# Patient Record
Sex: FEMALE | Race: WHITE | NOT HISPANIC OR LATINO | Employment: FULL TIME | URBAN - METROPOLITAN AREA
[De-identification: names, ages, dates, MRNs, and addresses within clinical notes are randomized per-mention and may not be internally consistent; named-entity substitution may affect disease eponyms.]

---

## 2017-02-15 ENCOUNTER — GENERIC CONVERSION - ENCOUNTER (OUTPATIENT)
Dept: OTHER | Facility: OTHER | Age: 72
End: 2017-02-15

## 2017-02-16 DIAGNOSIS — E03.9 HYPOTHYROIDISM: ICD-10-CM

## 2017-02-16 DIAGNOSIS — M10.9 GOUT: ICD-10-CM

## 2017-02-16 DIAGNOSIS — E78.5 HYPERLIPIDEMIA: ICD-10-CM

## 2017-02-16 DIAGNOSIS — I10 ESSENTIAL (PRIMARY) HYPERTENSION: ICD-10-CM

## 2017-02-16 DIAGNOSIS — E11.9 TYPE 2 DIABETES MELLITUS WITHOUT COMPLICATIONS (HCC): ICD-10-CM

## 2017-02-20 ENCOUNTER — GENERIC CONVERSION - ENCOUNTER (OUTPATIENT)
Dept: OTHER | Facility: OTHER | Age: 72
End: 2017-02-20

## 2017-03-24 ENCOUNTER — GENERIC CONVERSION - ENCOUNTER (OUTPATIENT)
Dept: OTHER | Facility: OTHER | Age: 72
End: 2017-03-24

## 2017-05-01 ENCOUNTER — ALLSCRIPTS OFFICE VISIT (OUTPATIENT)
Dept: OTHER | Facility: OTHER | Age: 72
End: 2017-05-01

## 2017-05-30 ENCOUNTER — ALLSCRIPTS OFFICE VISIT (OUTPATIENT)
Dept: OTHER | Facility: OTHER | Age: 72
End: 2017-05-30

## 2017-05-30 DIAGNOSIS — M70.51 OTHER BURSITIS OF KNEE, RIGHT KNEE: ICD-10-CM

## 2017-06-12 ENCOUNTER — ALLSCRIPTS OFFICE VISIT (OUTPATIENT)
Dept: OTHER | Facility: OTHER | Age: 72
End: 2017-06-12

## 2017-06-19 ENCOUNTER — ALLSCRIPTS OFFICE VISIT (OUTPATIENT)
Dept: OTHER | Facility: OTHER | Age: 72
End: 2017-06-19

## 2017-06-20 ENCOUNTER — GENERIC CONVERSION - ENCOUNTER (OUTPATIENT)
Dept: OTHER | Facility: OTHER | Age: 72
End: 2017-06-20

## 2017-06-22 ENCOUNTER — GENERIC CONVERSION - ENCOUNTER (OUTPATIENT)
Dept: OTHER | Facility: OTHER | Age: 72
End: 2017-06-22

## 2017-06-27 ENCOUNTER — GENERIC CONVERSION - ENCOUNTER (OUTPATIENT)
Dept: OTHER | Facility: OTHER | Age: 72
End: 2017-06-27

## 2017-07-03 ENCOUNTER — ALLSCRIPTS OFFICE VISIT (OUTPATIENT)
Dept: OTHER | Facility: OTHER | Age: 72
End: 2017-07-03

## 2017-07-10 ENCOUNTER — ALLSCRIPTS OFFICE VISIT (OUTPATIENT)
Dept: OTHER | Facility: OTHER | Age: 72
End: 2017-07-10

## 2017-07-24 ENCOUNTER — ALLSCRIPTS OFFICE VISIT (OUTPATIENT)
Dept: OTHER | Facility: OTHER | Age: 72
End: 2017-07-24

## 2017-08-07 ENCOUNTER — ALLSCRIPTS OFFICE VISIT (OUTPATIENT)
Dept: OTHER | Facility: OTHER | Age: 72
End: 2017-08-07

## 2017-08-21 ENCOUNTER — ALLSCRIPTS OFFICE VISIT (OUTPATIENT)
Dept: OTHER | Facility: OTHER | Age: 72
End: 2017-08-21

## 2017-09-05 ENCOUNTER — ALLSCRIPTS OFFICE VISIT (OUTPATIENT)
Dept: OTHER | Facility: OTHER | Age: 72
End: 2017-09-05

## 2017-09-20 ENCOUNTER — ALLSCRIPTS OFFICE VISIT (OUTPATIENT)
Dept: OTHER | Facility: OTHER | Age: 72
End: 2017-09-20

## 2017-10-01 DIAGNOSIS — E03.9 HYPOTHYROIDISM: ICD-10-CM

## 2017-10-01 DIAGNOSIS — E79.0 HYPERURICEMIA WITHOUT SIGNS OF INFLAMMATORY ARTHRITIS AND TOPHACEOUS DISEASE: ICD-10-CM

## 2017-10-01 DIAGNOSIS — E11.9 TYPE 2 DIABETES MELLITUS WITHOUT COMPLICATIONS (HCC): ICD-10-CM

## 2017-10-01 DIAGNOSIS — E78.5 HYPERLIPIDEMIA: ICD-10-CM

## 2017-10-02 ENCOUNTER — ALLSCRIPTS OFFICE VISIT (OUTPATIENT)
Dept: OTHER | Facility: OTHER | Age: 72
End: 2017-10-02

## 2017-10-22 ENCOUNTER — GENERIC CONVERSION - ENCOUNTER (OUTPATIENT)
Dept: OTHER | Facility: OTHER | Age: 72
End: 2017-10-22

## 2017-12-12 DIAGNOSIS — E03.9 HYPOTHYROIDISM: ICD-10-CM

## 2017-12-12 DIAGNOSIS — C50.919 MALIGNANT NEOPLASM OF FEMALE BREAST (HCC): ICD-10-CM

## 2017-12-12 DIAGNOSIS — E78.5 HYPERLIPIDEMIA: ICD-10-CM

## 2017-12-12 DIAGNOSIS — E11.9 TYPE 2 DIABETES MELLITUS WITHOUT COMPLICATIONS (HCC): ICD-10-CM

## 2017-12-12 DIAGNOSIS — E79.0 HYPERURICEMIA WITHOUT SIGNS OF INFLAMMATORY ARTHRITIS AND TOPHACEOUS DISEASE: ICD-10-CM

## 2017-12-15 ENCOUNTER — ALLSCRIPTS OFFICE VISIT (OUTPATIENT)
Dept: OTHER | Facility: OTHER | Age: 72
End: 2017-12-15

## 2017-12-16 NOTE — PROCEDURES
Chief Complaint  Prepatellar bursitis of right knee      Current Meds   1  Acetaminophen-Codeine #3 300-30 MG Oral Tablet; 1-2 q 4 h prn pain; Therapy: 84RGS6933 to (Last BA:37VWU8320) Ordered   2  Albuterol Sulfate 108 (90 Base) MCG/ACT AERS; USE AS DIRECTED Recorded   3  [place] Thyroid 90 MG Oral Tablet; TAKE ONE TABLET BY MOUTH ONCE DAILY; Therapy: 57Pez6762 to (Last Rx:97Akn2732)  Requested for: 37NUB9258 Ordered   4  DiazePAM 5 MG Oral Tablet; One 3 times a day when necessary anxiety; Therapy: 67NEA9847 to (Last VZ:86XUT1183) Ordered   5  EpiPen 2-Ever 0 3 MG/0 3ML Injection Solution Auto-injector; use as directed; Therapy: 26Zrf3688 to (Evaluate:37Uws2407)  Requested for: 82Aew7370; Last Rx:46Ikr7421 Ordered   6  Fluticasone Propionate 50 MCG/ACT Nasal Suspension; Therapy: 08LBJ8331 to (Evaluate:90Egu1132) Recorded   7  Losartan Potassium-HCTZ 50-12 5 MG Oral Tablet; TAKE 1 TABLET DAILY; Therapy: 19Uao3646 to (Evaluate:17Nzk9619)  Requested for: 64EOR7584; Last Rx:42Via2426 Ordered   8  Rosuvastatin Calcium 10 MG Oral Tablet (Crestor); take one tablet by mouth every day; Therapy: 30FOR2801 to (Evaluate:66Yhm5003)  Requested for: 79EQN0060; Last Rx:36Smp3336; Status: ACTIVE - Transmit to [facility] Ordered   9  Symbicort 160-4 5 MCG/ACT Inhalation Aerosol; Therapy: 00YPC3571 to (Evaluate:69Riz2309) Recorded    Allergies  1  amoxicillin   2  Cefzil   3  erythromycin   4  Keflex   5  LevoFLOXacin TABS   6  Sulfa Antibiotics    Vitals  Signs   Temperature: 96 3 F  Heart Rate: 84  Respiration: 16  Systolic: 232  Diastolic: 78  Height: 5 ft 4 in  Weight: 147 lb   BMI Calculated: 25 23  BSA Calculated: 1 72  O2 Saturation: 93    Procedure    Procedure: Aspiration of the right prepatellar bursa  Indication:  Effusion  Were discussed with the patient  Verbal consent was obtained prior to the procedure  Alcohol was used to prep the area   The area was then injected with 2 mL 2% lidocaine with epinephrine  Using sterile technique, the aspiration/injection needle was then directed from a inferior aspect  20 mL of yellow fluid was aspirated with an 18-gauge  A bandage was applied  the patient tolerated the procedure well  Complications: none  Patient instructed to avoid strenuous activity for 3 day(s)  Follow-up in the office as needed  Assessment  1  Prepatellar bursitis of right knee (458 58) (M70 41)    Plan  Arthritis    · Ibuprofen 400 MG Oral Tablet; TAKE 1 TABLET 3 TIMES DAILY AS NEEDED    Future Appointments    Date/Time Provider Specialty Site   12/19/2017 10:00 AM EMERSON Metcalf   [address] [place] [address]     Signatures   Electronically signed by : EMERSON Robison ; Dec 15 2017 12:30PM EST                       (Author)

## 2017-12-16 NOTE — PROGRESS NOTES
Assessment  1  Prepatellar bursitis of right knee (726 65) (M70 41)    Plan  Arthritis    · Ibuprofen 400 MG Oral Tablet; TAKE 1 TABLET 3 TIMES DAILY AS NEEDED    Discussion/Summary    Knee is drained of 20 cc of straw-colored fluid  Chief Complaint  Patient is in the office today complaining of having fallen on her right knee a week ago and it is swollen and she stated that she has fluid retention in her knee  History of Present Illness  HPI: Patient comes in with stool on right knee for falling on her knee 1 week ago  Review of Systems   Constitutional: No fever, no chills, feels well, no tiredness, no recent weight gain or loss  Cardiovascular: no complaints of slow or fast heart rate, no chest pain, no palpitations, no leg claudication or lower extremity edema  Respiratory: no complaints of shortness of breath, no wheezing, no dyspnea on exertion, no orthopnea or PND  Active Problems  1  Acute bronchitis due to Mycoplasma pneumoniae (466 0,041 81) (J20 0)   2  Acute bronchitis, unspecified organism (466 0) (J20 9)   3  Acute URI (465 9) (J06 9)   4  Allergic rhinitis (477 9) (J30 9)   5  Allergic rhinitis, seasonal (477 9) (J30 2)   6  Anemia (285 9) (D64 9)   7  Anxiety (300 00) (F41 9)   8  Arthritis (716 90) (M19 90)   9  Asthma (493 90) (J45 909)   10  Breast cancer (174 9) (C50 919)   11  Bursitis of right knee (726 60) (M70 51)   12  Cervicalgia (723 1) (M54 2)   13  Dermatitis (692 9) (L30 9)   14  Diabetes (250 00) (E11 9)   15  Encounter for screening colonoscopy (V76 51) (Z12 11)   16  Encounter for special screening examination for genitourinary disorder (V81 6) (Z13 89)   17  Fatty liver (571 8) (K76 0)   18  Gout (274 9) (M10 9)   19  H/O blood clots (V12 51) (Z86 718)   20  Hyperlipidemia (272 4) (E78 5)   21  Hypertension (401 9) (I10)   22  Hyperuricemia (790 6) (E79 0)   23  Hypothyroidism (244 9) (E03 9)   24  Leg edema, right (782 3) (R60 0)   25   Liver enlargement (789 1) (R16 0)   26  Nausea and/or vomiting (787 01) (R11 2)   27  Need for influenza vaccination (V04 81) (Z23)   28  Need for pneumococcal vaccination (V03 82) (Z23)   29  Need for Tdap vaccination (V06 1) (Z23)   30  Need for vaccination with 13-polyvalent pneumococcal conjugate vaccine (V03 82) (Z23)   31  Need for Zostavax administration (V04 89) (Z23)   32  Seborrhea (706 3) (L21 9)   33  Sinusitis, acute (461 9) (J01 90)   34  Thyroid disorder (246 9) (E07 9)   35  Type 2 diabetes mellitus (250 00) (E11 9)   36  UTI (lower urinary tract infection) (599 0) (N39 0)   37  Vision disturbance (368 9) (H53 9)   38  Visit for pre-operative examination (V72 84) (Q91 438)    Past Medical History  1  History of acute sinusitis (V12 69) (Z87 09)   2  History of EKG (V15 89) (Z92 89)  Active Problems And Past Medical History Reviewed: The active problems and past medical history were reviewed and updated today  Family History  Mother    1  Family history of Cancer (199 1) (C80 1)   2  Family history of Diabetes (250 00) (E11 9)   3  Denied: Family history of mental disorder   4  Denied: Family history of substance abuse   5  Family history of Heart disease (429 9) (I51 9)  Father    6  Family history of Cancer (199 1) (C80 1)   7  Denied: Family history of mental disorder   8  Denied: Family history of substance abuse  Family History    9  Denied: Family history of mental disorder   10  Denied: Family history of substance abuse    Social History   · Activities: Golf   · Consumes alcohol occasionally (V49 89) (Z78 9)   · Former smoker (O37 24) (G08 148)   · Full-time employment   · Never uses seat belt   · No drug use    Surgical History  1  History of Breast Surgery Mastectomy   2  History of Hand Surgery   3  History of Incisional Breast Biopsy   4  History of Laminectomy Lumbar    Current Meds   1  Acetaminophen-Codeine #3 300-30 MG Oral Tablet; 1-2 q 4 h prn pain;  Therapy: 85QHY6695 to (Last KX:49GIJ4019) Ordered   2  Albuterol Sulfate 108 (90 Base) MCG/ACT AERS; USE AS DIRECTED Recorded   3  [place] Thyroid 90 MG Oral Tablet; TAKE ONE TABLET BY MOUTH ONCE DAILY; Therapy: 53Ulo2706 to (Last Rx:23Uzf1669)  Requested for: 49HPM2000 Ordered   4  DiazePAM 5 MG Oral Tablet; One 3 times a day when necessary anxiety; Therapy: 27CGG4980 to (Last TV:69KBW9613) Ordered   5  EpiPen 2-Ever 0 3 MG/0 3ML Injection Solution Auto-injector; use as directed; Therapy: 01Nhp1899 to (Evaluate:13Xnt6330)  Requested for: 12Qwx3311; Last Rx:65Isy6373 Ordered   6  Fluticasone Propionate 50 MCG/ACT Nasal Suspension; Therapy: 17OII1088 to (Evaluate:09Nhh0771) Recorded   7  Losartan Potassium-HCTZ 50-12 5 MG Oral Tablet; TAKE 1 TABLET DAILY; Therapy: 42Wpg2350 to (Evaluate:07Cfl7232)  Requested for: 33SZG4698; Last Rx:15Aul0574 Ordered   8  Rosuvastatin Calcium 10 MG Oral Tablet; take one tablet by mouth every day; Therapy: 05CPT2121 to (Evaluate:65Uop1979)  Requested for: 80JBQ7695; Last Rx:18Mix6050; Status: ACTIVE - Transmit to [facility] Verification Ordered   9  Symbicort 160-4 5 MCG/ACT Inhalation Aerosol; Therapy: 94PXU2990 to (Evaluate:84Tfe1086) Recorded    The medication list was reviewed and updated today  Allergies  1  amoxicillin   2  Cefzil   3  erythromycin   4  Keflex   5  LevoFLOXacin TABS   6  Sulfa Antibiotics    Vitals   Recorded: 56PFH7116 10:37AM   Temperature 96 3 F   Heart Rate 84   Respiration 16   Systolic 047   Diastolic 78   Height 5 ft 4 in   Weight 147 lb    BMI Calculated 25 23   BSA Calculated 1 72   O2 Saturation 93     Physical Exam   Musculoskeletal  Inspection/palpation of joints, bones, and muscles: Abnormal  -- effusion of prepatellar area          Results/Data  Falls Risk Assessment (Dx Z13 89 Screen for Neurologic Disorder) 40CIF5431 10:43AM User, Ahs     Test Name Result Flag Reference   Falls Risk      1 fall without injury in the past year       Future Appointments    Date/Time Provider Specialty Site   12/19/2017 10:00 AM EMERSON Chapman   [address]     Signatures   Electronically signed by : EMERSON Holden ; Dec 15 2017 12:27PM EST                       (Author)

## 2017-12-19 ENCOUNTER — ALLSCRIPTS OFFICE VISIT (OUTPATIENT)
Dept: OTHER | Facility: OTHER | Age: 72
End: 2017-12-19

## 2018-01-09 NOTE — PROGRESS NOTES
Chief Complaint  Patient is here for her allergy shot      Active Problems    1  Acute bronchitis due to Mycoplasma pneumoniae (466 0,041 81) (J20 0)   2  Acute bronchitis, unspecified organism (466 0) (J20 9)   3  Acute URI (465 9) (J06 9)   4  Allergic rhinitis (477 9) (J30 9)   5  Allergic rhinitis, seasonal (477 9) (J30 2)   6  Anemia (285 9) (D64 9)   7  Anxiety (300 00) (F41 9)   8  Arthritis (716 90) (M19 90)   9  Asthma (493 90) (J45 909)   10  Breast cancer (174 9) (C50 919)   11  Bursitis of right knee (726 60) (M70 51)   12  Cervicalgia (723 1) (M54 2)   13  Dermatitis (692 9) (L30 9)   14  Diabetes (250 00) (E11 9)   15  Encounter for screening colonoscopy (V76 51) (Z12 11)   16  Encounter for special screening examination for genitourinary disorder (V81 6) (Z13 89)   17  Fatty liver (571 8) (K76 0)   18  Gout (274 9) (M10 9)   19  H/O blood clots (V12 51) (Z86 718)   20  Hyperlipidemia (272 4) (E78 5)   21  Hypertension (401 9) (I10)   22  Hyperuricemia (790 6) (E79 0)   23  Hypothyroidism (244 9) (E03 9)   24  Leg edema, right (782 3) (R60 0)   25  Liver enlargement (789 1) (R16 0)   26  Nausea and/or vomiting (787 01) (R11 2)   27  Need for influenza vaccination (V04 81) (Z23)   28  Need for pneumococcal vaccination (V03 82) (Z23)   29  Need for Tdap vaccination (V06 1) (Z23)   30  Need for vaccination with 13-polyvalent pneumococcal conjugate vaccine (V03 82) (Z23)   31  Need for Zostavax administration (V04 89) (Z23)   32  Seborrhea (706 3) (L21 9)   33  Sinusitis, acute (461 9) (J01 90)   34  Thyroid disorder (246 9) (E07 9)   35  Type 2 diabetes mellitus (250 00) (E11 9)   36  UTI (lower urinary tract infection) (599 0) (N39 0)   37  Vision disturbance (368 9) (H53 9)   38  Visit for pre-operative examination (V72 84) (Z01 818)    Current Meds   1  Acetaminophen-Codeine #3 300-30 MG Oral Tablet; 1-2 q 4 h prn pain; Therapy: 04ALJ4320 to (Last YK:59YZX7182) Ordered   2   Albuterol Sulfate 108 (90 Base) MCG/ACT AERS; USE AS DIRECTED Recorded   3  Allopurinol 100 MG Oral Tablet; TAKE TWO TABLETS BY MOUTH ONCE DAILY; Therapy: 39Esw0998 to (Last Rx:12Apr2017)  Requested for: 73Aeh7575 Ordered   4  [place] Thyroid 90 MG Oral Tablet; TAKE ONE TABLET BY MOUTH ONCE DAILY; Therapy: 20Jun2016 to (Last Rx:88Ayj7709)  Requested for: 00SRP6530; Status:   ACTIVE - Transmit to Pharmacy - Awaiting Verification Ordered   5  Colcrys 0 6 MG Oral Tablet; 1 daily and 1 q 3 h prn gout; Therapy: 89LCK1003 to (Last QP:73IIV4820)  Requested for: 98BWI2785; Status:   ACTIVE - Transmit to Pharmacy - Awaiting Verification Ordered   6  DiazePAM 5 MG Oral Tablet; One 3 times a day when necessary anxiety; Therapy: 18QJN7391 to (Last AT:04XPV7226) Ordered   7  EpiPen 2-Ever 0 3 MG/0 3ML Injection Solution Auto-injector; use as directed; Therapy: 16Mfp2013 to (Evaluate:09Qiu6412)  Requested for: 47Shu1495; Last   Rx:74Mys2906 Ordered   8  Fluticasone Propionate 50 MCG/ACT Nasal Suspension; Therapy: 44PZK5097 to (Evaluate:47Tlv1996) Recorded   9  Indomethacin 25 MG Oral Capsule; TAKE 1 CAPSULE 3 times daily WITH FOOD; Therapy: 17ABJ0344 to (Complete:19Jun2017)  Requested for: 69RSJ6545; Last   Rx:05Xft4733 Ordered   10  Losartan Potassium-HCTZ 50-12 5 MG Oral Tablet; TAKE 1 TABLET DAILY; Therapy: 72Aqk9306 to (Evaluate:21Jly6194)  Requested for: 56CRY7574; Last    Rx:19May2017 Ordered   11  Rosuvastatin Calcium 10 MG Oral Tablet; take one tablet by mouth every day; Therapy: 78LWF1678 to (Evaluate:34Rcg9792)  Requested for: 47CKF8252; Last    Rx:49Ljq1819; Status: ACTIVE - Transmit to [facility] Verification    Ordered   12  Symbicort 160-4 5 MCG/ACT Inhalation Aerosol; Therapy: 92CJW4606 to (Evaluate:88Oij4713) Recorded    Allergies    1  LevoFLOXacin TABS   2  amoxicillin   3  Cefzil   4  erythromycin   5  Keflex   6   Sulfa Antibiotics    Results/Data  Allergy Injection, multiple injections 82VYN0424 09: 28AM [facility]     Test Name Result Flag Reference   Allergy Injection #1 0 50 cc vial 1     Allergy Injection #1 Site      left upper arm superior   Allergy Inj #1 Reaction none     Allergy Injection #2 0 50cc vial 2     Allergy Injection #2 Site      left upper arm inferior   Allergy Inj #2 Reaction non     Allergy Injection #1 0 50 cc vial 1     Allergy Injection #1 Site      left upper arm superior   Allergy Inj #1 Reaction none     Allergy Injection #2 0 50cc vial 2     Allergy Injection #2 Site      left upper arm inferior   Allergy Inj #2 Reaction non               Plan  Allergic rhinitis    · Allergy Injection, multiple injections; Status:Resulted - Requires  Verification,Retrospective By Protocol Authorization;   Done: 56ZKQ7667 09:28AM    Future Appointments    Date/Time Provider Specialty Site   06/19/2017 09:45 AM [facility], Nurse Schedule  [address]   06/27/2017 09:15 AM [facility], Nurse Schedule  [address]   07/03/2017 09:15 AM [facility], Nurse Schedule  [address]   07/10/2017 08:45 AM [facility], Nurse Schedule  [address]   07/17/2017 09:15 AM [facility], Nurse Schedule  [address]   07/24/2017 09:15 AM [facility], Nurse Schedule  [address]   07/31/2017 09:15 AM [facility], Nurse Schedule  [address]     Signatures   Electronically signed by : Vahid Orellana, ; Jun 12 2017  9:29AM EST                       (Author)

## 2018-01-10 NOTE — PROGRESS NOTES
Chief Complaint  Patient here for allergy injections  Active Problems    1  Acute bronchitis due to Mycoplasma pneumoniae (466 0,041 81) (J20 0)   2  Acute bronchitis, unspecified organism (466 0) (J20 9)   3  Acute URI (465 9) (J06 9)   4  Allergic rhinitis (477 9) (J30 9)   5  Allergic rhinitis, seasonal (477 9) (J30 2)   6  Anemia (285 9) (D64 9)   7  Anxiety (300 00) (F41 9)   8  Arthritis (716 90) (M19 90)   9  Asthma (493 90) (J45 909)   10  Breast cancer (174 9) (C50 919)   11  Bursitis of right knee (726 60) (M70 51)   12  Cervicalgia (723 1) (M54 2)   13  Dermatitis (692 9) (L30 9)   14  Diabetes (250 00) (E11 9)   15  Encounter for screening colonoscopy (V76 51) (Z12 11)   16  Encounter for special screening examination for genitourinary disorder (V81 6) (Z13 89)   17  Fatty liver (571 8) (K76 0)   18  Gout (274 9) (M10 9)   19  H/O blood clots (V12 51) (Z86 718)   20  Hyperlipidemia (272 4) (E78 5)   21  Hypertension (401 9) (I10)   22  Hyperuricemia (790 6) (E79 0)   23  Hypothyroidism (244 9) (E03 9)   24  Leg edema, right (782 3) (R60 0)   25  Liver enlargement (789 1) (R16 0)   26  Nausea and/or vomiting (787 01) (R11 2)   27  Need for influenza vaccination (V04 81) (Z23)   28  Need for pneumococcal vaccination (V03 82) (Z23)   29  Need for Tdap vaccination (V06 1) (Z23)   30  Need for vaccination with 13-polyvalent pneumococcal conjugate vaccine (V03 82) (Z23)   31  Need for Zostavax administration (V04 89) (Z23)   32  Seborrhea (706 3) (L21 9)   33  Sinusitis, acute (461 9) (J01 90)   34  Thyroid disorder (246 9) (E07 9)   35  Type 2 diabetes mellitus (250 00) (E11 9)   36  UTI (lower urinary tract infection) (599 0) (N39 0)   37  Vision disturbance (368 9) (H53 9)   38  Visit for pre-operative examination (B22 84) (Z01 818)    Current Meds   1  Acetaminophen-Codeine #3 300-30 MG Oral Tablet; 1-2 q 4 h prn pain; Therapy: 91RJO0368 to (Last DP:02WHV9020) Ordered   2   Albuterol Sulfate 108 (90 Base) MCG/ACT AERS; USE AS DIRECTED Recorded   3  Allopurinol 100 MG Oral Tablet; TAKE TWO TABLETS BY MOUTH ONCE DAILY; Therapy: 80Cxa7651 to (Last Rx:17Hhv9248)  Requested for: 58Fbh3418 Ordered   4  [place] Thyroid 90 MG Oral Tablet; TAKE ONE TABLET BY MOUTH ONCE DAILY; Therapy: 81Xve2301 to (Last Rx:54Uki5956)  Requested for: 78AJC1766; Status:   ACTIVE - Transmit to Pharmacy - Awaiting Verification Ordered   5  Colcrys 0 6 MG Oral Tablet; 1 daily and 1 q 3 h prn gout; Therapy: 80GXK5903 to (Last JU:99DUS5502)  Requested for: 19CBO0419; Status:   ACTIVE - Transmit to Pharmacy - Awaiting Verification Ordered   6  DiazePAM 5 MG Oral Tablet; One 3 times a day when necessary anxiety; Therapy: 50TEX5497 to (Last SL:51ORM1153) Ordered   7  EpiPen 2-Ever 0 3 MG/0 3ML Injection Solution Auto-injector; use as directed; Therapy: 78Yni8365 to (Evaluate:06Hcc8290)  Requested for: 15Toc8824; Last   Rx:02Laa8476 Ordered   8  Fluticasone Propionate 50 MCG/ACT Nasal Suspension; Therapy: 09YGM2624 to (Evaluate:10Fnl4604) Recorded   9  Losartan Potassium-HCTZ 50-12 5 MG Oral Tablet; TAKE 1 TABLET DAILY; Therapy: 37Ijq8938 to (Evaluate:19Xal2487)  Requested for: 55TVK0762; Last   Rx:17Zdg1843 Ordered   10  Rosuvastatin Calcium 10 MG Oral Tablet; take one tablet by mouth every day; Therapy: 91ZRF2055 to (Evaluate:15Wqf3281)  Requested for: 41QNO5645; Last    Rx:12Rnw1328; Status: ACTIVE - Transmit to [facility] Verification    Ordered   11  Symbicort 160-4 5 MCG/ACT Inhalation Aerosol; Therapy: 71OGD1292 to (Evaluate:91Epi0394) Recorded    Allergies    1  LevoFLOXacin TABS   2  amoxicillin   3  Cefzil   4  erythromycin   5  Keflex   6   Sulfa Antibiotics    Results/Data  Allergy Injection, multiple injections 69MHT8645 08:55AM [facility]     Test Name Result Flag Reference   Allergy Injection #1      Vial 1 of 2 0 5 ml SQ   Allergy Injection #1 Site      upper left arm, inferior position   Allergy Injection #2      Vial 2 of 2 0 5 ml SQ   Allergy Injection #2 Site      upper left arm, superior position       Plan  Allergic rhinitis    · Allergy Injection, multiple injections; Status:Complete;   Done: 60DYN8191 08:55AM    Future Appointments    Date/Time Provider Specialty Site   10/16/2017 08:45 AM [facility], Nurse Schedule  [address]   10/30/2017 08:45 AM [facility], Nurse Schedule  [address]     Signatures   Electronically signed by : EMERSON You ; Oct  2 2017  9:13AM EST                       (Author)

## 2018-01-12 VITALS
OXYGEN SATURATION: 95 % | DIASTOLIC BLOOD PRESSURE: 76 MMHG | WEIGHT: 166.01 LBS | HEART RATE: 77 BPM | HEIGHT: 64 IN | SYSTOLIC BLOOD PRESSURE: 122 MMHG | BODY MASS INDEX: 28.34 KG/M2

## 2018-01-12 NOTE — PROGRESS NOTES
Chief Complaint  Allergy injection today      Active Problems    1  Acute bronchitis due to Mycoplasma pneumoniae (466 0,041 81) (J20 0)   2  Acute bronchitis, unspecified organism (466 0) (J20 9)   3  Acute URI (465 9) (J06 9)   4  Allergic rhinitis (477 9) (J30 9)   5  Allergic rhinitis, seasonal (477 9) (J30 2)   6  Anemia (285 9) (D64 9)   7  Anxiety (300 00) (F41 9)   8  Arthritis (716 90) (M19 90)   9  Asthma (493 90) (J45 909)   10  Breast cancer (174 9) (C50 919)   11  Bursitis of right knee (726 60) (M70 51)   12  Cervicalgia (723 1) (M54 2)   13  Dermatitis (692 9) (L30 9)   14  Diabetes (250 00) (E11 9)   15  Encounter for screening colonoscopy (V76 51) (Z12 11)   16  Encounter for special screening examination for genitourinary disorder (V81 6) (Z13 89)   17  Fatty liver (571 8) (K76 0)   18  Gout (274 9) (M10 9)   19  H/O blood clots (V12 51) (Z86 718)   20  Hyperlipidemia (272 4) (E78 5)   21  Hypertension (401 9) (I10)   22  Hyperuricemia (790 6) (E79 0)   23  Hypothyroidism (244 9) (E03 9)   24  Leg edema, right (782 3) (R60 0)   25  Liver enlargement (789 1) (R16 0)   26  Nausea and/or vomiting (787 01) (R11 2)   27  Need for influenza vaccination (V04 81) (Z23)   28  Need for pneumococcal vaccination (V03 82) (Z23)   29  Need for Tdap vaccination (V06 1) (Z23)   30  Need for vaccination with 13-polyvalent pneumococcal conjugate vaccine (V03 82) (Z23)   31  Need for Zostavax administration (V04 89) (Z23)   32  Seborrhea (706 3) (L21 9)   33  Sinusitis, acute (461 9) (J01 90)   34  Thyroid disorder (246 9) (E07 9)   35  Type 2 diabetes mellitus (250 00) (E11 9)   36  UTI (lower urinary tract infection) (599 0) (N39 0)   37  Vision disturbance (368 9) (H53 9)   38  Visit for pre-operative examination (V72 84) (Z01 818)    Current Meds   1  Acetaminophen-Codeine #3 300-30 MG Oral Tablet; 1-2 q 4 h prn pain; Therapy: 28NKN3113 to (Last GI:23FUG9355) Ordered   2   Albuterol Sulfate 108 (90 Base) MCG/ACT AERS; USE AS DIRECTED Recorded   3  Allopurinol 100 MG Oral Tablet; TAKE TWO TABLETS BY MOUTH ONCE DAILY; Therapy: 61Lsx8307 to (Last Rx:26Qyt2693)  Requested for: 46Dpw5939 Ordered   4  [place] Thyroid 90 MG Oral Tablet; TAKE ONE TABLET BY MOUTH ONCE DAILY; Therapy: 72Dly9767 to (Last Rx:45Cgq9706)  Requested for: 78TAP7131; Status:   ACTIVE - Transmit to Pharmacy - Awaiting Verification Ordered   5  Colcrys 0 6 MG Oral Tablet (Colchicine); 1 daily and 1 q 3 h prn gout; Therapy: 45VXR6015 to (Last NE:77GAR2278)  Requested for: 36SFM5340; Status:   ACTIVE - Transmit to Pharmacy - Awaiting Verification Ordered   6  DiazePAM 5 MG Oral Tablet; One 3 times a day when necessary anxiety; Therapy: 92WAF4843 to (Last KB:12PRN4046) Ordered   7  EpiPen 2-Ever 0 3 MG/0 3ML Injection Solution Auto-injector; use as directed; Therapy: 99Tzz8810 to (Evaluate:44Lis9145)  Requested for: 10Kjj4313; Last   Rx:77Squ0789 Ordered   8  Fluticasone Propionate 50 MCG/ACT Nasal Suspension; Therapy: 72ZYD8061 to (Evaluate:31Icj2571) Recorded   9  Losartan Potassium-HCTZ 50-12 5 MG Oral Tablet; TAKE 1 TABLET DAILY; Therapy: 67Fzg9150 to (Evaluate:92Ehu4011)  Requested for: 37EQI2087; Last   Rx:37Ivr1168 Ordered   10  Rosuvastatin Calcium 10 MG Oral Tablet (Crestor); take one tablet by mouth every day; Therapy: 97QKD0793 to (Evaluate:89Hhp0481)  Requested for: 16TPC4139; Last    Rx:33Fkx5508; Status: ACTIVE - Transmit to [facility] Verification Ordered   11  Symbicort 160-4 5 MCG/ACT Inhalation Aerosol; Therapy: 63VKN6104 to (Evaluate:74Woi3206) Recorded    Allergies    1  LevoFLOXacin TABS   2  amoxicillin   3  Cefzil   4  erythromycin   5  Keflex   6   Sulfa Antibiotics    Future Appointments    Date/Time Provider Specialty Site   09/18/2017 08:45 AM [facility], Nurse Schedule  [address]   10/02/2017 08:45 AM [facility], Nurse Schedule   [address] [facility]   10/16/2017 08:45 AM [facility], Nurse Schedule  [address]   10/30/2017 08:45 AM [facility], Nurse Schedule  [address]     Signatures   Electronically signed by : EMERSON Larsen ; Sep  5 2017 11:21AM EST

## 2018-01-13 VITALS
WEIGHT: 163 LBS | OXYGEN SATURATION: 98 % | DIASTOLIC BLOOD PRESSURE: 70 MMHG | BODY MASS INDEX: 27.83 KG/M2 | SYSTOLIC BLOOD PRESSURE: 112 MMHG | HEIGHT: 64 IN | HEART RATE: 88 BPM

## 2018-01-14 NOTE — PROGRESS NOTES
Chief Complaint  Patient here for allergy injection  Active Problems    1  Acute bronchitis due to Mycoplasma pneumoniae (466 0,041 81) (J20 0)   2  Acute bronchitis, unspecified organism (466 0) (J20 9)   3  Acute URI (465 9) (J06 9)   4  Allergic rhinitis (477 9) (J30 9)   5  Allergic rhinitis, seasonal (477 9) (J30 2)   6  Anemia (285 9) (D64 9)   7  Anxiety (300 00) (F41 9)   8  Arthritis (716 90) (M19 90)   9  Asthma (493 90) (J45 909)   10  Breast cancer (174 9) (C50 919)   11  Bursitis of right knee (726 60) (M70 51)   12  Cervicalgia (723 1) (M54 2)   13  Dermatitis (692 9) (L30 9)   14  Diabetes (250 00) (E11 9)   15  Encounter for screening colonoscopy (V76 51) (Z12 11)   16  Encounter for special screening examination for genitourinary disorder (V81 6) (Z13 89)   17  Fatty liver (571 8) (K76 0)   18  Gout (274 9) (M10 9)   19  H/O blood clots (V12 51) (Z86 718)   20  Hyperlipidemia (272 4) (E78 5)   21  Hypertension (401 9) (I10)   22  Hyperuricemia (790 6) (E79 0)   23  Hypothyroidism (244 9) (E03 9)   24  Leg edema, right (782 3) (R60 0)   25  Liver enlargement (789 1) (R16 0)   26  Nausea and/or vomiting (787 01) (R11 2)   27  Need for influenza vaccination (V04 81) (Z23)   28  Need for pneumococcal vaccination (V03 82) (Z23)   29  Need for Tdap vaccination (V06 1) (Z23)   30  Need for vaccination with 13-polyvalent pneumococcal conjugate vaccine (V03 82) (Z23)   31  Need for Zostavax administration (V04 89) (Z23)   32  Seborrhea (706 3) (L21 9)   33  Sinusitis, acute (461 9) (J01 90)   34  Thyroid disorder (246 9) (E07 9)   35  Type 2 diabetes mellitus (250 00) (E11 9)   36  UTI (lower urinary tract infection) (599 0) (N39 0)   37  Vision disturbance (368 9) (H53 9)   38  Visit for pre-operative examination (V72 84) (Z01 818)    Current Meds   1  Acetaminophen-Codeine #3 300-30 MG Oral Tablet; 1-2 q 4 h prn pain; Therapy: 26UNO3870 to (Last SP:19BNC3960) Ordered   2   Albuterol Sulfate 108 (90 Base) MCG/ACT AERS; USE AS DIRECTED Recorded   3  Allopurinol 100 MG Oral Tablet; TAKE TWO TABLETS BY MOUTH ONCE DAILY; Therapy: 24Gkd6520 to (Last Rx:29Itm0752)  Requested for: 19Ebq1305 Ordered   4  [place] Thyroid 90 MG Oral Tablet; TAKE ONE TABLET BY MOUTH ONCE DAILY; Therapy: 41Yaq4396 to (Last Rx:92Mig1796)  Requested for: 04KHO7349; Status:   ACTIVE - Transmit to Pharmacy - Awaiting Verification Ordered   5  Colcrys 0 6 MG Oral Tablet (Colchicine); 1 daily and 1 q 3 h prn gout; Therapy: 39ZRQ9674 to (Last PP:58UMU3911)  Requested for: 27YWK3034; Status:   ACTIVE - Transmit to Pharmacy - Awaiting Verification Ordered   6  DiazePAM 5 MG Oral Tablet; One 3 times a day when necessary anxiety; Therapy: 05XIY2392 to (Last ZQ:32LFC3634) Ordered   7  EpiPen 2-Ever 0 3 MG/0 3ML Injection Solution Auto-injector; use as directed; Therapy: 96Ypn3097 to (Evaluate:45Udw7775)  Requested for: 33Mnh1646; Last   Rx:44Stc9642 Ordered   8  Fluticasone Propionate 50 MCG/ACT Nasal Suspension; Therapy: 97CSG0914 to (Evaluate:43Ezw7964) Recorded   9  Losartan Potassium-HCTZ 50-12 5 MG Oral Tablet; TAKE 1 TABLET DAILY; Therapy: 37Eei1451 to (Evaluate:70Zyj0552)  Requested for: 01GNF7750; Last   Rx:54Uhg0384 Ordered   10  Rosuvastatin Calcium 10 MG Oral Tablet (Crestor); take one tablet by mouth every day; Therapy: 66DLF5317 to (Evaluate:50Crk7655)  Requested for: 28DRA4723; Last    Rx:48Zyp4489; Status: ACTIVE - Transmit to [facility] Verification Ordered   11  Symbicort 160-4 5 MCG/ACT Inhalation Aerosol; Therapy: 26BOT5418 to (Evaluate:60Moh8679) Recorded    Allergies    1  LevoFLOXacin TABS   2  amoxicillin   3  Cefzil   4  erythromycin   5  Keflex   6   Sulfa Antibiotics    Results/Data  Allergy Injection, multiple injections 37Flh6406 10:10AM [facility]     Test Name Result Flag Reference   Allergy Injection #1 Vial #1  0 5 ml  SQ     Allergy Injection #1 Site      upper left arm inferior position Allergy Inj #1 Reaction 3 mm induration     Allergy Injection #2 Vial #2  0 5 ml  SQ     Allergy Injection #2 Site      upper left arm superior position   Allergy Inj #2 Reaction none while in office         Plan  Allergic rhinitis    · Allergy Injection, multiple injections; Status:Complete - Retrospective Authorization;    Done: 99SDM3157 10:10AM    Future Appointments    Date/Time Provider Specialty Site   08/21/2017 09:15 AM [facility], Nurse Schedule  [address]   09/05/2017 09:15 AM [facility], Nurse Schedule  [address]   09/18/2017 08:45 AM [facility], Nurse Schedule  [address]   10/02/2017 08:45 AM [facility], Nurse Schedule  [address]   10/16/2017 08:45 AM [facility], Nurse Schedule  [address]   10/30/2017 08:45 AM [facility], Nurse Schedule  [address]     Signatures   Electronically signed by : EMERSON Alexander ; Aug  7 2017 11:28AM EST

## 2018-01-15 NOTE — PROGRESS NOTES
Chief Complaint  Pt  in office today for her allergy injection  Active Problems    1  Acute bronchitis due to Mycoplasma pneumoniae (466 0,041 81) (J20 0)   2  Acute bronchitis, unspecified organism (466 0) (J20 9)   3  Acute URI (465 9) (J06 9)   4  Allergic rhinitis (477 9) (J30 9)   5  Allergic rhinitis, seasonal (477 9) (J30 2)   6  Anemia (285 9) (D64 9)   7  Anxiety (300 00) (F41 9)   8  Arthritis (716 90) (M19 90)   9  Asthma (493 90) (J45 909)   10  Breast cancer (174 9) (C50 919)   11  Bursitis of right knee (726 60) (M70 51)   12  Cervicalgia (723 1) (M54 2)   13  Dermatitis (692 9) (L30 9)   14  Diabetes (250 00) (E11 9)   15  Encounter for screening colonoscopy (V76 51) (Z12 11)   16  Encounter for special screening examination for genitourinary disorder (V81 6) (Z13 89)   17  Fatty liver (571 8) (K76 0)   18  Gout (274 9) (M10 9)   19  H/O blood clots (V12 51) (Z86 718)   20  Hyperlipidemia (272 4) (E78 5)   21  Hypertension (401 9) (I10)   22  Hyperuricemia (790 6) (E79 0)   23  Hypothyroidism (244 9) (E03 9)   24  Leg edema, right (782 3) (R60 0)   25  Liver enlargement (789 1) (R16 0)   26  Nausea and/or vomiting (787 01) (R11 2)   27  Need for influenza vaccination (V04 81) (Z23)   28  Need for pneumococcal vaccination (V03 82) (Z23)   29  Need for Tdap vaccination (V06 1) (Z23)   30  Need for vaccination with 13-polyvalent pneumococcal conjugate vaccine (V03 82) (Z23)   31  Need for Zostavax administration (V04 89) (Z23)   32  Seborrhea (706 3) (L21 9)   33  Sinusitis, acute (461 9) (J01 90)   34  Thyroid disorder (246 9) (E07 9)   35  Type 2 diabetes mellitus (250 00) (E11 9)   36  UTI (lower urinary tract infection) (599 0) (N39 0)   37  Vision disturbance (368 9) (H53 9)   38  Visit for pre-operative examination (G45 40) (Z01 818)    Current Meds   1  Acetaminophen-Codeine #3 300-30 MG Oral Tablet; 1-2 q 4 h prn pain; Therapy: 05ABP2442 to (Last UF:02FTY8575) Ordered   2   Albuterol Sulfate 108 (90 Base) MCG/ACT AERS; USE AS DIRECTED Recorded   3  Allopurinol 100 MG Oral Tablet; TAKE TWO TABLETS BY MOUTH ONCE DAILY; Therapy: 45Zmb0136 to (Last Rx:23Xzx7165)  Requested for: 38Szy1779 Ordered   4  [place] Thyroid 90 MG Oral Tablet; TAKE ONE TABLET BY MOUTH ONCE DAILY; Therapy: 95Pjy2148 to (Last Rx:89Cfk8926)  Requested for: 03JNE2905; Status:   ACTIVE - Transmit to Pharmacy - Awaiting Verification Ordered   5  Colcrys 0 6 MG Oral Tablet (Colchicine); 1 daily and 1 q 3 h prn gout; Therapy: 91LCI5632 to (Last XE:98QLM4013)  Requested for: 06UEX4702; Status:   ACTIVE - Transmit to Pharmacy - Awaiting Verification Ordered   6  DiazePAM 5 MG Oral Tablet; One 3 times a day when necessary anxiety; Therapy: 29IHO1221 to (Last VX:10LDK5139) Ordered   7  EpiPen 2-Ever 0 3 MG/0 3ML Injection Solution Auto-injector; use as directed; Therapy: 62Chp8073 to (Evaluate:14Aps2489)  Requested for: 42Kjh4160; Last   Rx:35Fdy6304 Ordered   8  Fluticasone Propionate 50 MCG/ACT Nasal Suspension; Therapy: 58SDU5859 to (Evaluate:30Qqc4372) Recorded   9  Losartan Potassium-HCTZ 50-12 5 MG Oral Tablet; TAKE 1 TABLET DAILY; Therapy: 24Sky1393 to (Evaluate:87Tit7559)  Requested for: 60QFR0661; Last   Rx:73Lzh5037 Ordered   10  Rosuvastatin Calcium 10 MG Oral Tablet (Crestor); take one tablet by mouth every day; Therapy: 18OFG7806 to (Evaluate:24Ycq3434)  Requested for: 43GXL7229; Last    Rx:19Uhf9463; Status: ACTIVE - Transmit to [facility] Verification Ordered   11  Symbicort 160-4 5 MCG/ACT Inhalation Aerosol; Therapy: 49VWY5196 to (Evaluate:13Kvw5001) Recorded    Allergies    1  LevoFLOXacin TABS   2  amoxicillin   3  Cefzil   4  erythromycin   5  Keflex   6   Sulfa Antibiotics    Results/Data  Allergy Injection Flow Sheet 05Fna9708 10:13AM      Test Name Result Flag Reference   Allergy Inj #1 Reaction none while in office     Allergy Inj #2 Reaction none while in office         Future Appointments    Date/Time Provider Specialty Site   09/05/2017 09:15 AM [address], Nurse Schedule  [address]   09/18/2017 08:45 AM [address], Nurse Schedule  [address]   10/02/2017 08:45 AM [address], Nurse Schedule  [address]   10/16/2017 08:45 AM [address], Nurse Schedule  [address]   10/30/2017 08:45 AM [address], Nurse Schedule  [address]     Signatures   Electronically signed by : EMERSON Henry ; Aug 21 2017 11:16AM EST

## 2018-01-16 NOTE — PROGRESS NOTES
Chief Complaint  allergy injections today      Active Problems    1  Acute bronchitis due to Mycoplasma pneumoniae (466 0,041 81) (J20 0)   2  Acute bronchitis, unspecified organism (466 0) (J20 9)   3  Acute URI (465 9) (J06 9)   4  Allergic rhinitis (477 9) (J30 9)   5  Allergic rhinitis, seasonal (477 9) (J30 2)   6  Anemia (285 9) (D64 9)   7  Anxiety (300 00) (F41 9)   8  Arthritis (716 90) (M19 90)   9  Asthma (493 90) (J45 909)   10  Breast cancer (174 9) (C50 919)   11  Bursitis of right knee (726 60) (M70 51)   12  Cervicalgia (723 1) (M54 2)   13  Dermatitis (692 9) (L30 9)   14  Diabetes (250 00) (E11 9)   15  Encounter for screening colonoscopy (V76 51) (Z12 11)   16  Encounter for special screening examination for genitourinary disorder (V81 6) (Z13 89)   17  Fatty liver (571 8) (K76 0)   18  Gout (274 9) (M10 9)   19  H/O blood clots (V12 51) (Z86 718)   20  Hyperlipidemia (272 4) (E78 5)   21  Hypertension (401 9) (I10)   22  Hyperuricemia (790 6) (E79 0)   23  Hypothyroidism (244 9) (E03 9)   24  Leg edema, right (782 3) (R60 0)   25  Liver enlargement (789 1) (R16 0)   26  Nausea and/or vomiting (787 01) (R11 2)   27  Need for influenza vaccination (V04 81) (Z23)   28  Need for pneumococcal vaccination (V03 82) (Z23)   29  Need for Tdap vaccination (V06 1) (Z23)   30  Need for vaccination with 13-polyvalent pneumococcal conjugate vaccine (V03 82) (Z23)   31  Need for Zostavax administration (V04 89) (Z23)   32  Seborrhea (706 3) (L21 9)   33  Sinusitis, acute (461 9) (J01 90)   34  Thyroid disorder (246 9) (E07 9)   35  Type 2 diabetes mellitus (250 00) (E11 9)   36  UTI (lower urinary tract infection) (599 0) (N39 0)   37  Vision disturbance (368 9) (H53 9)   38  Visit for pre-operative examination (G62 84) (Z01 818)    Current Meds   1  Acetaminophen-Codeine #3 300-30 MG Oral Tablet; 1-2 q 4 h prn pain; Therapy: 22OLK4774 to (Last NS:86NWN5102) Ordered   2   Albuterol Sulfate 108 (90 Base) MCG/ACT AERS; USE AS DIRECTED Recorded   3  Allopurinol 100 MG Oral Tablet; TAKE TWO TABLETS BY MOUTH ONCE DAILY; Therapy: 68Qrs6453 to (Last Rx:39Omd0234)  Requested for: 62Juz6063 Ordered   4  [place] Thyroid 90 MG Oral Tablet; TAKE ONE TABLET BY MOUTH ONCE DAILY; Therapy: 52Ojf0902 to (Last Rx:16Ehi0204)  Requested for: 90YIN8297; Status:   ACTIVE - Transmit to Pharmacy - Awaiting Verification Ordered   5  Colcrys 0 6 MG Oral Tablet (Colchicine); 1 daily and 1 q 3 h prn gout; Therapy: 30PDO3655 to (Last PW:99PXL1359)  Requested for: 14QIZ9217; Status:   ACTIVE - Transmit to Pharmacy - Awaiting Verification Ordered   6  DiazePAM 5 MG Oral Tablet; One 3 times a day when necessary anxiety; Therapy: 71XEO1710 to (Last UZ:66ZRP9784) Ordered   7  EpiPen 2-Ever 0 3 MG/0 3ML Injection Solution Auto-injector; use as directed; Therapy: 06Nnm0856 to (Evaluate:59Jwq4832)  Requested for: 72Rxq8964; Last   Rx:26Ynl1799 Ordered   8  Fluticasone Propionate 50 MCG/ACT Nasal Suspension; Therapy: 78EJO9857 to (Evaluate:91Lgf1872) Recorded   9  Losartan Potassium-HCTZ 50-12 5 MG Oral Tablet; TAKE 1 TABLET DAILY; Therapy: 06Nqg0301 to (Evaluate:06Vvw5902)  Requested for: 63MTR7166; Last   Rx:39Mhx2873 Ordered   10  Rosuvastatin Calcium 10 MG Oral Tablet (Crestor); take one tablet by mouth every day; Therapy: 88TYL6843 to (Evaluate:20Ojc9905)  Requested for: 32ZEP6739; Last    Rx:82Sdv5893; Status: ACTIVE - Transmit to [facility] Verification Ordered   11  Symbicort 160-4 5 MCG/ACT Inhalation Aerosol; Therapy: 19FVO8845 to (Evaluate:89Aft0953) Recorded    Allergies    1  LevoFLOXacin TABS   2  amoxicillin   3  Cefzil   4  erythromycin   5  Keflex   6   Sulfa Antibiotics    Future Appointments    Date/Time Provider Specialty Site   10/02/2017 08:45 AM [facility], Nurse Schedule  [address]   10/16/2017 08:45 AM [facility], Nurse Schedule   [address] [facility]   10/30/2017 08:45 AM [facility], Nurse Schedule  [address]     Signatures   Electronically signed by : EMERSON Wolf ; Sep 20 2017 12:34PM EST

## 2018-01-16 NOTE — PROGRESS NOTES
Chief Complaint  Patient is here for her allergy injections      Active Problems    1  Acute bronchitis due to Mycoplasma pneumoniae (466 0,041 81) (J20 0)   2  Acute bronchitis, unspecified organism (466 0) (J20 9)   3  Acute URI (465 9) (J06 9)   4  Allergic rhinitis (477 9) (J30 9)   5  Allergic rhinitis, seasonal (477 9) (J30 2)   6  Anemia (285 9) (D64 9)   7  Anxiety (300 00) (F41 9)   8  Arthritis (716 90) (M19 90)   9  Asthma (493 90) (J45 909)   10  Breast cancer (174 9) (C50 919)   11  Bursitis of right knee (726 60) (M70 51)   12  Cervicalgia (723 1) (M54 2)   13  Dermatitis (692 9) (L30 9)   14  Diabetes (250 00) (E11 9)   15  Encounter for screening colonoscopy (V76 51) (Z12 11)   16  Encounter for special screening examination for genitourinary disorder (V81 6) (Z13 89)   17  Fatty liver (571 8) (K76 0)   18  Gout (274 9) (M10 9)   19  H/O blood clots (V12 51) (Z86 718)   20  Hyperlipidemia (272 4) (E78 5)   21  Hypertension (401 9) (I10)   22  Hyperuricemia (790 6) (E79 0)   23  Hypothyroidism (244 9) (E03 9)   24  Leg edema, right (782 3) (R60 0)   25  Liver enlargement (789 1) (R16 0)   26  Nausea and/or vomiting (787 01) (R11 2)   27  Need for influenza vaccination (V04 81) (Z23)   28  Need for pneumococcal vaccination (V03 82) (Z23)   29  Need for Tdap vaccination (V06 1) (Z23)   30  Need for vaccination with 13-polyvalent pneumococcal conjugate vaccine (V03 82) (Z23)   31  Need for Zostavax administration (V04 89) (Z23)   32  Seborrhea (706 3) (L21 9)   33  Sinusitis, acute (461 9) (J01 90)   34  Thyroid disorder (246 9) (E07 9)   35  Type 2 diabetes mellitus (250 00) (E11 9)   36  UTI (lower urinary tract infection) (599 0) (N39 0)   37  Vision disturbance (368 9) (H53 9)   38  Visit for pre-operative examination (B65 84) (Z01 818)    Current Meds   1  Acetaminophen-Codeine #3 300-30 MG Oral Tablet; 1-2 q 4 h prn pain; Therapy: 67FVF1436 to (Last RE:49AUQ5571) Ordered   2   Albuterol Sulfate 108 (90 Base) MCG/ACT AERS; USE AS DIRECTED Recorded   3  Allopurinol 100 MG Oral Tablet; TAKE TWO TABLETS BY MOUTH ONCE DAILY; Therapy: 96Rsv9814 to (Last Rx:34Lbz7207)  Requested for: 12Vjf5181 Ordered   4  [place] Thyroid 90 MG Oral Tablet; TAKE ONE TABLET BY MOUTH ONCE DAILY; Therapy: 28Loz0178 to (Last Rx:63Eub6767)  Requested for: 50AQN1971; Status:   ACTIVE - Transmit to Pharmacy - Awaiting Verification Ordered   5  Colcrys 0 6 MG Oral Tablet (Colchicine); 1 daily and 1 q 3 h prn gout; Therapy: 51ADF3740 to (Last ZT:76GLZ6631)  Requested for: 68GSZ8330; Status:   ACTIVE - Transmit to Pharmacy - Awaiting Verification Ordered   6  DiazePAM 5 MG Oral Tablet; One 3 times a day when necessary anxiety; Therapy: 48WEC4244 to (Last AH:23SHR5982) Ordered   7  EpiPen 2-Ever 0 3 MG/0 3ML Injection Solution Auto-injector; use as directed; Therapy: 92Iqg5332 to (Evaluate:43Xsz3848)  Requested for: 76Xvi1623; Last   Rx:53Dzc5380 Ordered   8  Fluticasone Propionate 50 MCG/ACT Nasal Suspension; Therapy: 57KZC7870 to (Evaluate:53Buj8019) Recorded   9  Losartan Potassium-HCTZ 50-12 5 MG Oral Tablet; TAKE 1 TABLET DAILY; Therapy: 78Bqc5003 to (Evaluate:88Anw5487)  Requested for: 48HPE6309; Last   Rx:55Zlv1318 Ordered   10  Rosuvastatin Calcium 10 MG Oral Tablet (Crestor); take one tablet by mouth every day; Therapy: 90LOO2520 to (Evaluate:83Ifb9209)  Requested for: 90MZD5496; Last    Rx:70Gzq7680; Status: ACTIVE - Transmit to [facility] Verification Ordered   11  Symbicort 160-4 5 MCG/ACT Inhalation Aerosol; Therapy: 23HJZ5430 to (Evaluate:10Uws5630) Recorded    Allergies    1  LevoFLOXacin TABS   2  amoxicillin   3  Cefzil   4  erythromycin   5  Keflex   6   Sulfa Antibiotics    Results/Data  Allergy Injection, multiple injections 08Eyv7125 10:12AM Miki Gallardo     Test Name Result Flag Reference   Allergy Injection #1 0 50cc vial 1     Allergy Injection #1 Site      left upper arm superior   Allergy Inj #1 Reaction no reaction     Allergy Injection #2 0 5cc vial 2     Allergy Injection #2 Site      left upper arm inferior   Allergy Inj #2 Reaction no reaction         Plan  Allergic rhinitis    · Allergy Injection, multiple injections; Status:Complete;   Done: 13ZPU5964 10:12AM    Future Appointments    Date/Time Provider Specialty Site   08/08/2017 09:15 AM [facility], Nurse Schedule  [address]   08/21/2017 09:15 AM [facility], Nurse Schedule  [address]     Signatures   Electronically signed by : EMERSON Darden ; Jul 24 2017  2:32PM EST

## 2018-01-16 NOTE — PROGRESS NOTES
Chief Complaint  Pt  in office today for a her allergy injection      Active Problems    1  Acute bronchitis due to Mycoplasma pneumoniae (466 0,041 81) (J20 0)   2  Acute bronchitis, unspecified organism (466 0) (J20 9)   3  Acute URI (465 9) (J06 9)   4  Allergic rhinitis (477 9) (J30 9)   5  Allergic rhinitis, seasonal (477 9) (J30 2)   6  Anemia (285 9) (D64 9)   7  Anxiety (300 00) (F41 9)   8  Arthritis (716 90) (M19 90)   9  Asthma (493 90) (J45 909)   10  Breast cancer (174 9) (C50 919)   11  Bursitis of right knee (726 60) (M70 51)   12  Cervicalgia (723 1) (M54 2)   13  Dermatitis (692 9) (L30 9)   14  Diabetes (250 00) (E11 9)   15  Encounter for screening colonoscopy (V76 51) (Z12 11)   16  Encounter for special screening examination for genitourinary disorder (V81 6) (Z13 89)   17  Fatty liver (571 8) (K76 0)   18  Gout (274 9) (M10 9)   19  H/O blood clots (V12 51) (Z86 718)   20  Hyperlipidemia (272 4) (E78 5)   21  Hypertension (401 9) (I10)   22  Hyperuricemia (790 6) (E79 0)   23  Hypothyroidism (244 9) (E03 9)   24  Leg edema, right (782 3) (R60 0)   25  Liver enlargement (789 1) (R16 0)   26  Nausea and/or vomiting (787 01) (R11 2)   27  Need for influenza vaccination (V04 81) (Z23)   28  Need for pneumococcal vaccination (V03 82) (Z23)   29  Need for Tdap vaccination (V06 1) (Z23)   30  Need for vaccination with 13-polyvalent pneumococcal conjugate vaccine (V03 82) (Z23)   31  Need for Zostavax administration (V04 89) (Z23)   32  Seborrhea (706 3) (L21 9)   33  Sinusitis, acute (461 9) (J01 90)   34  Thyroid disorder (246 9) (E07 9)   35  Type 2 diabetes mellitus (250 00) (E11 9)   36  UTI (lower urinary tract infection) (599 0) (N39 0)   37  Vision disturbance (368 9) (H53 9)   38  Visit for pre-operative examination (G62 84) (Z01 818)    Current Meds   1  Acetaminophen-Codeine #3 300-30 MG Oral Tablet; 1-2 q 4 h prn pain; Therapy: 87JIG9590 to (Last SF:21JSW5320) Ordered   2   Albuterol Sulfate 108 (90 Base) MCG/ACT AERS; USE AS DIRECTED Recorded   3  Allopurinol 100 MG Oral Tablet; TAKE TWO TABLETS BY MOUTH ONCE DAILY; Therapy: 33Wul9371 to (Last Rx:93Hct4572)  Requested for: 05Yzl5222 Ordered   4  [place] Thyroid 90 MG Oral Tablet; TAKE ONE TABLET BY MOUTH ONCE DAILY; Therapy: 08Knh5337 to (Last Rx:58Fao7023)  Requested for: 32TPM4363; Status:   ACTIVE - Transmit to Pharmacy - Awaiting Verification Ordered   5  Colcrys 0 6 MG Oral Tablet (Colchicine); 1 daily and 1 q 3 h prn gout; Therapy: 56QSM9037 to (Last RQ:32JLC2610)  Requested for: 85DAV2750; Status:   ACTIVE - Transmit to Pharmacy - Awaiting Verification Ordered   6  DiazePAM 5 MG Oral Tablet; One 3 times a day when necessary anxiety; Therapy: 09ARA8573 to (Last DZ:39HGY2805) Ordered   7  EpiPen 2-Ever 0 3 MG/0 3ML Injection Solution Auto-injector; use as directed; Therapy: 40Moc1399 to (Evaluate:56Kfg5203)  Requested for: 36Oyq7910; Last   Rx:58Kxd2955 Ordered   8  Fluticasone Propionate 50 MCG/ACT Nasal Suspension; Therapy: 55KDW6894 to (Evaluate:81Ucc8879) Recorded   9  Losartan Potassium-HCTZ 50-12 5 MG Oral Tablet; TAKE 1 TABLET DAILY; Therapy: 65Svb6482 to (Evaluate:03Sak2905)  Requested for: 07LGW6093; Last   Rx:61Nrs9090 Ordered   10  Rosuvastatin Calcium 10 MG Oral Tablet (Crestor); take one tablet by mouth every day; Therapy: 49VHM2750 to (Evaluate:77Fqm3555)  Requested for: 88ROV5232; Last    Rx:71Mrh9303; Status: ACTIVE - Transmit to [facility] Verification Ordered   11  Symbicort 160-4 5 MCG/ACT Inhalation Aerosol; Therapy: 34WVH8857 to (Evaluate:86Fvw1427) Recorded    Allergies    1  LevoFLOXacin TABS   2  amoxicillin   3  Cefzil   4  erythromycin   5  Keflex   6   Sulfa Antibiotics    Results/Data  Allergy Injection, multiple injections 08VSR4345 10:51AM Jan Snooks     Test Name Result Flag Reference   Allergy Injection #1 0 50cc vial 1     Allergy Injection #1 Site      left upper arm inferior Allergy Inj #1 Reaction no reaction     Allergy Injection #2 0 50cc vial 2     Allergy Injection #2 Site      left upper arm superior   Allergy Inj #2 Reaction no reaction         Plan  Allergic rhinitis    · Allergy Injection, multiple injections; Status:Complete;   Done: 96VCS1550 10:51AM    Future Appointments    Date/Time Provider Specialty Site   07/03/2017 09:15 AM [facility], Nurse Schedule  [address]   07/10/2017 08:45 AM [facility], Nurse Schedule  [address]   07/17/2017 09:15 AM [facility], Nurse Schedule  [address]   07/24/2017 09:15 AM [facility], Nurse Schedule  [address]   07/31/2017 09:15 AM [facility], Nurse Schedule  [address]   08/08/2017 09:15 AM [facility], Nurse Schedule  [address] [place] [address]   08/14/2017 09:15 AM [facility], Nurse Schedule  [address]   08/21/2017 09:15 AM [facility], Nurse Schedule  [address] [place] [address]   08/28/2017 09:15 AM [facility], Nurse Schedule  [address]     Signatures   Electronically signed by : EMERSON Sandoval ; Jun 19 2017  1:35PM EST

## 2018-01-17 NOTE — PROGRESS NOTES
Chief Complaint  Pt  in office today for an allergy injection  Active Problems    1  Acute bronchitis due to Mycoplasma pneumoniae (466 0,041 81) (J20 0)   2  Acute bronchitis, unspecified organism (466 0) (J20 9)   3  Acute URI (465 9) (J06 9)   4  Allergic rhinitis (477 9) (J30 9)   5  Allergic rhinitis, seasonal (477 9) (J30 2)   6  Anemia (285 9) (D64 9)   7  Anxiety (300 00) (F41 9)   8  Arthritis (716 90) (M19 90)   9  Asthma (493 90) (J45 909)   10  Breast cancer (174 9) (C50 919)   11  Bursitis of right knee (726 60) (M70 51)   12  Cervicalgia (723 1) (M54 2)   13  Dermatitis (692 9) (L30 9)   14  Diabetes (250 00) (E11 9)   15  Encounter for screening colonoscopy (V76 51) (Z12 11)   16  Encounter for special screening examination for genitourinary disorder (V81 6) (Z13 89)   17  Fatty liver (571 8) (K76 0)   18  Gout (274 9) (M10 9)   19  H/O blood clots (V12 51) (Z86 718)   20  Hyperlipidemia (272 4) (E78 5)   21  Hypertension (401 9) (I10)   22  Hyperuricemia (790 6) (E79 0)   23  Hypothyroidism (244 9) (E03 9)   24  Leg edema, right (782 3) (R60 0)   25  Liver enlargement (789 1) (R16 0)   26  Nausea and/or vomiting (787 01) (R11 2)   27  Need for influenza vaccination (V04 81) (Z23)   28  Need for pneumococcal vaccination (V03 82) (Z23)   29  Need for Tdap vaccination (V06 1) (Z23)   30  Need for vaccination with 13-polyvalent pneumococcal conjugate vaccine (V03 82) (Z23)   31  Need for Zostavax administration (V04 89) (Z23)   32  Seborrhea (706 3) (L21 9)   33  Sinusitis, acute (461 9) (J01 90)   34  Thyroid disorder (246 9) (E07 9)   35  Type 2 diabetes mellitus (250 00) (E11 9)   36  UTI (lower urinary tract infection) (599 0) (N39 0)   37  Vision disturbance (368 9) (H53 9)   38  Visit for pre-operative examination (D40 34) (Z01 818)    Current Meds   1  Acetaminophen-Codeine #3 300-30 MG Oral Tablet; 1-2 q 4 h prn pain; Therapy: 88KYY4180 to (Last ML:55PDP4668) Ordered   2   Albuterol Sulfate 108 (90 Base) MCG/ACT AERS; USE AS DIRECTED Recorded   3  Allopurinol 100 MG Oral Tablet; TAKE TWO TABLETS BY MOUTH ONCE DAILY; Therapy: 56Dgv1135 to (Last Rx:16Mqr0779)  Requested for: 11Kgz6487 Ordered   4  [place] Thyroid 90 MG Oral Tablet; TAKE ONE TABLET BY MOUTH ONCE DAILY; Therapy: 14Zdp4224 to (Last Rx:96Vzb5646)  Requested for: 90JJH5015; Status:   ACTIVE - Transmit to Pharmacy - Awaiting Verification Ordered   5  Colcrys 0 6 MG Oral Tablet (Colchicine); 1 daily and 1 q 3 h prn gout; Therapy: 85ZJU5940 to (Last EM:09QOK8546)  Requested for: 77ZBA5330; Status:   ACTIVE - Transmit to Pharmacy - Awaiting Verification Ordered   6  DiazePAM 5 MG Oral Tablet; One 3 times a day when necessary anxiety; Therapy: 13SIU0084 to (Last ZN:37BEQ8409) Ordered   7  EpiPen 2-Ever 0 3 MG/0 3ML Injection Solution Auto-injector; use as directed; Therapy: 10Tpy9636 to (Evaluate:60Gqa3418)  Requested for: 05Rnp8214; Last   Rx:30Kro0523 Ordered   8  Fluticasone Propionate 50 MCG/ACT Nasal Suspension; Therapy: 00ASS5790 to (Evaluate:15Lqq2162) Recorded   9  Losartan Potassium-HCTZ 50-12 5 MG Oral Tablet; TAKE 1 TABLET DAILY; Therapy: 56Gsv9214 to (Evaluate:79Ztd9920)  Requested for: 90WSD2531; Last   Rx:71Gzd5899 Ordered   10  Rosuvastatin Calcium 10 MG Oral Tablet (Crestor); take one tablet by mouth every day; Therapy: 24ATD2748 to (Evaluate:37Nff3818)  Requested for: 32LGI8307; Last    Rx:82Adx6118; Status: ACTIVE - Transmit to [facility] Verification Ordered   11  Symbicort 160-4 5 MCG/ACT Inhalation Aerosol; Therapy: 86ITC5412 to (Evaluate:51Hea8171) Recorded    Allergies    1  LevoFLOXacin TABS   2  amoxicillin   3  Cefzil   4  erythromycin   5  Keflex   6   Sulfa Antibiotics    Results/Data  Allergy Injection, multiple injections 23DFG9986 10:00AM Azeb Coley     Test Name Result Flag Reference   Allergy Injection #1      Vial 1 of 2 0 5 ml SQ   Allergy Injection #1 Site      upper left arm superior position   Allergy Inj #1 Reaction      no reaction while in office   Allergy Injection #2      Vial 2 of 2 0 5 ml SQ   Allergy Injection #2 Site      upper left arm inferior position   Allergy Inj #2 Reaction      no reaction while in office       Plan  Allergic rhinitis    · Allergy Injection, multiple injections; Status:Complete;   Done: 23QTN7581 10:00AM    Future Appointments    Date/Time Provider Specialty Site   07/10/2017 08:45 AM [facility], Nurse Schedule  [address]   07/17/2017 09:15 AM [facility], Nurse Schedule  [address]   07/24/2017 09:15 AM [facility], Nurse Schedule  [address]   07/31/2017 09:15 AM [facility], Nurse Schedule  [address]   08/08/2017 09:15 AM [facility], Nurse Schedule  [address]   08/14/2017 09:15 AM [facility], Nurse Schedule  [address]   08/21/2017 09:15 AM [facility], Nurse Schedule  [address]   08/28/2017 09:15 AM [facility], Nurse Schedule  [address]     Signatures   Electronically signed by : EMERSON Henry ; Jul  3 2017  7:01PM EST

## 2018-01-17 NOTE — PROGRESS NOTES
Chief Complaint  Pt  in office today for her allergy injection  Active Problems    1  Acute bronchitis due to Mycoplasma pneumoniae (466 0,041 81) (J20 0)   2  Acute bronchitis, unspecified organism (466 0) (J20 9)   3  Acute URI (465 9) (J06 9)   4  Allergic rhinitis (477 9) (J30 9)   5  Allergic rhinitis, seasonal (477 9) (J30 2)   6  Anemia (285 9) (D64 9)   7  Anxiety (300 00) (F41 9)   8  Arthritis (716 90) (M19 90)   9  Asthma (493 90) (J45 909)   10  Breast cancer (174 9) (C50 919)   11  Bursitis of right knee (726 60) (M70 51)   12  Cervicalgia (723 1) (M54 2)   13  Dermatitis (692 9) (L30 9)   14  Diabetes (250 00) (E11 9)   15  Encounter for screening colonoscopy (V76 51) (Z12 11)   16  Encounter for special screening examination for genitourinary disorder (V81 6) (Z13 89)   17  Fatty liver (571 8) (K76 0)   18  Gout (274 9) (M10 9)   19  H/O blood clots (V12 51) (Z86 718)   20  Hyperlipidemia (272 4) (E78 5)   21  Hypertension (401 9) (I10)   22  Hyperuricemia (790 6) (E79 0)   23  Hypothyroidism (244 9) (E03 9)   24  Leg edema, right (782 3) (R60 0)   25  Liver enlargement (789 1) (R16 0)   26  Nausea and/or vomiting (787 01) (R11 2)   27  Need for influenza vaccination (V04 81) (Z23)   28  Need for pneumococcal vaccination (V03 82) (Z23)   29  Need for Tdap vaccination (V06 1) (Z23)   30  Need for vaccination with 13-polyvalent pneumococcal conjugate vaccine (V03 82) (Z23)   31  Need for Zostavax administration (V04 89) (Z23)   32  Seborrhea (706 3) (L21 9)   33  Sinusitis, acute (461 9) (J01 90)   34  Thyroid disorder (246 9) (E07 9)   35  Type 2 diabetes mellitus (250 00) (E11 9)   36  UTI (lower urinary tract infection) (599 0) (N39 0)   37  Vision disturbance (368 9) (H53 9)   38  Visit for pre-operative examination (V77 84) (Z01 818)    Current Meds   1  Acetaminophen-Codeine #3 300-30 MG Oral Tablet; 1-2 q 4 h prn pain; Therapy: 82KTI2716 to (Last LYN:18UTM7394) Ordered   2   Albuterol Sulfate 108 (90 Base) MCG/ACT AERS; USE AS DIRECTED Recorded   3  Allopurinol 100 MG Oral Tablet; TAKE TWO TABLETS BY MOUTH ONCE DAILY; Therapy: 23Gxx9789 to (Last Rx:65Dpq1455)  Requested for: 18Vrd7265 Ordered   4  [place] Thyroid 90 MG Oral Tablet; TAKE ONE TABLET BY MOUTH ONCE DAILY; Therapy: 74Pvf7002 to (Last Rx:66Zth0387)  Requested for: 40RZU9722; Status:   ACTIVE - Transmit to Pharmacy - Awaiting Verification Ordered   5  Colcrys 0 6 MG Oral Tablet; 1 daily and 1 q 3 h prn gout; Therapy: 30BBL4331 to (Last XR:32FUW3701)  Requested for: 27IQK1307; Status:   ACTIVE - Transmit to Pharmacy - Awaiting Verification Ordered   6  DiazePAM 5 MG Oral Tablet; One 3 times a day when necessary anxiety; Therapy: 03CTZ8024 to (Last IU:54USK5828) Ordered   7  EpiPen 2-Ever 0 3 MG/0 3ML Injection Solution Auto-injector; use as directed; Therapy: 70Iuw5079 to (Evaluate:19Ouq5706)  Requested for: 02Ayx2417; Last   Rx:00Sqi5851 Ordered   8  Fluticasone Propionate 50 MCG/ACT Nasal Suspension; Therapy: 23DUH9394 to (Evaluate:14Ozc0130) Recorded   9  Losartan Potassium-HCTZ 50-12 5 MG Oral Tablet; TAKE 1 TABLET DAILY; Therapy: 03Rrd1256 to (Evaluate:10Vjv1041)  Requested for: 71UCE9191; Last   Rx:60Vng0877 Ordered   10  Rosuvastatin Calcium 10 MG Oral Tablet; take one tablet by mouth every day; Therapy: 80XFD3856 to (Evaluate:79Xzc9467)  Requested for: 43YZO2231; Last    Rx:48Vxz7361; Status: ACTIVE - Transmit to [facility] Verification    Ordered   11  Symbicort 160-4 5 MCG/ACT Inhalation Aerosol; Therapy: 97QIR4782 to (Evaluate:80Qys3750) Recorded    Allergies    1  LevoFLOXacin TABS   2  amoxicillin   3  Cefzil   4  erythromycin   5  Keflex   6   Sulfa Antibiotics    Future Appointments    Date/Time Provider Specialty Site   07/17/2017 09:15 AM [facility], Nurse Schedule  [address]   07/24/2017 09:15 AM [facility], Nurse Schedule   [address] PR[facility]   07/31/2017 09:15 AM [facility], Nurse Schedule  [address]   08/08/2017 09:15 AM [facility], Nurse Schedule  [address]   08/14/2017 09:15 AM [facility], Nurse Schedule  [address]   08/21/2017 09:15 AM [facility], Nurse Schedule  [address]   08/28/2017 09:15 AM [facility], Nurse Schedule  [facility]     Signatures   Electronically signed by : EMERSON Jorgensen ; Jul 10 2017  6:33PM EST                       (Author)

## 2018-01-22 VITALS
DIASTOLIC BLOOD PRESSURE: 82 MMHG | WEIGHT: 144.25 LBS | OXYGEN SATURATION: 96 % | BODY MASS INDEX: 24.63 KG/M2 | HEART RATE: 82 BPM | SYSTOLIC BLOOD PRESSURE: 118 MMHG | HEIGHT: 64 IN

## 2018-01-23 VITALS
DIASTOLIC BLOOD PRESSURE: 78 MMHG | TEMPERATURE: 96.3 F | SYSTOLIC BLOOD PRESSURE: 120 MMHG | RESPIRATION RATE: 16 BRPM | WEIGHT: 147 LBS | HEIGHT: 64 IN | OXYGEN SATURATION: 93 % | HEART RATE: 84 BPM | BODY MASS INDEX: 25.1 KG/M2

## 2018-03-09 DIAGNOSIS — E78.5 HYPERLIPIDEMIA, UNSPECIFIED HYPERLIPIDEMIA TYPE: Primary | ICD-10-CM

## 2018-03-09 RX ORDER — ROSUVASTATIN CALCIUM 10 MG/1
1 TABLET, COATED ORAL DAILY
COMMUNITY
Start: 2016-11-21 | End: 2018-03-09 | Stop reason: SDUPTHER

## 2018-03-09 RX ORDER — ROSUVASTATIN CALCIUM 10 MG/1
10 TABLET, COATED ORAL DAILY
Qty: 90 TABLET | Refills: 5 | Status: SHIPPED | OUTPATIENT
Start: 2018-03-09 | End: 2018-06-12 | Stop reason: SDUPTHER

## 2018-03-16 DIAGNOSIS — E03.9 HYPOTHYROIDISM, UNSPECIFIED TYPE: Primary | ICD-10-CM

## 2018-03-16 RX ORDER — LEVOTHYROXINE AND LIOTHYRONINE 57; 13.5 UG/1; UG/1
1 TABLET ORAL DAILY
COMMUNITY
Start: 2016-06-20 | End: 2018-03-16 | Stop reason: SDUPTHER

## 2018-03-18 RX ORDER — LEVOTHYROXINE AND LIOTHYRONINE 57; 13.5 UG/1; UG/1
90 TABLET ORAL DAILY
Qty: 30 TABLET | Refills: 3 | Status: SHIPPED | OUTPATIENT
Start: 2018-03-18 | End: 2018-07-08 | Stop reason: SDUPTHER

## 2018-04-04 ENCOUNTER — OFFICE VISIT (OUTPATIENT)
Dept: FAMILY MEDICINE CLINIC | Facility: CLINIC | Age: 73
End: 2018-04-04
Payer: MEDICARE

## 2018-04-04 VITALS
DIASTOLIC BLOOD PRESSURE: 72 MMHG | HEIGHT: 64 IN | BODY MASS INDEX: 25.22 KG/M2 | OXYGEN SATURATION: 91 % | SYSTOLIC BLOOD PRESSURE: 104 MMHG | WEIGHT: 147.7 LBS | HEART RATE: 82 BPM | TEMPERATURE: 97.5 F

## 2018-04-04 DIAGNOSIS — R05.9 COUGH IN ADULT: Primary | ICD-10-CM

## 2018-04-04 DIAGNOSIS — J20.9 ACUTE BRONCHITIS, UNSPECIFIED ORGANISM: ICD-10-CM

## 2018-04-04 PROBLEM — M70.51 BURSITIS OF RIGHT KNEE: Status: ACTIVE | Noted: 2017-05-30

## 2018-04-04 PROBLEM — F41.9 ANXIETY: Status: ACTIVE | Noted: 2017-05-01

## 2018-04-04 PROBLEM — M54.2 NECK PAIN: Status: ACTIVE | Noted: 2017-03-17

## 2018-04-04 PROBLEM — D64.9 ANEMIA: Status: ACTIVE | Noted: 2017-05-30

## 2018-04-04 PROCEDURE — 99213 OFFICE O/P EST LOW 20 MIN: CPT | Performed by: PHYSICIAN ASSISTANT

## 2018-04-04 RX ORDER — ACETAMINOPHEN AND CODEINE PHOSPHATE 300; 30 MG/1; MG/1
TABLET ORAL
COMMUNITY
Start: 2017-05-30 | End: 2021-07-20

## 2018-04-04 RX ORDER — DIAZEPAM 5 MG/1
TABLET ORAL
COMMUNITY
Start: 2017-05-01 | End: 2019-11-13 | Stop reason: ALTCHOICE

## 2018-04-04 RX ORDER — EPINEPHRINE 0.3 MG/.3ML
INJECTION SUBCUTANEOUS
COMMUNITY
Start: 2016-08-12 | End: 2020-06-18 | Stop reason: SDUPTHER

## 2018-04-04 RX ORDER — IBUPROFEN 400 MG/1
1 TABLET ORAL 3 TIMES DAILY PRN
COMMUNITY
Start: 2015-05-29 | End: 2019-04-10 | Stop reason: SDUPTHER

## 2018-04-04 RX ORDER — ALLOPURINOL 100 MG/1
2 TABLET ORAL DAILY
COMMUNITY
Start: 2017-04-12 | End: 2019-11-13 | Stop reason: ALTCHOICE

## 2018-04-04 RX ORDER — LOSARTAN POTASSIUM AND HYDROCHLOROTHIAZIDE 12.5; 5 MG/1; MG/1
TABLET ORAL
COMMUNITY
Start: 2018-02-20 | End: 2018-08-03 | Stop reason: SDUPTHER

## 2018-04-04 RX ORDER — PROMETHAZINE HYDROCHLORIDE AND CODEINE PHOSPHATE 6.25; 1 MG/5ML; MG/5ML
5 SYRUP ORAL EVERY 4 HOURS PRN
Qty: 120 ML | Refills: 0 | Status: SHIPPED | OUTPATIENT
Start: 2018-04-04 | End: 2019-08-29

## 2018-04-04 RX ORDER — COLCHICINE 0.6 MG/1
TABLET ORAL
COMMUNITY
Start: 2017-05-30 | End: 2019-11-13 | Stop reason: ALTCHOICE

## 2018-04-04 RX ORDER — BUDESONIDE AND FORMOTEROL FUMARATE DIHYDRATE 160; 4.5 UG/1; UG/1
AEROSOL RESPIRATORY (INHALATION)
COMMUNITY
Start: 2015-01-03 | End: 2019-01-22 | Stop reason: SDUPTHER

## 2018-04-04 RX ORDER — FLUTICASONE PROPIONATE 50 MCG
SPRAY, SUSPENSION (ML) NASAL
COMMUNITY
Start: 2015-01-03

## 2018-04-04 RX ORDER — AZITHROMYCIN 250 MG/1
TABLET, FILM COATED ORAL
Qty: 6 TABLET | Refills: 0 | Status: SHIPPED | OUTPATIENT
Start: 2018-04-04 | End: 2018-04-08

## 2018-04-04 NOTE — PATIENT INSTRUCTIONS
Acute Cough   WHAT YOU NEED TO KNOW:   What is an acute cough? An acute cough can last up to 3 weeks  Common causes of an acute cough include a cold, allergies, or a lung infection  How is the cause of an acute cough diagnosed? Your healthcare provider will examine you and listen to your lungs  Tell your healthcare provider if you cough up any mucus, or have a fever or shortness of breath  Also tell your provider what makes the cough better or worse  Depending on your symptoms, you may need a chest x-ray  A sample of mucus may be collected and tested for infection  How is an acute cough treated? An acute cough usually goes away on its own  Ask your healthcare provider about medicines you can take to decrease your cough  You may need medicine to stop the cough, decrease swelling in your airways, or help open your airways  Medicine may also be given to help you cough up mucus  If you have an infection caused by bacteria, you may need antibiotics  What can I do to manage my cough? · Do not smoke and stay away from others who smoke  Nicotine and other chemicals in cigarettes and cigars can cause lung damage and make your cough worse  Ask your healthcare provider for information if you currently smoke and need help to quit  E-cigarettes or smokeless tobacco still contain nicotine  Talk to your healthcare provider before you use these products  · Drink extra liquids as directed  Liquids will help thin and loosen mucus so you can cough it up  Liquids will also help prevent dehydration  Examples of good liquids to drink include water, fruit juice, and broth  Do not drink liquids that contain caffeine  Caffeine can increase your risk for dehydration  Ask your healthcare provider how much liquid to drink each day  · Rest as directed  Do not do activities that make your cough worse, such as exercise  · Use a humidifier or vaporizer    Use a cool mist humidifier or a vaporizer to increase air moisture in your home  This may make it easier for you to breathe and help decrease your cough  · Eat 2 to 5 mL of honey 2 times each day  Honey can help thin mucus and decrease your cough  · Use cough drops or lozenges  These can help decrease throat irritation and your cough  When should I seek immediate care? · You have trouble breathing or feel short of breath  · You cough up blood, or you see blood in your mucus  · You faint or feel weak or dizzy  · You have chest pain when you cough or take a deep breath  · You have new wheezing  When should I contact my healthcare provider? · You have a fever  · Your cough lasts longer than 4 weeks  · Your symptoms do not improve with treatment  · You have questions or concerns about your condition or care  CARE AGREEMENT:   You have the right to help plan your care  Learn about your health condition and how it may be treated  Discuss treatment options with your caregivers to decide what care you want to receive  You always have the right to refuse treatment  The above information is an  only  It is not intended as medical advice for individual conditions or treatments  Talk to your doctor, nurse or pharmacist before following any medical regimen to see if it is safe and effective for you  © 2017 [address]  Information is for End User's use only and may not be sold, redistributed or otherwise used for commercial purposes  All illustrations and images included in CareNotes® are the copyrighted property of A D A M , Inc  or [employer]

## 2018-04-04 NOTE — PROGRESS NOTES
Assessment/Plan:       Diagnoses and all orders for this visit:    Cough in adult    Acute bronchitis, unspecified organism  -     azithromycin (ZITHROMAX) 250 mg tablet; Two tablets day 1 then 1 tablet on day 2 through 5  -     promethazine-codeine (PHENERGAN WITH CODEINE) 6 25-10 mg/5 mL syrup; Take 5 mL by mouth every 4 (four) hours as needed for cough    Other orders  -     acetaminophen-codeine (TYLENOL #3) 300-30 mg per tablet; Take by mouth  -     VENTOLIN  (90 Base) MCG/ACT inhaler;   -     allopurinol (ZYLOPRIM) 100 mg tablet; Take 2 tablets by mouth daily  -     colchicine (COLCRYS) 0 6 mg tablet; Take by mouth  -     diazepam (VALIUM) 5 mg tablet; Take by mouth  -     EPINEPHrine (EPIPEN 2-JESSICA) 0 3 mg/0 3 mL SOAJ; Inject as directed  -     fluticasone (FLONASE) 50 mcg/act nasal spray; into each nostril  -     ibuprofen (MOTRIN) 400 mg tablet; Take 1 tablet by mouth 3 (three) times a day as needed  -     losartan-hydrochlorothiazide (HYZAAR) 50-12 5 mg per tablet;   -     budesonide-formoterol (SYMBICORT) 160-4 5 mcg/act inhaler; Inhale              Subjective:      Patient ID: Wilder Angeles is a 81 y o  female  Cough   This is a new problem  The current episode started in the past 7 days  The problem has been gradually worsening  The problem occurs every few minutes  The cough is productive of purulent sputum  Associated symptoms include headaches, nasal congestion, postnasal drip, a sore throat and wheezing  Nothing aggravates the symptoms  She has tried a beta-agonist inhaler for the symptoms  The treatment provided mild relief  Her past medical history is significant for asthma  Headache    This is a new problem  The current episode started in the past 7 days  The problem occurs daily  The problem has been waxing and waning  The pain is located in the bilateral region  The quality of the pain is described as throbbing  The pain is at a severity of 6/10  The pain is moderate   Associated symptoms include coughing and a sore throat  The symptoms are aggravated by coughing  She has tried acetaminophen for the symptoms  Fatigue   This is a new problem  The current episode started in the past 7 days  The problem occurs daily  The problem has been gradually worsening  Associated symptoms include congestion, coughing, fatigue, headaches and a sore throat  The symptoms are aggravated by coughing  She has tried rest for the symptoms  The following portions of the patient's history were reviewed and updated as appropriate:   She has no past medical history on file  ,   does not have any pertinent problems on file  ,   has no past surgical history on file  ,  family history is not on file  ,   has no tobacco, alcohol, and drug history on file  ,  is allergic to ciprofloxacin; penicillins; amoxicillin; cefprozil; cephalexin; erythromycin; levofloxacin; and sulfa antibiotics     Current Outpatient Prescriptions   Medication Sig Dispense Refill    acetaminophen-codeine (TYLENOL #3) 300-30 mg per tablet Take by mouth      allopurinol (ZYLOPRIM) 100 mg tablet Take 2 tablets by mouth daily      budesonide-formoterol (SYMBICORT) 160-4 5 mcg/act inhaler Inhale      diazepam (VALIUM) 5 mg tablet Take by mouth      EPINEPHrine (EPIPEN 2-JESSICA) 0 3 mg/0 3 mL SOAJ Inject as directed      fluticasone (FLONASE) 50 mcg/act nasal spray into each nostril      ibuprofen (MOTRIN) 400 mg tablet Take 1 tablet by mouth 3 (three) times a day as needed      losartan-hydrochlorothiazide (HYZAAR) 50-12 5 mg per tablet       rosuvastatin (CRESTOR) 10 MG tablet Take 1 tablet (10 mg total) by mouth daily 90 tablet 5    thyroid (ARMOUR THYROID) 90 MG tablet Take 1 tablet (90 mg total) by mouth daily 30 tablet 3    VENTOLIN  (90 Base) MCG/ACT inhaler       azithromycin (ZITHROMAX) 250 mg tablet Two tablets day 1 then 1 tablet on day 2 through 5 6 tablet 0    colchicine (COLCRYS) 0 6 mg tablet Take by mouth      promethazine-codeine (PHENERGAN WITH CODEINE) 6 25-10 mg/5 mL syrup Take 5 mL by mouth every 4 (four) hours as needed for cough 120 mL 0     No current facility-administered medications for this visit  Review of Systems   Constitutional: Positive for fatigue  HENT: Positive for congestion, postnasal drip and sore throat  Respiratory: Positive for cough and wheezing  Neurological: Positive for headaches  Objective:  Vitals:    04/04/18 1142   BP: 104/72   Pulse: 82   Temp: 97 5 °F (36 4 °C)   SpO2: 91%   Weight: 67 kg (147 lb 11 2 oz)   Height: 5' 4" (1 626 m)     Body mass index is 25 35 kg/m²  Physical Exam   Constitutional: She appears well-developed and well-nourished  HENT:   Head: Normocephalic  Right Ear: Tympanic membrane, external ear and ear canal normal    Left Ear: Tympanic membrane, external ear and ear canal normal    Nose: Nose normal    Pulmonary/Chest: Effort normal  She has wheezes in the right upper field, the right middle field, the right lower field, the left upper field, the left middle field and the left lower field  She has rhonchi in the right lower field and the left lower field

## 2018-04-09 ENCOUNTER — TELEPHONE (OUTPATIENT)
Dept: FAMILY MEDICINE CLINIC | Facility: CLINIC | Age: 73
End: 2018-04-09

## 2018-04-09 DIAGNOSIS — J40 BRONCHITIS: Primary | ICD-10-CM

## 2018-04-09 RX ORDER — SULFAMETHOXAZOLE AND TRIMETHOPRIM 800; 160 MG/1; MG/1
1 TABLET ORAL EVERY 12 HOURS SCHEDULED
Qty: 20 TABLET | Refills: 0 | Status: SHIPPED | OUTPATIENT
Start: 2018-04-09 | End: 2018-04-19

## 2018-04-09 NOTE — TELEPHONE ENCOUNTER
Pt said that the z-pack did nothing for her and wants Bactrim -  NARIND[address] [facility] [address]

## 2018-05-29 ENCOUNTER — TELEPHONE (OUTPATIENT)
Dept: FAMILY MEDICINE CLINIC | Facility: CLINIC | Age: 73
End: 2018-05-29

## 2018-05-29 DIAGNOSIS — E79.0 HYPERURICEMIA: ICD-10-CM

## 2018-05-29 DIAGNOSIS — D64.9 ANEMIA, UNSPECIFIED TYPE: ICD-10-CM

## 2018-05-29 DIAGNOSIS — E11.9 TYPE 2 DIABETES MELLITUS WITHOUT COMPLICATION, WITHOUT LONG-TERM CURRENT USE OF INSULIN (HCC): ICD-10-CM

## 2018-05-29 DIAGNOSIS — E03.9 HYPOTHYROIDISM, UNSPECIFIED TYPE: ICD-10-CM

## 2018-05-29 DIAGNOSIS — E78.5 HYPERLIPIDEMIA, UNSPECIFIED HYPERLIPIDEMIA TYPE: Primary | ICD-10-CM

## 2018-05-29 DIAGNOSIS — M10.9 GOUT, UNSPECIFIED CAUSE, UNSPECIFIED CHRONICITY, UNSPECIFIED SITE: ICD-10-CM

## 2018-05-29 NOTE — TELEPHONE ENCOUNTER
Pt called asking for call back from you  She refused to give any details  Please call her at: [phone] or  [phone]

## 2018-06-01 LAB — HBA1C MFR BLD HPLC: 6.2 %

## 2018-06-12 DIAGNOSIS — E78.5 HYPERLIPIDEMIA, UNSPECIFIED HYPERLIPIDEMIA TYPE: ICD-10-CM

## 2018-06-12 RX ORDER — ROSUVASTATIN CALCIUM 10 MG/1
TABLET, COATED ORAL
Qty: 30 TABLET | Refills: 3 | Status: SHIPPED | OUTPATIENT
Start: 2018-06-12 | End: 2018-10-04 | Stop reason: SDUPTHER

## 2018-06-19 DIAGNOSIS — E11.9 TYPE 2 DIABETES MELLITUS WITHOUT COMPLICATIONS (HCC): ICD-10-CM

## 2018-06-19 DIAGNOSIS — E03.9 HYPOTHYROIDISM: ICD-10-CM

## 2018-06-19 DIAGNOSIS — E78.5 HYPERLIPIDEMIA: ICD-10-CM

## 2018-07-08 DIAGNOSIS — E03.9 HYPOTHYROIDISM, UNSPECIFIED TYPE: ICD-10-CM

## 2018-07-09 RX ORDER — THYROID,PORK 90 MG
TABLET ORAL
Qty: 30 TABLET | Refills: 4 | Status: SHIPPED | OUTPATIENT
Start: 2018-07-09 | End: 2018-12-25 | Stop reason: SDUPTHER

## 2018-08-03 DIAGNOSIS — I10 ESSENTIAL HYPERTENSION: Primary | ICD-10-CM

## 2018-08-03 RX ORDER — LOSARTAN POTASSIUM AND HYDROCHLOROTHIAZIDE 12.5; 5 MG/1; MG/1
TABLET ORAL
Qty: 90 TABLET | Refills: 0 | Status: SHIPPED | OUTPATIENT
Start: 2018-08-03 | End: 2018-11-05 | Stop reason: SDUPTHER

## 2018-08-21 ENCOUNTER — TELEPHONE (OUTPATIENT)
Dept: FAMILY MEDICINE CLINIC | Facility: CLINIC | Age: 73
End: 2018-08-21

## 2018-08-21 NOTE — TELEPHONE ENCOUNTER
Medicine called in to [facility] as ordered on other chart    Pt notified

## 2018-10-04 DIAGNOSIS — E78.5 HYPERLIPIDEMIA, UNSPECIFIED HYPERLIPIDEMIA TYPE: ICD-10-CM

## 2018-10-04 RX ORDER — ROSUVASTATIN CALCIUM 10 MG/1
TABLET, COATED ORAL
Qty: 30 TABLET | Refills: 3 | Status: SHIPPED | OUTPATIENT
Start: 2018-10-04 | End: 2019-01-26 | Stop reason: SDUPTHER

## 2018-10-18 ENCOUNTER — OFFICE VISIT (OUTPATIENT)
Dept: FAMILY MEDICINE CLINIC | Facility: CLINIC | Age: 73
End: 2018-10-18
Payer: MEDICARE

## 2018-10-18 ENCOUNTER — TELEPHONE (OUTPATIENT)
Dept: FAMILY MEDICINE CLINIC | Facility: CLINIC | Age: 73
End: 2018-10-18

## 2018-10-18 VITALS
HEART RATE: 100 BPM | DIASTOLIC BLOOD PRESSURE: 64 MMHG | SYSTOLIC BLOOD PRESSURE: 110 MMHG | TEMPERATURE: 99.1 F | OXYGEN SATURATION: 92 % | BODY MASS INDEX: 25.27 KG/M2 | WEIGHT: 148 LBS | HEIGHT: 64 IN

## 2018-10-18 DIAGNOSIS — R10.32 LEFT LOWER QUADRANT PAIN: Primary | ICD-10-CM

## 2018-10-18 DIAGNOSIS — M10.9 GOUT, UNSPECIFIED CAUSE, UNSPECIFIED CHRONICITY, UNSPECIFIED SITE: ICD-10-CM

## 2018-10-18 PROCEDURE — 99214 OFFICE O/P EST MOD 30 MIN: CPT | Performed by: FAMILY MEDICINE

## 2018-10-18 RX ORDER — INDOMETHACIN 25 MG/1
25 CAPSULE ORAL 2 TIMES DAILY WITH MEALS
Qty: 30 CAPSULE | Refills: 1 | Status: SHIPPED | OUTPATIENT
Start: 2018-10-18 | End: 2018-10-18 | Stop reason: SDUPTHER

## 2018-10-18 RX ORDER — METRONIDAZOLE 500 MG/1
500 TABLET ORAL 3 TIMES DAILY
Qty: 30 TABLET | Refills: 0 | Status: SHIPPED | OUTPATIENT
Start: 2018-10-18 | End: 2018-10-28

## 2018-10-18 RX ORDER — INDOMETHACIN 25 MG/1
25 CAPSULE ORAL 2 TIMES DAILY WITH MEALS
Qty: 30 CAPSULE | Refills: 0 | Status: SHIPPED | OUTPATIENT
Start: 2018-10-18 | End: 2019-11-13 | Stop reason: ALTCHOICE

## 2018-10-18 RX ORDER — SULFAMETHOXAZOLE AND TRIMETHOPRIM 800; 160 MG/1; MG/1
1 TABLET ORAL 2 TIMES DAILY
Qty: 20 TABLET | Refills: 0 | Status: SHIPPED | OUTPATIENT
Start: 2018-10-18 | End: 2018-10-28

## 2018-10-18 RX ORDER — METRONIDAZOLE 500 MG/1
500 TABLET ORAL 3 TIMES DAILY
Qty: 30 TABLET | Refills: 0 | Status: SHIPPED | OUTPATIENT
Start: 2018-10-18 | End: 2018-10-18 | Stop reason: SDUPTHER

## 2018-10-18 RX ORDER — SULFAMETHOXAZOLE AND TRIMETHOPRIM 800; 160 MG/1; MG/1
1 TABLET ORAL 2 TIMES DAILY
Qty: 20 TABLET | Refills: 0 | Status: SHIPPED | OUTPATIENT
Start: 2018-10-18 | End: 2018-10-18 | Stop reason: SDUPTHER

## 2018-10-18 NOTE — TELEPHONE ENCOUNTER
Patient called and stated she fell  She currently has swelling on her elbow and pain in her side  I offered to give her an appointment with Dr Martha Go who has one opening today  She refused and wants you to call her instead  She can be reached at [phone]

## 2018-10-18 NOTE — PROGRESS NOTES
Assessment/Plan:    Return with regular appointment       Problem List Items Addressed This Visit     Gout    Relevant Medications    indomethacin (INDOCIN) 25 mg capsule      Other Visit Diagnoses     Left lower quadrant pain    -  Primary    Relevant Medications    metroNIDAZOLE (FLAGYL) 500 mg tablet    sulfamethoxazole-trimethoprim (BACTRIM DS) 800-160 mg per tablet    Other Relevant Orders    CT abdomen pelvis wo contrast            Subjective:      Patient ID: Ronnie Garcia is a 72 y o  female  Patient comes in with a 24 hr history of left lower quadrant abdominal pain  She also has developed a sore swollen right elbow  She has some loose stools and feels feverish        The following portions of the patient's history were reviewed and updated as appropriate: allergies, current medications, past family history, past medical history, past social history, past surgical history and problem list     Review of Systems   Constitutional: Positive for fever  HENT: Negative  Respiratory: Negative  Cardiovascular: Negative  Gastrointestinal: Negative for nausea  Objective:      /64   Pulse 100   Temp 99 1 °F (37 3 °C)   Ht 5' 4" (1 626 m)   Wt 67 1 kg (148 lb)   SpO2 92%   BMI 25 40 kg/m²          Physical Exam   Constitutional: She is oriented to person, place, and time  She appears well-developed  HENT:   Head: Normocephalic and atraumatic  Mouth/Throat: Oropharynx is clear and moist    Eyes: Pupils are equal, round, and reactive to light  EOM are normal    Neck: Neck supple  Cardiovascular: Normal rate, regular rhythm and normal heart sounds  Pulmonary/Chest: Effort normal and breath sounds normal    Abdominal: Soft  Bowel sounds are normal    Moderate tenderness left lower quadrant   Musculoskeletal: Normal range of motion  Mild swelling of right elbow with mild redness and warmth to touch   Neurological: She is alert and oriented to person, place, and time     Skin: Skin is warm and dry  Psychiatric: She has a normal mood and affect

## 2018-11-05 DIAGNOSIS — I10 ESSENTIAL HYPERTENSION: ICD-10-CM

## 2018-11-05 RX ORDER — LOSARTAN POTASSIUM AND HYDROCHLOROTHIAZIDE 12.5; 5 MG/1; MG/1
1 TABLET ORAL DAILY
Qty: 90 TABLET | Refills: 3 | Status: SHIPPED | OUTPATIENT
Start: 2018-11-05 | End: 2019-12-05 | Stop reason: SDUPTHER

## 2018-11-06 ENCOUNTER — TELEPHONE (OUTPATIENT)
Dept: FAMILY MEDICINE CLINIC | Facility: CLINIC | Age: 73
End: 2018-11-06

## 2018-11-06 DIAGNOSIS — J30.2 SEASONAL ALLERGIC RHINITIS, UNSPECIFIED TRIGGER: Primary | ICD-10-CM

## 2018-11-06 RX ORDER — METHYLPREDNISOLONE 4 MG/1
TABLET ORAL
Qty: 21 TABLET | Refills: 0 | Status: SHIPPED | OUTPATIENT
Start: 2018-11-06 | End: 2018-12-03

## 2018-11-06 RX ORDER — METHYLPREDNISOLONE 4 MG/1
TABLET ORAL
Qty: 21 TABLET | Refills: 0 | Status: SHIPPED | OUTPATIENT
Start: 2018-11-06 | End: 2018-11-06 | Stop reason: SDUPTHER

## 2018-12-03 DIAGNOSIS — J06.9 URI, ACUTE: Primary | ICD-10-CM

## 2018-12-03 RX ORDER — AZITHROMYCIN 250 MG/1
TABLET, FILM COATED ORAL
Qty: 6 TABLET | Refills: 0 | Status: SHIPPED | OUTPATIENT
Start: 2018-12-03 | End: 2018-12-03 | Stop reason: SDUPTHER

## 2018-12-03 RX ORDER — AZITHROMYCIN 250 MG/1
TABLET, FILM COATED ORAL
Qty: 6 TABLET | Refills: 0 | Status: SHIPPED | OUTPATIENT
Start: 2018-12-03 | End: 2018-12-07

## 2018-12-25 DIAGNOSIS — E03.9 HYPOTHYROIDISM, UNSPECIFIED TYPE: ICD-10-CM

## 2018-12-26 RX ORDER — THYROID,PORK 90 MG
TABLET ORAL
Qty: 30 TABLET | Refills: 4 | Status: SHIPPED | OUTPATIENT
Start: 2018-12-26 | End: 2019-05-28 | Stop reason: SDUPTHER

## 2018-12-31 DIAGNOSIS — J06.9 URI, ACUTE: Primary | ICD-10-CM

## 2018-12-31 RX ORDER — AZITHROMYCIN 250 MG/1
TABLET, FILM COATED ORAL
Qty: 6 TABLET | Refills: 0 | Status: SHIPPED | OUTPATIENT
Start: 2018-12-31 | End: 2019-01-04

## 2019-01-22 DIAGNOSIS — J45.909 UNCOMPLICATED ASTHMA, UNSPECIFIED ASTHMA SEVERITY, UNSPECIFIED WHETHER PERSISTENT: Primary | ICD-10-CM

## 2019-01-22 RX ORDER — BUDESONIDE AND FORMOTEROL FUMARATE DIHYDRATE 160; 4.5 UG/1; UG/1
2 AEROSOL RESPIRATORY (INHALATION) 2 TIMES DAILY
Qty: 1 INHALER | Refills: 5 | Status: SHIPPED | OUTPATIENT
Start: 2019-01-22 | End: 2019-08-20 | Stop reason: SDUPTHER

## 2019-01-26 DIAGNOSIS — E78.5 HYPERLIPIDEMIA, UNSPECIFIED HYPERLIPIDEMIA TYPE: ICD-10-CM

## 2019-01-27 RX ORDER — ROSUVASTATIN CALCIUM 10 MG/1
TABLET, COATED ORAL
Qty: 30 TABLET | Refills: 3 | Status: SHIPPED | OUTPATIENT
Start: 2019-01-27 | End: 2019-11-13 | Stop reason: ALTCHOICE

## 2019-03-26 ENCOUNTER — OFFICE VISIT (OUTPATIENT)
Dept: FAMILY MEDICINE CLINIC | Facility: CLINIC | Age: 74
End: 2019-03-26
Payer: MEDICARE

## 2019-03-26 VITALS
WEIGHT: 153 LBS | HEART RATE: 92 BPM | SYSTOLIC BLOOD PRESSURE: 130 MMHG | BODY MASS INDEX: 26.12 KG/M2 | OXYGEN SATURATION: 94 % | DIASTOLIC BLOOD PRESSURE: 70 MMHG | RESPIRATION RATE: 16 BRPM | TEMPERATURE: 97.3 F | HEIGHT: 64 IN

## 2019-03-26 DIAGNOSIS — D64.9 ANEMIA, UNSPECIFIED TYPE: ICD-10-CM

## 2019-03-26 DIAGNOSIS — E03.9 HYPOTHYROIDISM, UNSPECIFIED TYPE: Primary | ICD-10-CM

## 2019-03-26 DIAGNOSIS — R73.03 PREDIABETES: ICD-10-CM

## 2019-03-26 DIAGNOSIS — E79.0 HYPERURICEMIA: ICD-10-CM

## 2019-03-26 DIAGNOSIS — G47.62 NOCTURNAL LEG CRAMPS: ICD-10-CM

## 2019-03-26 DIAGNOSIS — E78.5 HYPERLIPIDEMIA, UNSPECIFIED HYPERLIPIDEMIA TYPE: ICD-10-CM

## 2019-03-26 PROCEDURE — 99214 OFFICE O/P EST MOD 30 MIN: CPT | Performed by: FAMILY MEDICINE

## 2019-03-26 NOTE — PROGRESS NOTES
Assessment/Plan:           Problem List Items Addressed This Visit        Endocrine    Hypothyroidism - Primary     Continue [place] Thyroid 90 milligrams daily         Relevant Orders    T3, uptake    T4, free    TSH, 3rd generation with Free T4 reflex       Other    Anemia    Relevant Orders    CBC and differential    Hyperlipidemia     Continue rosuvastatin 10 milligrams, decrease frequency to every other day         Relevant Orders    Comprehensive metabolic panel    Lipid panel    Hyperuricemia     Continue allopurinol 100 milligrams 2 daily         Relevant Orders    Uric acid    Nocturnal leg cramps    Relevant Orders    Magnesium    Prediabetes     Low carb diet         Relevant Orders    Hemoglobin A1C            Subjective:      Patient ID: Ene Sanchez is a 72 y o  female  Patient comes in for checkup  She is complaining of severe leg cramps the last few weeks  These occur at night and wake her up from sleep  The following portions of the patient's history were reviewed and updated as appropriate: allergies, current medications, past family history, past medical history, past social history, past surgical history and problem list     Review of Systems   Constitutional: Negative  HENT: Negative  Respiratory: Negative  Cardiovascular: Negative  Objective:      /70   Pulse 92   Temp (!) 97 3 °F (36 3 °C)   Resp 16   Ht 5' 4" (1 626 m)   Wt 69 4 kg (153 lb)   SpO2 94%   BMI 26 26 kg/m²          Physical Exam   Constitutional: She is oriented to person, place, and time  She appears well-developed  HENT:   Head: Normocephalic and atraumatic  Right Ear: Tympanic membrane and external ear normal    Left Ear: Tympanic membrane and external ear normal    Mouth/Throat: Oropharynx is clear and moist    Eyes: Pupils are equal, round, and reactive to light  EOM are normal    Neck: Neck supple  No thyromegaly present     Cardiovascular: Normal rate, regular rhythm, normal heart sounds and intact distal pulses  Pulmonary/Chest: Effort normal and breath sounds normal    Abdominal: Soft  Musculoskeletal: Normal range of motion  Straight leg raising is negative bilaterally   Lymphadenopathy:     She has no cervical adenopathy  Neurological: She is alert and oriented to person, place, and time  Skin: Skin is warm and dry  Psychiatric: She has a normal mood and affect

## 2019-03-27 LAB — HBA1C MFR BLD HPLC: 6.3 %

## 2019-04-10 DIAGNOSIS — M19.90 ARTHRITIS: Primary | ICD-10-CM

## 2019-04-10 RX ORDER — IBUPROFEN 400 MG/1
TABLET ORAL
Qty: 90 TABLET | Refills: 4 | Status: SHIPPED | OUTPATIENT
Start: 2019-04-10 | End: 2020-03-20

## 2019-05-28 DIAGNOSIS — E03.9 HYPOTHYROIDISM, UNSPECIFIED TYPE: ICD-10-CM

## 2019-05-28 RX ORDER — THYROID,PORK 90 MG
TABLET ORAL
Qty: 30 TABLET | Refills: 4 | Status: SHIPPED | OUTPATIENT
Start: 2019-05-28 | End: 2019-08-29 | Stop reason: SDUPTHER

## 2019-08-03 ENCOUNTER — TELEPHONE (OUTPATIENT)
Dept: OTHER | Facility: OTHER | Age: 74
End: 2019-08-03

## 2019-08-03 NOTE — TELEPHONE ENCOUNTER
PT  Called in to ask to make a physical as soon as she can with the office  PT would like a call back

## 2019-08-20 DIAGNOSIS — J45.909 UNCOMPLICATED ASTHMA, UNSPECIFIED ASTHMA SEVERITY, UNSPECIFIED WHETHER PERSISTENT: ICD-10-CM

## 2019-08-20 RX ORDER — BUDESONIDE AND FORMOTEROL FUMARATE DIHYDRATE 160; 4.5 UG/1; UG/1
AEROSOL RESPIRATORY (INHALATION)
Qty: 10.2 G | Refills: 4 | Status: SHIPPED | OUTPATIENT
Start: 2019-08-20 | End: 2020-01-09

## 2019-08-29 ENCOUNTER — OFFICE VISIT (OUTPATIENT)
Dept: FAMILY MEDICINE CLINIC | Facility: CLINIC | Age: 74
End: 2019-08-29
Payer: MEDICARE

## 2019-08-29 VITALS
TEMPERATURE: 98.5 F | HEART RATE: 85 BPM | OXYGEN SATURATION: 97 % | DIASTOLIC BLOOD PRESSURE: 70 MMHG | RESPIRATION RATE: 16 BRPM | SYSTOLIC BLOOD PRESSURE: 110 MMHG | WEIGHT: 145 LBS | BODY MASS INDEX: 24.75 KG/M2 | HEIGHT: 64 IN

## 2019-08-29 DIAGNOSIS — D64.9 ANEMIA, UNSPECIFIED TYPE: ICD-10-CM

## 2019-08-29 DIAGNOSIS — R07.9 CHEST PAIN, UNSPECIFIED TYPE: Primary | ICD-10-CM

## 2019-08-29 DIAGNOSIS — Z00.00 MEDICARE ANNUAL WELLNESS VISIT, SUBSEQUENT: ICD-10-CM

## 2019-08-29 DIAGNOSIS — M10.9 GOUT, UNSPECIFIED CAUSE, UNSPECIFIED CHRONICITY, UNSPECIFIED SITE: ICD-10-CM

## 2019-08-29 DIAGNOSIS — J45.909 UNCOMPLICATED ASTHMA, UNSPECIFIED ASTHMA SEVERITY, UNSPECIFIED WHETHER PERSISTENT: ICD-10-CM

## 2019-08-29 DIAGNOSIS — E78.5 HYPERLIPIDEMIA, UNSPECIFIED HYPERLIPIDEMIA TYPE: ICD-10-CM

## 2019-08-29 DIAGNOSIS — I10 ESSENTIAL HYPERTENSION: ICD-10-CM

## 2019-08-29 DIAGNOSIS — E03.9 HYPOTHYROIDISM, UNSPECIFIED TYPE: ICD-10-CM

## 2019-08-29 DIAGNOSIS — R73.03 PREDIABETES: ICD-10-CM

## 2019-08-29 PROBLEM — Z85.3 HISTORY OF BREAST CANCER: Status: ACTIVE | Noted: 2018-01-09

## 2019-08-29 PROBLEM — J45.30 MILD PERSISTENT ASTHMA WITHOUT COMPLICATION: Status: ACTIVE | Noted: 2018-01-09

## 2019-08-29 PROBLEM — R05.9 COUGH IN ADULT: Status: RESOLVED | Noted: 2018-04-04 | Resolved: 2019-08-29

## 2019-08-29 PROBLEM — J45.30 MILD PERSISTENT ASTHMA WITHOUT COMPLICATION: Status: RESOLVED | Noted: 2018-01-09 | Resolved: 2019-08-29

## 2019-08-29 PROCEDURE — 99214 OFFICE O/P EST MOD 30 MIN: CPT | Performed by: FAMILY MEDICINE

## 2019-08-29 PROCEDURE — G0439 PPPS, SUBSEQ VISIT: HCPCS | Performed by: FAMILY MEDICINE

## 2019-08-29 PROCEDURE — 93010 ELECTROCARDIOGRAM REPORT: CPT | Performed by: FAMILY MEDICINE

## 2019-08-29 RX ORDER — LEVOTHYROXINE AND LIOTHYRONINE 57; 13.5 UG/1; UG/1
90 TABLET ORAL DAILY
Qty: 30 TABLET | Refills: 5 | Status: SHIPPED | OUTPATIENT
Start: 2019-08-29 | End: 2019-11-13 | Stop reason: SDUPTHER

## 2019-08-29 NOTE — PATIENT INSTRUCTIONS
Obesity   AMBULATORY CARE:   Obesity  is when your body mass index (BMI) is greater than 30  Your healthcare provider will use your height and weight to measure your BMI  The risks of obesity include  many health problems, such as injuries or physical disability  You may need tests to check for the following:  · Diabetes     · High blood pressure or high cholesterol     · Heart disease     · Gallbladder or liver disease     · Cancer of the colon, breast, prostate, liver, or kidney     · Sleep apnea     · Arthritis or gout  Seek care immediately if:   · You have a severe headache, confusion, or difficulty speaking  · You have weakness on one side of your body  · You have chest pain, sweating, or shortness of breath  Contact your healthcare provider if:   · You have symptoms of gallbladder or liver disease, such as pain in your upper abdomen  · You have knee or hip pain and discomfort while walking  · You have symptoms of diabetes, such as intense hunger and thirst, and frequent urination  · You have symptoms of sleep apnea, such as snoring or daytime sleepiness  · You have questions or concerns about your condition or care  Treatment for obesity  focuses on helping you lose weight to improve your health  Even a small decrease in BMI can reduce the risk for many health problems  Your healthcare provider will help you set a weight-loss goal   · Lifestyle changes  are the first step in treating obesity  These include making healthy food choices and getting regular physical activity  Your healthcare provider may suggest a weight-loss program that involves coaching, education, and therapy  · Medicine  may help you lose weight when it is used with a healthy diet and physical activity  · Surgery  can help you lose weight if you are very obese and have other health problems  There are several types of weight-loss surgery  Ask your healthcare provider for more information    Be successful losing weight:   · Set small, realistic goals  An example of a small goal is to walk for 20 minutes 5 days a week  Anther goal is to lose 5% of your body weight  · Tell friends, family members, and coworkers about your goals  and ask for their support  Ask a friend to lose weight with you, or join a weight-loss support group  · Identify foods or triggers that may cause you to overeat , and find ways to avoid them  Remove tempting high-calorie foods from your home and workplace  Place a bowl of fresh fruit on your kitchen counter  If stress causes you to eat, then find other ways to cope with stress  · Keep a diary to track what you eat and drink  Also write down how many minutes of physical activity you do each day  Weigh yourself once a week and record it in your diary  Eating changes: You will need to eat 500 to 1,000 fewer calories each day than you currently eat to lose 1 to 2 pounds a week  The following changes will help you cut calories:  · Eat smaller portions  Use small plates, no larger than 9 inches in diameter  Fill your plate half full of fruits and vegetables  Measure your food using measuring cups until you know what a serving size looks like  · Eat 3 meals and 1 or 2 snacks each day  Plan your meals in advance  Vanita More and eat at home most of the time  Eat slowly  · Eat fruits and vegetables at every meal   They are low in calories and high in fiber, which makes you feel full  Do not add butter, margarine, or cream sauce to vegetables  Use herbs to season steamed vegetables  · Eat less fat and fewer fried foods  Eat more baked or grilled chicken and fish  These protein sources are lower in calories and fat than red meat  Limit fast food  Dress your salads with olive oil and vinegar instead of bottled dressing  · Limit the amount of sugar you eat  Do not drink sugary beverages  Limit alcohol  Activity changes:  Physical activity is good for your body in many ways   It helps you burn calories and build strong muscles  It decreases stress and depression, and improves your mood  It can also help you sleep better  Talk to your healthcare provider before you begin an exercise program   · Exercise for at least 30 minutes 5 days a week  Start slowly  Set aside time each day for physical activity that you enjoy and that is convenient for you  It is best to do both weight training and an activity that increases your heart rate, such as walking, bicycling, or swimming  · Find ways to be more active  Do yard work and housecleaning  Walk up the stairs instead of using elevators  Spend your leisure time going to events that require walking, such as outdoor festivals or fairs  This extra physical activity can help you lose weight and keep it off  Follow up with your healthcare provider as directed: You may need to meet with a dietitian  Write down your questions so you remember to ask them during your visits  © 2017 [address] [employer] Information is for End User's use only and may not be sold, redistributed or otherwise used for commercial purposes  All illustrations and images included in CareNotes® are the copyrighted property of [employer]  or Khanh Carballo  The above information is an  only  It is not intended as medical advice for individual conditions or treatments  Talk to your doctor, nurse or pharmacist before following any medical regimen to see if it is safe and effective for you  Urinary Incontinence   WHAT YOU NEED TO KNOW:   What is urinary incontinence? Urinary incontinence (UI) is when you lose control of your bladder  What causes UI? UI occurs because your bladder cannot store or empty urine properly  The following are the most common types of UI:  · Stress incontinence  is when you leak urine due to increased bladder pressure  This may happen when you cough, sneeze, or exercise       · Urge incontinence  is when you feel the need to urinate right away and leak urine accidentally  · Mixed incontinence  is when you have both stress and urge UI  What are the signs and symptoms of UI?   · You feel like your bladder does not empty completely when you urinate  · You urinate often and need to urinate immediately  · You leak urine when you sleep, or you wake up with the urge to urinate  · You leak urine when you cough, sneeze, exercise, or laugh  How is UI diagnosed? Your healthcare provider will ask how often you leak urine and whether you have stress or urge symptoms  Tell him which medicines you take, how often you urinate, and how much liquid you drink each day  You may need any of the following tests:  · Urine tests  may show infection or kidney function  · A pelvic exam  may be done to check for blockages  A pelvic exam will also show if your bladder, uterus, or other organs have moved out of place  · An x-ray, ultrasound, or CT  may show problems with parts of your urinary system  You may be given contrast liquid to help your organs show up better in the pictures  Tell the healthcare provider if you have ever had an allergic reaction to contrast liquid  Do not enter the MRI room with anything metal  Metal can cause serious injury  Tell the healthcare provider if you have any metal in or on your body  · A bladder scan  will show how much urine is left in your bladder after you urinate  You will be asked to urinate and then healthcare providers will use a small ultrasound machine to check the urine left in your bladder  · Cystometry  is used to check the function of your urinary system  Your healthcare provider checks the pressure in your bladder while filling it with fluid  Your bladder pressure may also be tested when your bladder is full and while you urinate  How is UI treated? · Medicines  can help strengthen your bladder control      · Electrical stimulation  is used to send a small amount of electrical energy to your pelvic floor muscles  This helps control your bladder function  Electrodes may be placed outside your body or in your rectum  For women, the electrodes may be placed in the vagina  · A bulking agent  may be injected into the wall of your urethra to make it thicker  This helps keep your urethra closed and decreases urine leakage  · Devices  such as a clamp, pessary, or tampon may help stop urine leaks  Ask your healthcare provider for more information about these and other devices  · Surgery  may be needed if other treatments do not work  Several types of surgery can help improve your bladder control  Ask your healthcare provider for more information about the surgery you may need  How can I manage my symptoms? · Do pelvic muscle exercises often  Your pelvic muscles help you stop urinating  Squeeze these muscles tight for 5 seconds, then relax for 5 seconds  Gradually work up to squeezing for 10 seconds  Do 3 sets of 15 repetitions a day, or as directed  This will help strengthen your pelvic muscles and improve bladder control  · A catheter  may be used to help empty your bladder  A catheter is a tiny, plastic tube that is put into your bladder to drain your urine  Your healthcare provider may tell you to use a catheter to prevent your bladder from getting too full and leaking urine  · Keep a UI record  Write down how often you leak urine and how much you leak  Make a note of what you were doing when you leaked urine  · Train your bladder  Go to the bathroom at set times, such as every 2 hours, even if you do not feel the urge to go  You can also try to hold your urine when you feel the urge to go  For example, hold your urine for 5 minutes when you feel the urge to go  As that becomes easier, hold your urine for 10 minutes  · Drink liquids as directed  Ask your healthcare provider how much liquid to drink each day and which liquids are best for you   You may need to limit the amount of liquid you drink to help control your urine leakage  Limit or do not have drinks that contain caffeine or alcohol  Do not drink any liquid right before you go to bed  · Prevent constipation  Eat a variety of high-fiber foods  Good examples are high-fiber cereals, beans, vegetables, and whole-grain breads  Prune juice may help make your bowel movement softer  Walking is the best way to trigger your intestines to have a bowel movement  · Exercise regularly and maintain a healthy weight  Ask your healthcare provider how much you should weigh and about the best exercise plan for you  Weight loss and exercise will decrease pressure on your bladder and help you control your leakage  Ask him to help you create a weight loss plan if you are overweight  When should I seek immediate care? · You have severe pain  · You are confused or cannot think clearly  When should I contact my healthcare provider? · You have a fever  · You see blood in your urine  · You have pain when you urinate  · You have new or worse pain, even after treatment  · Your mouth feels dry or you have vision changes  · Your urine is cloudy or smells bad  · You have questions or concerns about your condition or care  CARE AGREEMENT:   You have the right to help plan your care  Learn about your health condition and how it may be treated  Discuss treatment options with your caregivers to decide what care you want to receive  You always have the right to refuse treatment  The above information is an  only  It is not intended as medical advice for individual conditions or treatments  Talk to your doctor, nurse or pharmacist before following any medical regimen to see if it is safe and effective for you  © 2017 [address] [employer] Information is for End User's use only and may not be sold, redistributed or otherwise used for commercial purposes   All illustrations and images included in CareNotes® are the copyrighted property of [employer] M [employer]  or Khanh Carballo  Cigarette Smoking and Your Health   AMBULATORY CARE:   Risks to your health if you smoke:  Nicotine and other chemicals found in tobacco damage every cell in your body  Even if you are a light smoker, you have an increased risk for cancer, heart disease, and lung disease  If you are pregnant or have diabetes, smoking increases your risk for complications  Benefits to your health if you stop smoking:   · You decrease respiratory symptoms such as coughing, wheezing, and shortness of breath  · You reduce your risk for cancers of the lung, mouth, throat, kidney, bladder, pancreas, stomach, and cervix  If you already have cancer, you increase the benefits of chemotherapy  You also reduce your risk for cancer returning or a second cancer from developing  · You reduce your risk for heart disease, blood clots, heart attack, and stroke  · You reduce your risk for lung infections, and diseases such as pneumonia, asthma, chronic bronchitis, and emphysema  · Your circulation improves  More oxygen can be delivered to your body  If you have diabetes, you lower your risk for complications, such as kidney, artery, and eye diseases  You also lower your risk for nerve damage  Nerve damage can lead to amputations, poor vision, and blindness  · You improve your body's ability to heal and to fight infections  Benefits to the health of others if you stop smoking:  Tobacco is harmful to nonsmokers who breathe in your secondhand smoke  The following are ways the health of others around you may improve when you stop smoking:  · You lower the risks for lung cancer and heart disease in nonsmoking adults  · If you are pregnant, you lower the risk for miscarriage, early delivery, low birth weight, and stillbirth  You also lower your baby's risk for SIDS, obesity, developmental delay, and neurobehavioral problems, such as ADHD  · If you have children, you lower their risk for ear infections, colds, pneumonia, bronchitis, and asthma  For more information and support to stop smoking:   · Smokefree  gov  Phone: 6- 852 - 370-0926  Web Address: www smokefree  gov  Follow up with your healthcare provider as directed:  Write down your questions so you remember to ask them during your visits  © 2017 [address] Information is for End User's use only and may not be sold, redistributed or otherwise used for commercial purposes  All illustrations and images included in CareNotes® are the copyrighted property of [employer] M [employer]  or Khnah Carballo  The above information is an  only  It is not intended as medical advice for individual conditions or treatments  Talk to your doctor, nurse or pharmacist before following any medical regimen to see if it is safe and effective for you  Fall Prevention   AMBULATORY CARE:   Fall prevention  includes ways to make your home and other areas safer  It also includes ways you can move more carefully to prevent a fall  Health conditions that cause changes in your blood pressure, vision, or muscle strength and coordination may increase your risk for falls  Medicines may also increase your risk for falls if they make you dizzy, weak, or sleepy  Call 911 or have someone else call if:   · You have fallen and are unconscious  · You have fallen and cannot move part of your body  Contact your healthcare provider if:   · You have fallen and have pain or a headache  · You have questions or concerns about your condition or care  Fall prevention tips:   · Stand or sit up slowly  This may help you keep your balance and prevent falls  · Use assistive devices as directed  Your healthcare provider may suggest that you use a cane or walker to help you keep your balance  You may need to have grab bars put in your bathroom near the toilet or in the shower      · Wear shoes that fit well and have soles that   Wear shoes both inside and outside  Use slippers with good   Do not wear shoes with high heels  · Wear a personal alarm  This is a device that allows you to call 911 if you fall and need help  Ask your healthcare provider for more information  · Stay active  Exercise can help strengthen your muscles and improve your balance  Your healthcare provider may recommend water aerobics or walking  He or she may also recommend physical therapy to improve your coordination  Never start an exercise program without talking to your healthcare provider first      · Manage your medical conditions  Keep all appointments with your healthcare providers  Visit your eye doctor as directed  Home safety tips:   · Add items to prevent falls in the bathroom  Put nonslip strips on your bath or shower floor to prevent you from slipping  Use a bath mat if you do not have carpet in the bathroom  This will prevent you from falling when you step out of the bath or shower  Use a shower seat so you do not need to stand while you shower  Sit on the toilet or a chair in your bathroom to dry yourself and put on clothing  This will prevent you from losing your balance from drying or dressing yourself while you are standing  · Keep paths clear  Remove books, shoes, and other objects from walkways and stairs  Place cords for telephones and lamps out of the way so that you do not need to walk over them  Tape them down if you cannot move them  Remove small rugs  If you cannot remove a rug, secure it with double-sided tape  This will prevent you from tripping  · Install bright lights in your home  Use night lights to help light paths to the bathroom or kitchen  Always turn on the light before you start walking  · Keep items you use often on shelves within reach  Do not use a step stool to help you reach an item  · Paint or place reflective tape on the edges of your stairs    This will help you see the stairs better  Follow up with your healthcare provider as directed:  Write down your questions so you remember to ask them during your visits  © 2017 [address] Information is for End User's use only and may not be sold, redistributed or otherwise used for commercial purposes  All illustrations and images included in CareNotes® are the copyrighted property of [employer]  or Khanh Carballo  The above information is an  only  It is not intended as medical advice for individual conditions or treatments  Talk to your doctor, nurse or pharmacist before following any medical regimen to see if it is safe and effective for you  Advance Directives   WHAT YOU NEED TO KNOW:   What are advance directives? Advance directives are legal documents that state your wishes and plans for medical care  These plans are made ahead of time in case you lose your ability to make decisions for yourself  Advance directives can apply to any medical decision, such as the treatments you want, and if you want to donate organs  What are the types of advance directives? There are many types of advance directives, and each state has rules about how to use them  You may choose a combination of any of the following:  · Living will: This is a written record of the treatment you want  You can also choose which treatments you do not want, which to limit, and which to stop at a certain time  This includes surgery, medicine, IV fluid, and tube feedings  · Durable power of  for healthcare [facility]): This is a written record that states who you want to make healthcare choices for you when you are unable to make them for yourself  This person, called a proxy, is usually a family member or a friend  You may choose more than 1 proxy  · Do not resuscitate (DNR) order:  A DNR order is used in case your heart stops beating or you stop breathing   It is a request not to have certain forms of treatment, such as CPR  A DNR order may be included in other types of advance directives  · Medical directive: This covers the care that you want if you are in a coma, near death, or unable to make decisions for yourself  You can list the treatments you want for each condition  Treatment may include pain medicine, surgery, blood transfusions, dialysis, IV or tube feedings, and a ventilator (breathing machine)  · Values history: This document has questions about your views, beliefs, and how you feel and think about life  This information can help others choose the care that you would choose  Why are advance directives important? An advance directive helps you control your care  Although spoken wishes may be used, it is better to have your wishes written down  Spoken wishes can be misunderstood, or not followed  Treatments may be given even if you do not want them  An advance directive may make it easier for your family to make difficult choices about your care  How do I decide what to put in my advance directives? · Make informed decisions:  Make sure you fully understand treatments or care you may receive  Think about the benefits and problems your decisions could cause for you or your family  Talk to healthcare providers if you have concerns or questions before you write down your wishes  You may also want to talk with your [employer] or , or a   Check your state laws to make sure that what you put in your advance directive is legal      · Sign all forms:  Sign and date your advance directive when you have finished  You may also need 2 witnesses to sign the forms  Witnesses cannot be your doctor or his staff, your spouse, heirs or beneficiaries, people you owe money to, or your chosen proxy  Talk to your family, proxy, and healthcare providers about your advance directive  Give each person a copy, and keep one for yourself in a place you can get to easily   Do not keep it hidden or locked away  · Review and revise your plans: You can revise your advance directive at any time, as long as you are able to make decisions  Review your plan every year, and when there are changes in your life, or your health  When you make changes, let your family, proxy, and healthcare providers know  Give each a new copy  Where can I find more information? · [employer]  [address] , [place] [address]  Phone: 9- 238 - [phone]  Phone: 1- [phone] - [phone]  Web Address: http://www  aafp org  · [address] [facility])  [address] [address] [facility], [address] [facility] , [address] [place] [address]  Phone: [phone]  Phone: [phone]  Web Address: Neelima hagan  CARE AGREEMENT:   You have the right to help plan your care  To help with this plan, you must learn about your health condition and treatment options  You must also learn about advance directives and how they are used  Work with your healthcare providers to decide what care will be used to treat you  You always have the right to refuse treatment  The above information is an  only  It is not intended as medical advice for individual conditions or treatments  Talk to your doctor, nurse or pharmacist before following any medical regimen to see if it is safe and effective for you  © 2017 [address]  Information is for End User's use only and may not be sold, redistributed or otherwise used for commercial purposes  All illustrations and images included in CareNotes® are the copyrighted property of [employer]  or Khanh Carballo  Obesity   AMBULATORY CARE:   Obesity  is when your body mass index (BMI) is greater than 30  Your healthcare provider will use your height and weight to measure your BMI  The risks of obesity include  many health problems, such as injuries or physical disability   You may need tests to check for the following:  · Diabetes     · High blood pressure or high cholesterol     · Heart disease     · Gallbladder or liver disease     · Cancer of the colon, breast, prostate, liver, or kidney     · Sleep apnea     · Arthritis or gout  Seek care immediately if:   · You have a severe headache, confusion, or difficulty speaking  · You have weakness on one side of your body  · You have chest pain, sweating, or shortness of breath  Contact your healthcare provider if:   · You have symptoms of gallbladder or liver disease, such as pain in your upper abdomen  · You have knee or hip pain and discomfort while walking  · You have symptoms of diabetes, such as intense hunger and thirst, and frequent urination  · You have symptoms of sleep apnea, such as snoring or daytime sleepiness  · You have questions or concerns about your condition or care  Treatment for obesity  focuses on helping you lose weight to improve your health  Even a small decrease in BMI can reduce the risk for many health problems  Your healthcare provider will help you set a weight-loss goal   · Lifestyle changes  are the first step in treating obesity  These include making healthy food choices and getting regular physical activity  Your healthcare provider may suggest a weight-loss program that involves coaching, education, and therapy  · Medicine  may help you lose weight when it is used with a healthy diet and physical activity  · Surgery  can help you lose weight if you are very obese and have other health problems  There are several types of weight-loss surgery  Ask your healthcare provider for more information  Be successful losing weight:   · Set small, realistic goals  An example of a small goal is to walk for 20 minutes 5 days a week  Anther goal is to lose 5% of your body weight  · Tell friends, family members, and coworkers about your goals  and ask for their support   Ask a friend to lose weight with you, or join a weight-loss support group  · Identify foods or triggers that may cause you to overeat , and find ways to avoid them  Remove tempting high-calorie foods from your home and workplace  Place a bowl of fresh fruit on your kitchen counter  If stress causes you to eat, then find other ways to cope with stress  · Keep a diary to track what you eat and drink  Also write down how many minutes of physical activity you do each day  Weigh yourself once a week and record it in your diary  Eating changes: You will need to eat 500 to 1,000 fewer calories each day than you currently eat to lose 1 to 2 pounds a week  The following changes will help you cut calories:  · Eat smaller portions  Use small plates, no larger than 9 inches in diameter  Fill your plate half full of fruits and vegetables  Measure your food using measuring cups until you know what a serving size looks like  · Eat 3 meals and 1 or 2 snacks each day  Plan your meals in advance  [place] and eat at home most of the time  Eat slowly  · Eat fruits and vegetables at every meal   They are low in calories and high in fiber, which makes you feel full  Do not add butter, margarine, or cream sauce to vegetables  Use herbs to season steamed vegetables  · Eat less fat and fewer fried foods  Eat more baked or grilled chicken and fish  These protein sources are lower in calories and fat than red meat  Limit fast food  Dress your salads with olive oil and vinegar instead of bottled dressing  · Limit the amount of sugar you eat  Do not drink sugary beverages  Limit alcohol  Activity changes:  Physical activity is good for your body in many ways  It helps you burn calories and build strong muscles  It decreases stress and depression, and improves your mood  It can also help you sleep better  Talk to your healthcare provider before you begin an exercise program   · Exercise for at least 30 minutes 5 days a week  Start slowly   Set aside time each day for physical activity that you enjoy and that is convenient for you  It is best to do both weight training and an activity that increases your heart rate, such as walking, bicycling, or swimming  · Find ways to be more active  Do yard work and housecleaning  Walk up the stairs instead of using elevators  Spend your leisure time going to events that require walking, such as outdoor festivals or fairs  This extra physical activity can help you lose weight and keep it off  Follow up with your healthcare provider as directed: You may need to meet with a dietitian  Write down your questions so you remember to ask them during your visits  © 2017 [address] [employer] Information is for End User's use only and may not be sold, redistributed or otherwise used for commercial purposes  All illustrations and images included in CareNotes® are the copyrighted property of [employer]  or Khanh Carballo  The above information is an  only  It is not intended as medical advice for individual conditions or treatments  Talk to your doctor, nurse or pharmacist before following any medical regimen to see if it is safe and effective for you  Urinary Incontinence   WHAT YOU NEED TO KNOW:   What is urinary incontinence? Urinary incontinence (UI) is when you lose control of your bladder  What causes UI? UI occurs because your bladder cannot store or empty urine properly  The following are the most common types of UI:  · Stress incontinence  is when you leak urine due to increased bladder pressure  This may happen when you cough, sneeze, or exercise  · Urge incontinence  is when you feel the need to urinate right away and leak urine accidentally  · Mixed incontinence  is when you have both stress and urge UI  What are the signs and symptoms of UI?   · You feel like your bladder does not empty completely when you urinate  · You urinate often and need to urinate immediately      · You leak urine when you sleep, or you wake up with the urge to urinate  · You leak urine when you cough, sneeze, exercise, or laugh  How is UI diagnosed? Your healthcare provider will ask how often you leak urine and whether you have stress or urge symptoms  Tell him which medicines you take, how often you urinate, and how much liquid you drink each day  You may need any of the following tests:  · Urine tests  may show infection or kidney function  · A pelvic exam  may be done to check for blockages  A pelvic exam will also show if your bladder, uterus, or other organs have moved out of place  · An x-ray, ultrasound, or CT  may show problems with parts of your urinary system  You may be given contrast liquid to help your organs show up better in the pictures  Tell the healthcare provider if you have ever had an allergic reaction to contrast liquid  Do not enter the MRI room with anything metal  Metal can cause serious injury  Tell the healthcare provider if you have any metal in or on your body  · A bladder scan  will show how much urine is left in your bladder after you urinate  You will be asked to urinate and then healthcare providers will use a small ultrasound machine to check the urine left in your bladder  · Cystometry  is used to check the function of your urinary system  Your healthcare provider checks the pressure in your bladder while filling it with fluid  Your bladder pressure may also be tested when your bladder is full and while you urinate  How is UI treated? · Medicines  can help strengthen your bladder control  · Electrical stimulation  is used to send a small amount of electrical energy to your pelvic floor muscles  This helps control your bladder function  Electrodes may be placed outside your body or in your rectum  For women, the electrodes may be placed in the vagina  · A bulking agent  may be injected into the wall of your urethra to make it thicker   This helps keep your urethra closed and decreases urine leakage  · Devices  such as a clamp, pessary, or tampon may help stop urine leaks  Ask your healthcare provider for more information about these and other devices  · Surgery  may be needed if other treatments do not work  Several types of surgery can help improve your bladder control  Ask your healthcare provider for more information about the surgery you may need  How can I manage my symptoms? · Do pelvic muscle exercises often  Your pelvic muscles help you stop urinating  Squeeze these muscles tight for 5 seconds, then relax for 5 seconds  Gradually work up to squeezing for 10 seconds  Do 3 sets of 15 repetitions a day, or as directed  This will help strengthen your pelvic muscles and improve bladder control  · A catheter  may be used to help empty your bladder  A catheter is a tiny, plastic tube that is put into your bladder to drain your urine  Your healthcare provider may tell you to use a catheter to prevent your bladder from getting too full and leaking urine  · Keep a UI record  Write down how often you leak urine and how much you leak  Make a note of what you were doing when you leaked urine  · Train your bladder  Go to the bathroom at set times, such as every 2 hours, even if you do not feel the urge to go  You can also try to hold your urine when you feel the urge to go  For example, hold your urine for 5 minutes when you feel the urge to go  As that becomes easier, hold your urine for 10 minutes  · Drink liquids as directed  Ask your healthcare provider how much liquid to drink each day and which liquids are best for you  You may need to limit the amount of liquid you drink to help control your urine leakage  Limit or do not have drinks that contain caffeine or alcohol  Do not drink any liquid right before you go to bed  · Prevent constipation  Eat a variety of high-fiber foods   Good examples are high-fiber cereals, beans, vegetables, and whole-grain breads  Prune juice may help make your bowel movement softer  Walking is the best way to trigger your intestines to have a bowel movement  · Exercise regularly and maintain a healthy weight  Ask your healthcare provider how much you should weigh and about the best exercise plan for you  Weight loss and exercise will decrease pressure on your bladder and help you control your leakage  Ask him to help you create a weight loss plan if you are overweight  When should I seek immediate care? · You have severe pain  · You are confused or cannot think clearly  When should I contact my healthcare provider? · You have a fever  · You see blood in your urine  · You have pain when you urinate  · You have new or worse pain, even after treatment  · Your mouth feels dry or you have vision changes  · Your urine is cloudy or smells bad  · You have questions or concerns about your condition or care  CARE AGREEMENT:   You have the right to help plan your care  Learn about your health condition and how it may be treated  Discuss treatment options with your caregivers to decide what care you want to receive  You always have the right to refuse treatment  The above information is an  only  It is not intended as medical advice for individual conditions or treatments  Talk to your doctor, nurse or pharmacist before following any medical regimen to see if it is safe and effective for you  © 2017 [address]  Information is for End User's use only and may not be sold, redistributed or otherwise used for commercial purposes  All illustrations and images included in CareNotes® are the copyrighted property of [employer]  or Khanh Haris  Cigarette Smoking and Your Health   AMBULATORY CARE:   Risks to your health if you smoke:  Nicotine and other chemicals found in tobacco damage every cell in your body   Even if you are a light smoker, you have an increased risk for cancer, heart disease, and lung disease  If you are pregnant or have diabetes, smoking increases your risk for complications  Benefits to your health if you stop smoking:   · You decrease respiratory symptoms such as coughing, wheezing, and shortness of breath  · You reduce your risk for cancers of the lung, mouth, throat, kidney, bladder, pancreas, stomach, and cervix  If you already have cancer, you increase the benefits of chemotherapy  You also reduce your risk for cancer returning or a second cancer from developing  · You reduce your risk for heart disease, blood clots, heart attack, and stroke  · You reduce your risk for lung infections, and diseases such as pneumonia, asthma, chronic bronchitis, and emphysema  · Your circulation improves  More oxygen can be delivered to your body  If you have diabetes, you lower your risk for complications, such as kidney, artery, and eye diseases  You also lower your risk for nerve damage  Nerve damage can lead to amputations, poor vision, and blindness  · You improve your body's ability to heal and to fight infections  Benefits to the health of others if you stop smoking:  Tobacco is harmful to nonsmokers who breathe in your secondhand smoke  The following are ways the health of others around you may improve when you stop smoking:  · You lower the risks for lung cancer and heart disease in nonsmoking adults  · If you are pregnant, you lower the risk for miscarriage, early delivery, low birth weight, and stillbirth  You also lower your baby's risk for SIDS, obesity, developmental delay, and neurobehavioral problems, such as ADHD  · If you have children, you lower their risk for ear infections, colds, pneumonia, bronchitis, and asthma  For more information and support to stop smoking:   · [employer]  Phone: 5- 919 - 611-9018  Web Address: www [employer]  Follow up with your healthcare provider as directed: Write down your questions so you remember to ask them during your visits  © 2017 [address] Information is for End User's use only and may not be sold, redistributed or otherwise used for commercial purposes  All illustrations and images included in CareNotes® are the copyrighted property of [employer] M [employer]  or Khanh Carballo  The above information is an  only  It is not intended as medical advice for individual conditions or treatments  Talk to your doctor, nurse or pharmacist before following any medical regimen to see if it is safe and effective for you  Fall Prevention   AMBULATORY CARE:   Fall prevention  includes ways to make your home and other areas safer  It also includes ways you can move more carefully to prevent a fall  Health conditions that cause changes in your blood pressure, vision, or muscle strength and coordination may increase your risk for falls  Medicines may also increase your risk for falls if they make you dizzy, weak, or sleepy  Call 911 or have someone else call if:   · You have fallen and are unconscious  · You have fallen and cannot move part of your body  Contact your healthcare provider if:   · You have fallen and have pain or a headache  · You have questions or concerns about your condition or care  Fall prevention tips:   · Stand or sit up slowly  This may help you keep your balance and prevent falls  · Use assistive devices as directed  Your healthcare provider may suggest that you use a cane or walker to help you keep your balance  You may need to have grab bars put in your bathroom near the toilet or in the shower  · Wear shoes that fit well and have soles that   Wear shoes both inside and outside  Use slippers with good   Do not wear shoes with high heels  · Wear a personal alarm  This is a device that allows you to call 911 if you fall and need help   Ask your healthcare provider for more information  · Stay active  Exercise can help strengthen your muscles and improve your balance  Your healthcare provider may recommend water aerobics or walking  He or she may also recommend physical therapy to improve your coordination  Never start an exercise program without talking to your healthcare provider first      · Manage your medical conditions  Keep all appointments with your healthcare providers  Visit your eye doctor as directed  Home safety tips:   · Add items to prevent falls in the bathroom  Put nonslip strips on your bath or shower floor to prevent you from slipping  Use a bath mat if you do not have carpet in the bathroom  This will prevent you from falling when you step out of the bath or shower  Use a shower seat so you do not need to stand while you shower  Sit on the toilet or a chair in your bathroom to dry yourself and put on clothing  This will prevent you from losing your balance from drying or dressing yourself while you are standing  · Keep paths clear  Remove books, shoes, and other objects from walkways and stairs  Place cords for telephones and lamps out of the way so that you do not need to walk over them  Tape them down if you cannot move them  Remove small rugs  If you cannot remove a rug, secure it with double-sided tape  This will prevent you from tripping  · Install bright lights in your home  Use night lights to help light paths to the bathroom or kitchen  Always turn on the light before you start walking  · Keep items you use often on shelves within reach  Do not use a step stool to help you reach an item  · Paint or place reflective tape on the edges of your stairs  This will help you see the stairs better  Follow up with your healthcare provider as directed:  Write down your questions so you remember to ask them during your visits     © 2017 [address] [employer] [employer] Information is for End User's use only and may not be sold, redistributed or otherwise used for commercial purposes  All illustrations and images included in CareNotes® are the copyrighted property of [employer]  or Khanh Carballo  The above information is an  only  It is not intended as medical advice for individual conditions or treatments  Talk to your doctor, nurse or pharmacist before following any medical regimen to see if it is safe and effective for you  Advance Directives   WHAT YOU NEED TO KNOW:   What are advance directives? Advance directives are legal documents that state your wishes and plans for medical care  These plans are made ahead of time in case you lose your ability to make decisions for yourself  Advance directives can apply to any medical decision, such as the treatments you want, and if you want to donate organs  What are the types of advance directives? There are many types of advance directives, and each state has rules about how to use them  You may choose a combination of any of the following:  · Living will: This is a written record of the treatment you want  You can also choose which treatments you do not want, which to limit, and which to stop at a certain time  This includes surgery, medicine, IV fluid, and tube feedings  · Durable power of  for healthcare [facility]): This is a written record that states who you want to make healthcare choices for you when you are unable to make them for yourself  This person, called a proxy, is usually a family member or a friend  You may choose more than 1 proxy  · Do not resuscitate (DNR) order:  A DNR order is used in case your heart stops beating or you stop breathing  It is a request not to have certain forms of treatment, such as CPR  A DNR order may be included in other types of advance directives  · Medical directive: This covers the care that you want if you are in a coma, near death, or unable to make decisions for yourself   You can list the treatments you want for each condition  Treatment may include pain medicine, surgery, blood transfusions, dialysis, IV or tube feedings, and a ventilator (breathing machine)  · Values history: This document has questions about your views, beliefs, and how you feel and think about life  This information can help others choose the care that you would choose  Why are advance directives important? An advance directive helps you control your care  Although spoken wishes may be used, it is better to have your wishes written down  Spoken wishes can be misunderstood, or not followed  Treatments may be given even if you do not want them  An advance directive may make it easier for your family to make difficult choices about your care  How do I decide what to put in my advance directives? · Make informed decisions:  Make sure you fully understand treatments or care you may receive  Think about the benefits and problems your decisions could cause for you or your family  Talk to healthcare providers if you have concerns or questions before you write down your wishes  You may also want to talk with your Jew or , or a   Check your state laws to make sure that what you put in your advance directive is legal      · Sign all forms:  Sign and date your advance directive when you have finished  You may also need 2 witnesses to sign the forms  Witnesses cannot be your doctor or his staff, your spouse, heirs or beneficiaries, people you owe money to, or your chosen proxy  Talk to your family, proxy, and healthcare providers about your advance directive  Give each person a copy, and keep one for yourself in a place you can get to easily  Do not keep it hidden or locked away  · Review and revise your plans: You can revise your advance directive at any time, as long as you are able to make decisions  Review your plan every year, and when there are changes in your life, or your health   When you make changes, let your family, proxy, and healthcare providers know  Give each a new copy  Where can I find more information? · [employer]  [address] , [place] [address]  Phone: 5- [phone] - [phone]  Phone: 4- [phone] - [phone]  Web Address: http://www  aafp org  · [address] [facility])  [address], [address]  Phone: 1- [phone] - [phone]  Phone: [phone]  Web Address: Neelima hagan  CARE AGREEMENT:   You have the right to help plan your care  To help with this plan, you must learn about your health condition and treatment options  You must also learn about advance directives and how they are used  Work with your healthcare providers to decide what care will be used to treat you  You always have the right to refuse treatment  The above information is an  only  It is not intended as medical advice for individual conditions or treatments  Talk to your doctor, nurse or pharmacist before following any medical regimen to see if it is safe and effective for you  © 2017 [address] [facility] Information is for End User's use only and may not be sold, redistributed or otherwise used for commercial purposes  All illustrations and images included in CareNotes® are the copyrighted property of [employer] M [employer]  or Khanh Carballo

## 2019-08-29 NOTE — PROGRESS NOTES
Assessment/Plan:  Return visit in 6 months  We will call regarding results of her stress test and blood work  Diagnoses and all orders for this visit:    Chest pain, unspecified type  -     Stress test only, exercise; Future    Medicare annual wellness visit, subsequent    Essential hypertension    Hypothyroidism, unspecified type  -     thyroid (ARMOUR THYROID) 90 MG tablet; Take 1 tablet (90 mg total) by mouth daily  -     TSH, 3rd generation with Free T4 reflex; Future  -     T3, uptake; Future  -     T4, free; Future    Gout, unspecified cause, unspecified chronicity, unspecified site  -     Uric acid; Future    Hyperlipidemia, unspecified hyperlipidemia type  -     Comprehensive metabolic panel; Future  -     Lipid panel; Future    Uncomplicated asthma, unspecified asthma severity, unspecified whether persistent    Prediabetes  -     Hemoglobin A1C; Future    Anemia, unspecified type  -     CBC and differential; Future        Hypertension  Continue losartan-hydrochlorothiazide 50-12 5 mg daily    Prediabetes  Low carb diet    Hyperlipidemia  Continue rosuvastatin 10 mg daily    Gout  Continue allopurinol 100 mg daily    Asthma  Continue Symbicort and Ventolin        Subjective:      Patient ID: Leatha Montero is a 69 y o  female  Patient comes in for a checkup  She had episode of crushing chest pain 3 weeks ago  She was seen in the emergency room and workup there was negative  The following portions of the patient's history were reviewed and updated as appropriate:   She has no past medical history on file  ,  does not have any pertinent problems on file  ,   has a past surgical history that includes Mastectomy; Hand surgery (Right); Breast biopsy; and Lumbar laminectomy  ,  family history includes Cancer in her father and mother; Diabetes in her mother; Heart disease in her mother  ,   reports that she has quit smoking  She has never used smokeless tobacco  She reports that she drinks alcohol   She reports that she does not use drugs  ,  is allergic to penicillins; amoxicillin; cefprozil; cephalexin; ciprofloxacin; doxycycline; erythromycin; and levofloxacin     Current Outpatient Medications   Medication Sig Dispense Refill    acetaminophen-codeine (TYLENOL #3) 300-30 mg per tablet Take by mouth      allopurinol (ZYLOPRIM) 100 mg tablet Take 2 tablets by mouth daily      colchicine (COLCRYS) 0 6 mg tablet Take by mouth      diazepam (VALIUM) 5 mg tablet Take by mouth      EPINEPHrine (EPIPEN 2-JESSICA) 0 3 mg/0 3 mL SOAJ Inject as directed      fluticasone (FLONASE) 50 mcg/act nasal spray into each nostril      ibuprofen (MOTRIN) 400 mg tablet TAKE ONE TABLET BY MOUTH THREE TIMES DAILY AS NEEDED  90 tablet 4    indomethacin (INDOCIN) 25 mg capsule Take 1 capsule (25 mg total) by mouth 2 (two) times a day with meals 30 capsule 0    losartan-hydrochlorothiazide (HYZAAR) 50-12 5 mg per tablet Take 1 tablet by mouth daily 90 tablet 3    rosuvastatin (CRESTOR) 10 MG tablet take 1 tablet by mouth once daily 30 tablet 3    SYMBICORT 160-4 5 MCG/ACT inhaler INHALE TWO PUFFS BY MOUTH TWICE DAILY  10 2 g 4    thyroid (ARMOUR THYROID) 90 MG tablet Take 1 tablet (90 mg total) by mouth daily 30 tablet 5    VENTOLIN  (90 Base) MCG/ACT inhaler        No current facility-administered medications for this visit  Review of Systems   Constitutional: Negative  Respiratory: Negative  Gastrointestinal: Negative  Objective:  Vitals:    08/29/19 1515   BP: 110/70   Pulse: 85   Resp: 16   Temp: 98 5 °F (36 9 °C)   SpO2: 97%   Weight: 65 8 kg (145 lb)   Height: 5' 4" (1 626 m)     Body mass index is 24 89 kg/m²  Physical Exam   Constitutional: She is oriented to person, place, and time  She appears well-developed  HENT:   Head: Normocephalic and atraumatic     Right Ear: Tympanic membrane and external ear normal    Left Ear: Tympanic membrane and external ear normal    Mouth/Throat: Oropharynx is clear and moist    Eyes: Pupils are equal, round, and reactive to light  EOM are normal    Neck: Neck supple  Cardiovascular: Normal rate, regular rhythm and normal heart sounds  Pulmonary/Chest: Effort normal and breath sounds normal    Abdominal: Soft  Musculoskeletal: Normal range of motion  Neurological: She is alert and oriented to person, place, and time  Skin: Skin is warm and dry  Psychiatric: She has a normal mood and affect

## 2019-08-29 NOTE — PROGRESS NOTES
Assessment and Plan:     Problem List Items Addressed This Visit     None      Visit Diagnoses     Medicare annual wellness visit, subsequent    -  Primary         History of Present Illness:     Patient presents for Medicare Annual Wellness visit    Patient Care Team:  Nino Alvarado MD as PCP - Deanna Lee MD     Problem List:     Patient Active Problem List   Diagnosis    Allergic rhinitis, seasonal    Anemia    Anxiety    Arthritis    Asthma    Breast cancer ([address] )    Neck pain    Hypertension    Fatty liver    Gout    Hyperlipidemia    Hypothyroidism    Hyperuricemia    Liver enlargement    Vision disturbance    Cough in adult    Nocturnal leg cramps    Prediabetes      Past Medical and Surgical History:     History reviewed  No pertinent past medical history    Past Surgical History:   Procedure Laterality Date    BREAST BIOPSY      HAND SURGERY Right     Thumb/Wrist Prosthesis    LUMBAR LAMINECTOMY      MASTECTOMY        Family History:     Family History   Problem Relation Age of Onset    Cancer Mother     Diabetes Mother     Heart disease Mother     Cancer Father       Social History:     Social History     Tobacco Use   Smoking Status Former Smoker   Smokeless Tobacco Never Used     Social History     Substance and Sexual Activity   Alcohol Use Yes     Social History     Substance and Sexual Activity   Drug Use No      Medications and Allergies:     Current Outpatient Medications   Medication Sig Dispense Refill    acetaminophen-codeine (TYLENOL #3) 300-30 mg per tablet Take by mouth      allopurinol (ZYLOPRIM) 100 mg tablet Take 2 tablets by mouth daily      ARMOUR THYROID 90 MG tablet TAKE 1 TABLET ONCE DAILY 30 tablet 4    colchicine (COLCRYS) 0 6 mg tablet Take by mouth      diazepam (VALIUM) 5 mg tablet Take by mouth      EPINEPHrine (EPIPEN 2-JESSICA) 0 3 mg/0 3 mL SOAJ Inject as directed      fluticasone (FLONASE) 50 mcg/act nasal spray into each nostril      ibuprofen (MOTRIN) 400 mg tablet TAKE ONE TABLET BY MOUTH THREE TIMES DAILY AS NEEDED  90 tablet 4    indomethacin (INDOCIN) 25 mg capsule Take 1 capsule (25 mg total) by mouth 2 (two) times a day with meals 30 capsule 0    losartan-hydrochlorothiazide (HYZAAR) 50-12 5 mg per tablet Take 1 tablet by mouth daily 90 tablet 3    promethazine-codeine (PHENERGAN WITH CODEINE) 6 25-10 mg/5 mL syrup Take 5 mL by mouth every 4 (four) hours as needed for cough 120 mL 0    rosuvastatin (CRESTOR) 10 MG tablet take 1 tablet by mouth once daily 30 tablet 3    SYMBICORT 160-4 5 MCG/ACT inhaler INHALE TWO PUFFS BY MOUTH TWICE DAILY  10 2 g 4    VENTOLIN  (90 Base) MCG/ACT inhaler        No current facility-administered medications for this visit  Allergies   Allergen Reactions    Penicillins     Amoxicillin Rash    Cefprozil Rash    Cephalexin Rash    Ciprofloxacin Rash    Doxycycline Rash    Erythromycin Rash    Levofloxacin Rash      Immunizations:     Immunization History   Administered Date(s) Administered    Pneumococcal Conjugate 13-Valent 04/02/2015    Pneumococcal Polysaccharide PPV23 09/16/2011, 10/21/2016    Tdap 11/17/2015    Zoster 06/13/2015      Medicare Screening Tests and Risk Assessments:     Lashanda Piedra is here for her Subsequent Wellness visit  Last Medicare Wellness visit information reviewed, patient interviewed and updates made to the record today  Health Risk Assessment:  Patient rates overall health as good  Patient feels that their physical health rating is Same  Eyesight was rated as Same  Hearing was rated as Same  Patient feels that their emotional and mental health rating is Same  Pain experienced by patient in the last 7 days has been None  Patient states that she has experienced no weight loss or gain in last 6 months  Emotional/Mental Health:  Patient has not been feeling nervous/anxious      PHQ-9 Depression Screening:    Frequency of the following problems over the past two weeks:      1  Little interest or pleasure in doing things: 0 - not at all      2  Feeling down, depressed, or hopeless: 0 - not at all  PHQ-2 Score: 0          Broken Bones/Falls: Fall Risk Assessment:    In the past year, patient has experienced: No history of falling in past year          Bladder/Bowel:  Patient has not leaked urine accidently in the last six months  Patient reports no loss of bowel control  Immunizations:  Patient has had a flu vaccination within the last year  Patient has received a pneumonia shot  Patient has received a shingles shot  Patient has received tetanus/diphtheria shot  Home Safety:  Patient does not have trouble with stairs inside or outside of their home  Patient currently reports that there are no safety hazards present in home, working smoke alarms, working carbon monoxide detectors  Preventative Screenings:   Breast cancer screening performed, colon cancer screen completed, cholesterol screen completed, glaucoma eye exam completed,     Nutrition:  Current diet: Regular with servings of the following:    Medications:  Patient is currently taking over-the-counter supplements  Patient is able to manage medications  Lifestyle Choices:  Patient reports no tobacco use  Patient has not smoked or used tobacco in the past   Patient reports no alcohol use  Patient drives a vehicle  Patient wears seat belt  Activities of Daily Living:  Can get out of bed by his or her self, able to dress self, able to make own meals, able to do own shopping, able to bathe self, can do own laundry/housekeeping, can manage own money, pay bills and track expenses    Previous Hospitalizations:  No hospitalization or ED visit in past 12 months        Advanced Directives:  Patient has decided on a power of   Patient has spoken to designated power of   Patient has not completed advanced directive          Preventative Screening/Counseling: Cardiovascular:      General: Screening Current          Diabetes:      General: Screening Current          Colorectal Cancer:      General: Screening Current          Breast Cancer:      General: Screening Current          Cervical Cancer:      General: Screening Not Indicated          Osteoporosis:      General: Risks and Benefits Discussed          AAA:      General: Screening Not Indicated          Glaucoma:      General: Screening Current          HIV:      General: Screening Not Indicated          Hepatitis C:      General: Screening Current        Advanced Directives:   Patient has no living will for healthcare, does not have durable POA for healthcare, patient does not have an advanced directive  Information on ACP and/or AD provided  5 wishes given  End of life assessment reviewed with patient  Provider agrees with end of life decisions

## 2019-08-30 ENCOUNTER — APPOINTMENT (OUTPATIENT)
Dept: LAB | Facility: HOSPITAL | Age: 74
End: 2019-08-30
Attending: FAMILY MEDICINE
Payer: MEDICARE

## 2019-08-30 DIAGNOSIS — R73.03 PREDIABETES: ICD-10-CM

## 2019-08-30 DIAGNOSIS — E78.5 HYPERLIPIDEMIA, UNSPECIFIED HYPERLIPIDEMIA TYPE: ICD-10-CM

## 2019-08-30 DIAGNOSIS — D64.9 ANEMIA, UNSPECIFIED TYPE: ICD-10-CM

## 2019-08-30 DIAGNOSIS — E03.9 HYPOTHYROIDISM, UNSPECIFIED TYPE: ICD-10-CM

## 2019-08-30 DIAGNOSIS — M10.9 GOUT, UNSPECIFIED CAUSE, UNSPECIFIED CHRONICITY, UNSPECIFIED SITE: ICD-10-CM

## 2019-08-30 LAB
ALBUMIN SERPL BCP-MCNC: 3.8 G/DL (ref 3.5–5)
ALP SERPL-CCNC: 78 U/L (ref 46–116)
ALT SERPL W P-5'-P-CCNC: 28 U/L (ref 12–78)
ANION GAP SERPL CALCULATED.3IONS-SCNC: 8 MMOL/L (ref 4–13)
AST SERPL W P-5'-P-CCNC: 22 U/L (ref 5–45)
BASOPHILS # BLD AUTO: 0.06 THOUSANDS/ΜL (ref 0–0.1)
BASOPHILS NFR BLD AUTO: 1 % (ref 0–1)
BILIRUB SERPL-MCNC: 0.5 MG/DL (ref 0.2–1)
BUN SERPL-MCNC: 19 MG/DL (ref 5–25)
CALCIUM SERPL-MCNC: 9 MG/DL (ref 8.3–10.1)
CHLORIDE SERPL-SCNC: 104 MMOL/L (ref 100–108)
CHOLEST SERPL-MCNC: 219 MG/DL (ref 50–200)
CO2 SERPL-SCNC: 30 MMOL/L (ref 21–32)
CREAT SERPL-MCNC: 0.83 MG/DL (ref 0.6–1.3)
EOSINOPHIL # BLD AUTO: 0.73 THOUSAND/ΜL (ref 0–0.61)
EOSINOPHIL NFR BLD AUTO: 12 % (ref 0–6)
ERYTHROCYTE [DISTWIDTH] IN BLOOD BY AUTOMATED COUNT: 13.1 % (ref 11.6–15.1)
EST. AVERAGE GLUCOSE BLD GHB EST-MCNC: 117 MG/DL
GFR SERPL CREATININE-BSD FRML MDRD: 71 ML/MIN/1.73SQ M
GLUCOSE P FAST SERPL-MCNC: 96 MG/DL (ref 65–99)
HBA1C MFR BLD: 5.7 % (ref 4.2–6.3)
HCT VFR BLD AUTO: 42.7 % (ref 34.8–46.1)
HDLC SERPL-MCNC: 74 MG/DL (ref 40–60)
HGB BLD-MCNC: 14.4 G/DL (ref 11.5–15.4)
IMM GRANULOCYTES # BLD AUTO: 0.01 THOUSAND/UL (ref 0–0.2)
IMM GRANULOCYTES NFR BLD AUTO: 0 % (ref 0–2)
LDLC SERPL CALC-MCNC: 128 MG/DL (ref 0–100)
LYMPHOCYTES # BLD AUTO: 2.21 THOUSANDS/ΜL (ref 0.6–4.47)
LYMPHOCYTES NFR BLD AUTO: 35 % (ref 14–44)
MCH RBC QN AUTO: 33.1 PG (ref 26.8–34.3)
MCHC RBC AUTO-ENTMCNC: 33.7 G/DL (ref 31.4–37.4)
MCV RBC AUTO: 98 FL (ref 82–98)
MONOCYTES # BLD AUTO: 0.41 THOUSAND/ΜL (ref 0.17–1.22)
MONOCYTES NFR BLD AUTO: 7 % (ref 4–12)
NEUTROPHILS # BLD AUTO: 2.92 THOUSANDS/ΜL (ref 1.85–7.62)
NEUTS SEG NFR BLD AUTO: 45 % (ref 43–75)
NONHDLC SERPL-MCNC: 145 MG/DL
NRBC BLD AUTO-RTO: 0 /100 WBCS
PLATELET # BLD AUTO: 236 THOUSANDS/UL (ref 149–390)
PMV BLD AUTO: 9.5 FL (ref 8.9–12.7)
POTASSIUM SERPL-SCNC: 3.9 MMOL/L (ref 3.5–5.3)
PROT SERPL-MCNC: 7.7 G/DL (ref 6.4–8.2)
RBC # BLD AUTO: 4.35 MILLION/UL (ref 3.81–5.12)
SODIUM SERPL-SCNC: 142 MMOL/L (ref 136–145)
T4 FREE SERPL-MCNC: 0.64 NG/DL (ref 0.76–1.46)
TRIGL SERPL-MCNC: 83 MG/DL
TSH SERPL DL<=0.05 MIU/L-ACNC: 0.36 UIU/ML (ref 0.36–3.74)
URATE SERPL-MCNC: 7.3 MG/DL (ref 2–6.8)
WBC # BLD AUTO: 6.34 THOUSAND/UL (ref 4.31–10.16)

## 2019-08-30 PROCEDURE — 84443 ASSAY THYROID STIM HORMONE: CPT

## 2019-08-30 PROCEDURE — 83036 HEMOGLOBIN GLYCOSYLATED A1C: CPT

## 2019-08-30 PROCEDURE — 80053 COMPREHEN METABOLIC PANEL: CPT

## 2019-08-30 PROCEDURE — 80061 LIPID PANEL: CPT

## 2019-08-30 PROCEDURE — 36415 COLL VENOUS BLD VENIPUNCTURE: CPT

## 2019-08-30 PROCEDURE — 84550 ASSAY OF BLOOD/URIC ACID: CPT

## 2019-08-30 PROCEDURE — 84479 ASSAY OF THYROID (T3 OR T4): CPT

## 2019-08-30 PROCEDURE — 85025 COMPLETE CBC W/AUTO DIFF WBC: CPT

## 2019-08-30 PROCEDURE — 84439 ASSAY OF FREE THYROXINE: CPT

## 2019-08-31 LAB — T3RU NFR SERPL: 24 % (ref 24–39)

## 2019-09-16 ENCOUNTER — HOSPITAL ENCOUNTER (OUTPATIENT)
Dept: NON INVASIVE DIAGNOSTICS | Facility: CLINIC | Age: 74
Discharge: HOME | End: 2019-09-16
Payer: MEDICARE

## 2019-09-16 DIAGNOSIS — R07.9 CHEST PAIN, UNSPECIFIED TYPE: ICD-10-CM

## 2019-09-16 DIAGNOSIS — R94.39 ABNORMAL STRESS TEST: Primary | ICD-10-CM

## 2019-09-16 LAB
ARRHY DURING EX: NORMAL
CHEST PAIN STATEMENT: NORMAL
MAX DIASTOLIC BP: 60 MMHG
MAX HEART RATE: 136 BPM
MAX PREDICTED HEART RATE: 148 BPM
MAX. SYSTOLIC BP: 142 MMHG
PROTOCOL NAME: NORMAL
REASON FOR TERMINATION: NORMAL
TARGET HR FORMULA: NORMAL
TEST INDICATION: NORMAL
TIME IN EXERCISE PHASE: NORMAL

## 2019-09-16 PROCEDURE — 93018 CV STRESS TEST I&R ONLY: CPT | Performed by: INTERNAL MEDICINE

## 2019-09-16 PROCEDURE — 93016 CV STRESS TEST SUPVJ ONLY: CPT | Performed by: INTERNAL MEDICINE

## 2019-09-16 PROCEDURE — 93017 CV STRESS TEST TRACING ONLY: CPT

## 2019-09-17 ENCOUNTER — HOSPITAL ENCOUNTER (OUTPATIENT)
Dept: NON INVASIVE DIAGNOSTICS | Facility: CLINIC | Age: 74
Discharge: HOME | End: 2019-09-17
Payer: MEDICARE

## 2019-09-17 ENCOUNTER — TELEPHONE (OUTPATIENT)
Dept: FAMILY MEDICINE CLINIC | Facility: CLINIC | Age: 74
End: 2019-09-17

## 2019-09-17 DIAGNOSIS — R94.39 ABNORMAL STRESS TEST: ICD-10-CM

## 2019-09-17 DIAGNOSIS — R94.39 ABNORMAL STRESS TEST: Primary | ICD-10-CM

## 2019-09-17 PROCEDURE — 93350 STRESS TTE ONLY: CPT

## 2019-09-17 NOTE — TELEPHONE ENCOUNTER
Patient has a beast cancer and implants, vascular cant get ultrasound done but can get a NM myocardial perfusion spect (stress and/or rest)  Please advise        Call back # [phone], Deedee Lester

## 2019-09-23 ENCOUNTER — HOSPITAL ENCOUNTER (OUTPATIENT)
Dept: NON INVASIVE DIAGNOSTICS | Facility: CLINIC | Age: 74
Discharge: HOME | End: 2019-09-23
Payer: MEDICARE

## 2019-09-23 DIAGNOSIS — R07.9 CHEST PAIN: ICD-10-CM

## 2019-09-23 DIAGNOSIS — R94.39 ABNORMAL STRESS TEST: ICD-10-CM

## 2019-09-23 PROCEDURE — 78452 HT MUSCLE IMAGE SPECT MULT: CPT

## 2019-09-23 PROCEDURE — 93018 CV STRESS TEST I&R ONLY: CPT | Performed by: INTERNAL MEDICINE

## 2019-09-23 PROCEDURE — 93016 CV STRESS TEST SUPVJ ONLY: CPT | Performed by: INTERNAL MEDICINE

## 2019-09-23 PROCEDURE — 93017 CV STRESS TEST TRACING ONLY: CPT

## 2019-09-23 PROCEDURE — A9502 TC99M TETROFOSMIN: HCPCS

## 2019-09-23 PROCEDURE — 78452 HT MUSCLE IMAGE SPECT MULT: CPT | Performed by: INTERNAL MEDICINE

## 2019-09-23 RX ORDER — AMINOPHYLLINE DIHYDRATE 25 MG/ML
50 INJECTION, SOLUTION INTRAVENOUS ONCE
Status: COMPLETED | OUTPATIENT
Start: 2019-09-23 | End: 2019-09-23

## 2019-09-23 RX ADMIN — REGADENOSON 0.4 MG: 0.08 INJECTION, SOLUTION INTRAVENOUS at 09:26

## 2019-09-23 RX ADMIN — AMINOPHYLLINE 50 MG: 25 INJECTION, SOLUTION INTRAVENOUS at 09:26

## 2019-09-24 LAB
MAX DIASTOLIC BP: 84 MMHG
MAX HEART RATE: 115 BPM
MAX PREDICTED HEART RATE: 148 BPM
MAX. SYSTOLIC BP: 116 MMHG
PROTOCOL NAME: NORMAL
REASON FOR TERMINATION: NORMAL
TARGET HR FORMULA: NORMAL
TIME IN EXERCISE PHASE: NORMAL

## 2019-10-25 ENCOUNTER — TELEPHONE (OUTPATIENT)
Dept: GASTROENTEROLOGY | Facility: CLINIC | Age: 74
End: 2019-10-25

## 2019-10-28 NOTE — TELEPHONE ENCOUNTER
10/28/19  Screened by: [facility]    Referring Provider     Pre- Screening: There is no height or weight on file to calculate BMI  Has patient been referred for a routine screening Colonoscopy? yes  Is the patient between 39-70 years old? yes    SCHEDULING STAFF   If the patient is between 45yrs-49yrs, please advise patient to confirm benefits/coverage with their insurance company for a routine screening colonoscopy, some insurance carriers will only cover at [address] or older   If the patient is over 66years old, please schedule an office visit  Do you have any of the following symptoms? NONE    Have you had a coronary or vascular stent within the last year? no    Have you had a heart attack or stroke in the last 6 months? no    Have you had intestinal surgery in the last 3 months? no    Do you have problems with: NONE    Do you use: NONE    Have you been hospitalized in the last Month? no    Have you been diagnosed with a bleeding disorder or anemia? no    Have you had chest pain (angina) or breathing problems  (COPD) in the last 3 months? yes HAD CARDIAC TESTING    Do you have any difficulty walking up a flight of stairs? no    Have you had Kidney failure or insufficiency? no    Have you had heart valve surgery? no    Are you confined to a wheelchair? no    Do you take Other blood thinning medications no    Have you been diagnosed with Diabetes or are you taking any   Diabetic medications? no    : If patient answers NO to medical questions, then schedule procedure  If patient answers YES to medical questions, then schedule office appointment  Previous Colonoscopy yes  Date and Facility of last colonoscopy?  BEING FAXED    Comments: 11/13/19

## 2019-11-11 DIAGNOSIS — E03.9 HYPOTHYROIDISM, UNSPECIFIED TYPE: ICD-10-CM

## 2019-11-11 RX ORDER — THYROID,PORK 90 MG
TABLET ORAL
Qty: 30 TABLET | Refills: 4 | Status: SHIPPED | OUTPATIENT
Start: 2019-11-11 | End: 2020-05-11

## 2019-11-13 ENCOUNTER — HOSPITAL ENCOUNTER (OUTPATIENT)
Dept: GASTROENTEROLOGY | Facility: HOSPITAL | Age: 74
Setting detail: OUTPATIENT SURGERY
Discharge: HOME | End: 2019-11-13
Attending: INTERNAL MEDICINE
Payer: MEDICARE

## 2019-11-13 ENCOUNTER — ANESTHESIA EVENT (OUTPATIENT)
Dept: GASTROENTEROLOGY | Facility: HOSPITAL | Age: 74
End: 2019-11-13

## 2019-11-13 ENCOUNTER — ANESTHESIA (OUTPATIENT)
Dept: GASTROENTEROLOGY | Facility: HOSPITAL | Age: 74
End: 2019-11-13

## 2019-11-13 VITALS
WEIGHT: 142.86 LBS | DIASTOLIC BLOOD PRESSURE: 79 MMHG | HEIGHT: 64 IN | BODY MASS INDEX: 24.39 KG/M2 | OXYGEN SATURATION: 97 % | SYSTOLIC BLOOD PRESSURE: 108 MMHG | HEART RATE: 77 BPM | TEMPERATURE: 98 F | RESPIRATION RATE: 13 BRPM

## 2019-11-13 DIAGNOSIS — Z12.11 SCREEN FOR COLON CANCER: ICD-10-CM

## 2019-11-13 PROCEDURE — G0121 COLON CA SCRN NOT HI RSK IND: HCPCS | Performed by: INTERNAL MEDICINE

## 2019-11-13 RX ORDER — SODIUM CHLORIDE, SODIUM LACTATE, POTASSIUM CHLORIDE, CALCIUM CHLORIDE 600; 310; 30; 20 MG/100ML; MG/100ML; MG/100ML; MG/100ML
125 INJECTION, SOLUTION INTRAVENOUS CONTINUOUS
Status: DISCONTINUED | OUTPATIENT
Start: 2019-11-13 | End: 2019-11-17 | Stop reason: HOSPADM

## 2019-11-13 RX ORDER — CETIRIZINE HYDROCHLORIDE 10 MG/1
10 TABLET ORAL DAILY
COMMUNITY

## 2019-11-13 RX ORDER — PROPOFOL 10 MG/ML
INJECTION, EMULSION INTRAVENOUS AS NEEDED
Status: DISCONTINUED | OUTPATIENT
Start: 2019-11-13 | End: 2019-11-13 | Stop reason: SURG

## 2019-11-13 RX ORDER — LIDOCAINE HYDROCHLORIDE 10 MG/ML
INJECTION, SOLUTION EPIDURAL; INFILTRATION; INTRACAUDAL; PERINEURAL AS NEEDED
Status: DISCONTINUED | OUTPATIENT
Start: 2019-11-13 | End: 2019-11-13 | Stop reason: SURG

## 2019-11-13 RX ADMIN — PROPOFOL 50 MG: 10 INJECTION, EMULSION INTRAVENOUS at 09:04

## 2019-11-13 RX ADMIN — PROPOFOL 100 MG: 10 INJECTION, EMULSION INTRAVENOUS at 09:00

## 2019-11-13 RX ADMIN — PROPOFOL 50 MG: 10 INJECTION, EMULSION INTRAVENOUS at 09:08

## 2019-11-13 RX ADMIN — LIDOCAINE HYDROCHLORIDE 50 MG: 10 INJECTION, SOLUTION EPIDURAL; INFILTRATION; INTRACAUDAL; PERINEURAL at 09:00

## 2019-11-13 RX ADMIN — SODIUM CHLORIDE, SODIUM LACTATE, POTASSIUM CHLORIDE, AND CALCIUM CHLORIDE: .6; .31; .03; .02 INJECTION, SOLUTION INTRAVENOUS at 08:44

## 2019-11-13 NOTE — ANESTHESIA POSTPROCEDURE EVALUATION
Post-Op Assessment Note    CV Status:  Stable  Pain Score: 0    Pain management: adequate     Mental Status:  Sleepy and awake   Hydration Status:  Stable   PONV Controlled:  None   Airway Patency:  Patent   Post Op Vitals Reviewed: Yes      Staff: Anesthesiologist, CRNA           /77 (11/13/19 0916)    Temp      Pulse 76 (11/13/19 0916)   Resp 13 (11/13/19 0916)    SpO2 98 % (11/13/19 0916)

## 2019-11-13 NOTE — DISCHARGE INSTRUCTIONS
Colonoscopy   WHAT YOU NEED TO KNOW:   A colonoscopy is a procedure to examine the inside of your colon (intestine) with a scope  Polyps or tissue growths may have been removed during your colonoscopy  It is normal to feel bloated and to have some abdominal discomfort  You should be passing gas  If you have hemorrhoids or you had polyps removed, you may have a small amount of bleeding  DISCHARGE INSTRUCTIONS:   Seek care immediately if:   · You have a large amount of bright red blood in your bowel movements  · Your abdomen is hard and firm and you have severe pain  · You have sudden trouble breathing  Contact your healthcare provider if:   · You develop a rash or hives  · You have a fever within 24 hours of your procedure  · You have not had a bowel movement for 3 days after your procedure  · You have questions or concerns about your condition or care  Activity:   · Do not lift, strain, or run  for 3 days after your procedure  · Rest after your procedure  You have been given medicine to relax you  Do not  drive or make important decisions until the day after your procedure  Return to your normal activity as directed  · Relieve gas and discomfort from bloating  by lying on your right side with a heating pad on your abdomen  You may need to take short walks to help the gas move out  Eat small meals until bloating is relieved  If you had polyps removed: For 7 days after your procedure:  · Do not  take aspirin  · Do not  go on long car rides  Help prevent constipation:   · Eat a variety of healthy foods  Healthy foods include fruit, vegetables, whole-grain breads, low-fat dairy products, beans, lean meat, and fish  Ask if you need to be on a special diet  Your healthcare provider may recommend that you eat high-fiber foods such as cooked beans  Fiber helps you have regular bowel movements  · Drink liquids as directed    Adults should drink between 9 and 13 eight-ounce cups of liquid every day  Ask what amount is best for you  For most people, good liquids to drink are water, juice, and milk  · Exercise as directed  Talk to your healthcare provider about the best exercise plan for you  Exercise can help prevent constipation, decrease your blood pressure and improve your health  Follow up with your healthcare provider as directed:  Write down your questions so you remember to ask them during your visits  © 2017 [employer] Information is for End User's use only and may not be sold, redistributed or otherwise used for commercial purposes  All illustrations and images included in CareNotes® are the copyrighted property of [employer]  or [employer]  The above information is an  only  It is not intended as medical advice for individual conditions or treatments  Talk to your doctor, nurse or pharmacist before following any medical regimen to see if it is safe and effective for you

## 2019-11-13 NOTE — ANESTHESIA PREPROCEDURE EVALUATION
Review of Systems/Medical History  Patient summary reviewed  Chart reviewed  No history of anesthetic complications     Cardiovascular  EKG reviewed, Exercise tolerance (METS): >4,  Hyperlipidemia, Hypertension controlled,    Pulmonary  Asthma , well controlled/ stable ,        GI/Hepatic            Endo/Other  History of thyroid disease , hypothyroidism,      GYN       Hematology   Musculoskeletal    Arthritis     Neurology   Psychology   Anxiety,              Physical Exam    Airway    Mallampati score: I  TM Distance: >3 FB  Neck ROM: full     Dental   No notable dental hx     Cardiovascular      Pulmonary      Other Findings        Anesthesia Plan  ASA Score- 2     Anesthesia Type- IV sedation with anesthesia with ASA Monitors  Additional Monitors:   Airway Plan:         Plan Factors-    Induction- intravenous  Postoperative Plan-     Informed Consent- Anesthetic plan and risks discussed with patient  I personally reviewed this patient with the CRNA  Discussed and agreed on the Anesthesia Plan with the CRNA  Anuradha Graf

## 2019-11-13 NOTE — H&P
History and Physical -  [facility]  Natalee Jimenez 69 y o  female MRN: [id number]                  HPI: Natalee Jimenez is a 69y o  year old female who presents for colonoscopy for history of colon polyps  Last colonoscopy 5 years ago      REVIEW OF SYSTEMS: Per the HPI, and otherwise unremarkable  Historical Information   History reviewed  No pertinent past medical history  Past Surgical History:   Procedure Laterality Date    BREAST BIOPSY      HAND SURGERY Right     Thumb/Wrist Prosthesis    LUMBAR LAMINECTOMY      MASTECTOMY       Social History   Social History     Substance and Sexual Activity   Alcohol Use Yes     Social History     Substance and Sexual Activity   Drug Use No     Social History     Tobacco Use   Smoking Status Former Smoker   Smokeless Tobacco Never Used     Family History   Problem Relation Age of Onset    Cancer Mother     Diabetes Mother     Heart disease Mother     Cancer Father        Meds/Allergies       (Not in a hospital admission)    Allergies   Allergen Reactions    Penicillins     Amoxicillin Rash    Cefprozil Rash    Cephalexin Rash    Ciprofloxacin Rash    Doxycycline Rash    Erythromycin Rash    Levofloxacin Rash       Objective     There were no vitals taken for this visit        PHYSICAL EXAM    Gen: NAD  CV: RRR  CHEST: Clear  ABD: soft, NT/ND  EXT: no edema  Neuro: AAO      ASSESSMENT/PLAN:  This is a 69y o  year old female here for history of colon polyps    PLAN:   Procedure:  Colonoscopy

## 2019-12-05 DIAGNOSIS — I10 ESSENTIAL HYPERTENSION: ICD-10-CM

## 2019-12-06 RX ORDER — LOSARTAN POTASSIUM AND HYDROCHLOROTHIAZIDE 12.5; 5 MG/1; MG/1
TABLET ORAL
Qty: 90 TABLET | Refills: 2 | Status: SHIPPED | OUTPATIENT
Start: 2019-12-06 | End: 2020-03-06

## 2019-12-10 ENCOUNTER — OFFICE VISIT (OUTPATIENT)
Dept: FAMILY MEDICINE CLINIC | Facility: CLINIC | Age: 74
End: 2019-12-10
Payer: MEDICARE

## 2019-12-10 VITALS
SYSTOLIC BLOOD PRESSURE: 116 MMHG | RESPIRATION RATE: 16 BRPM | WEIGHT: 145 LBS | TEMPERATURE: 98.5 F | OXYGEN SATURATION: 96 % | HEIGHT: 64 IN | BODY MASS INDEX: 24.75 KG/M2 | HEART RATE: 83 BPM | DIASTOLIC BLOOD PRESSURE: 70 MMHG

## 2019-12-10 DIAGNOSIS — E03.9 HYPOTHYROIDISM, UNSPECIFIED TYPE: ICD-10-CM

## 2019-12-10 DIAGNOSIS — R73.03 PREDIABETES: ICD-10-CM

## 2019-12-10 DIAGNOSIS — Z01.818 PREOPERATIVE EXAMINATION: Primary | ICD-10-CM

## 2019-12-10 DIAGNOSIS — E78.5 HYPERLIPIDEMIA, UNSPECIFIED HYPERLIPIDEMIA TYPE: ICD-10-CM

## 2019-12-10 DIAGNOSIS — H26.9 CATARACT, UNSPECIFIED CATARACT TYPE, UNSPECIFIED LATERALITY: ICD-10-CM

## 2019-12-10 DIAGNOSIS — I10 ESSENTIAL HYPERTENSION: ICD-10-CM

## 2019-12-10 DIAGNOSIS — J45.909 UNCOMPLICATED ASTHMA, UNSPECIFIED ASTHMA SEVERITY, UNSPECIFIED WHETHER PERSISTENT: ICD-10-CM

## 2019-12-10 DIAGNOSIS — J30.2 SEASONAL ALLERGIC RHINITIS, UNSPECIFIED TRIGGER: ICD-10-CM

## 2019-12-10 PROCEDURE — 99214 OFFICE O/P EST MOD 30 MIN: CPT | Performed by: FAMILY MEDICINE

## 2019-12-10 NOTE — PROGRESS NOTES
Assessment/Plan:  Patient is cleared for surgery  She will return here in 6 months with fasting blood work prior to visit     Diagnoses and all orders for this visit:    Preoperative examination    Cataract, unspecified cataract type, unspecified laterality    Essential hypertension    Uncomplicated asthma, unspecified asthma severity, unspecified whether persistent    Seasonal allergic rhinitis, unspecified trigger    Hyperlipidemia, unspecified hyperlipidemia type  -     CBC and differential; Future  -     Comprehensive metabolic panel; Future  -     Lipid panel; Future    Prediabetes  -     Hemoglobin A1C; Future    Hypothyroidism, unspecified type  -     TSH, 3rd generation with Free T4 reflex; Future  -     T3, uptake; Future  -     T4, free; Future        Hypothyroidism  Continue [place] Thyroid 90 mg daily    Asthma  Continue Symbicort and Ventolin as needed    Hypertension  Continue losartan-hydrochlorothiazide 50-12 5 mg daily    Prediabetes  Low carb diet    Hyperlipidemia  Continue red yeast rice        Subjective:      Patient ID: Tamir Saba is a 69 y o  female  Patient comes in for preoperative clearance for cataract surgery  The following portions of the patient's history were reviewed and updated as appropriate:   She has a past medical history of Asthma and Disease of thyroid gland  ,  does not have any pertinent problems on file  ,   has a past surgical history that includes Mastectomy; Hand surgery (Right); Breast biopsy; Lumbar laminectomy; and Neck surgery  ,  family history includes Cancer in her father and mother; Diabetes in her mother; Heart disease in her mother  ,   reports that she quit smoking about 33 years ago  She has never used smokeless tobacco  She reports that she drinks alcohol  She reports that she has current or past drug history  Drug: Marijuana  ,  is allergic to penicillins; amoxicillin; cefprozil; cephalexin; ciprofloxacin; doxycycline; erythromycin; and levofloxacin     Current Outpatient Medications   Medication Sig Dispense Refill    acetaminophen-codeine (TYLENOL #3) 300-30 mg per tablet Take by mouth      ARMOUR THYROID 90 MG tablet TAKE 1 TABLET ONCE DAILY 30 tablet 4    cetirizine (ZyrTEC) 10 mg tablet Take 10 mg by mouth daily      EPINEPHrine (EPIPEN 2-JESSICA) 0 3 mg/0 3 mL SOAJ Inject as directed      fluticasone (FLONASE) 50 mcg/act nasal spray into each nostril      ibuprofen (MOTRIN) 400 mg tablet TAKE ONE TABLET BY MOUTH THREE TIMES DAILY AS NEEDED  90 tablet 4    losartan-hydrochlorothiazide (HYZAAR) 50-12 5 mg per tablet TAKE ONE TABLET BY MOUTH EVERY DAY  90 tablet 2    SYMBICORT 160-4 5 MCG/ACT inhaler INHALE TWO PUFFS BY MOUTH TWICE DAILY  10 2 g 4    VENTOLIN  (90 Base) MCG/ACT inhaler        No current facility-administered medications for this visit  Review of Systems   Constitutional: Negative  HENT: Negative  Eyes: Positive for visual disturbance  Respiratory: Negative  Cardiovascular: Negative  Endocrine: Negative  Hematological: Negative  Objective:  Vitals:    12/10/19 1722   BP: 116/70   Pulse: 83   Resp: 16   Temp: 98 5 °F (36 9 °C)   SpO2: 96%   Weight: 65 8 kg (145 lb)   Height: 5' 4" (1 626 m)     Body mass index is 24 89 kg/m²  Physical Exam   Constitutional: She is oriented to person, place, and time  She appears well-developed  HENT:   Head: Normocephalic and atraumatic  Right Ear: Tympanic membrane and external ear normal    Left Ear: Tympanic membrane and external ear normal    Mouth/Throat: Oropharynx is clear and moist    Eyes: Pupils are equal, round, and reactive to light  EOM are normal    Neck: Neck supple  No thyromegaly present  Cardiovascular: Normal rate, regular rhythm and normal heart sounds  Pulmonary/Chest: Effort normal and breath sounds normal    Abdominal: Soft  Musculoskeletal: Normal range of motion     Lymphadenopathy:     She has no cervical adenopathy  Neurological: She is alert and oriented to person, place, and time  Skin: Skin is warm and dry  Psychiatric: She has a normal mood and affect

## 2019-12-10 NOTE — LETTER
December 10, 2019     Indiana Camp MD  [address] [facility] [address]    Patient: Brett Taylor   YOB: 1945   Date of Visit: 12/10/2019       Dear Dr Jayson Melendez: Thank you for referring Erasto Rodriguez to me for evaluation  Below are my notes for this consultation  If you have questions, please do not hesitate to call me  I look forward to following your patient along with you  Patient is cleared for the proposed surgery         Sincerely,        Lani Ma MD        CC: No Recipients  Lani Ma MD  12/10/2019  5:47 PM  Incomplete  Assessment/Plan:  Patient is cleared for surgery  She will return here in 6 months with fasting blood work prior to visit     Diagnoses and all orders for this visit:    Preoperative examination    Cataract, unspecified cataract type, unspecified laterality    Essential hypertension    Uncomplicated asthma, unspecified asthma severity, unspecified whether persistent    Seasonal allergic rhinitis, unspecified trigger    Hyperlipidemia, unspecified hyperlipidemia type  -     CBC and differential; Future  -     Comprehensive metabolic panel; Future  -     Lipid panel; Future    Prediabetes  -     Hemoglobin A1C; Future    Hypothyroidism, unspecified type  -     TSH, 3rd generation with Free T4 reflex; Future  -     T3, uptake; Future  -     T4, free; Future        No problem-specific Assessment & Plan notes found for this encounter  Subjective:      Patient ID: Brett Taylor is a 69 y o  female  Patient comes in for preoperative clearance for cataract surgery  The following portions of the patient's history were reviewed and updated as appropriate:   She has a past medical history of Asthma and Disease of thyroid gland  ,  does not have any pertinent problems on file  ,   has a past surgical history that includes Mastectomy; Hand surgery (Right); Breast biopsy; Lumbar laminectomy; and Neck surgery  ,  family history includes Cancer in her father and mother; Diabetes in her mother; Heart disease in her mother  ,   reports that she quit smoking about 33 years ago  She has never used smokeless tobacco  She reports that she drinks alcohol  She reports that she has current or past drug history  Drug: Marijuana  ,  is allergic to penicillins; amoxicillin; cefprozil; cephalexin; ciprofloxacin; doxycycline; erythromycin; and levofloxacin     Current Outpatient Medications   Medication Sig Dispense Refill    acetaminophen-codeine (TYLENOL #3) 300-30 mg per tablet Take by mouth      ARMOUR THYROID 90 MG tablet TAKE 1 TABLET ONCE DAILY 30 tablet 4    cetirizine (ZyrTEC) 10 mg tablet Take 10 mg by mouth daily      EPINEPHrine (EPIPEN 2-JESSICA) 0 3 mg/0 3 mL SOAJ Inject as directed      fluticasone (FLONASE) 50 mcg/act nasal spray into each nostril      ibuprofen (MOTRIN) 400 mg tablet TAKE ONE TABLET BY MOUTH THREE TIMES DAILY AS NEEDED  90 tablet 4    losartan-hydrochlorothiazide (HYZAAR) 50-12 5 mg per tablet TAKE ONE TABLET BY MOUTH EVERY DAY  90 tablet 2    SYMBICORT 160-4 5 MCG/ACT inhaler INHALE TWO PUFFS BY MOUTH TWICE DAILY  10 2 g 4    VENTOLIN  (90 Base) MCG/ACT inhaler        No current facility-administered medications for this visit  Review of Systems   Constitutional: Negative  HENT: Negative  Eyes: Positive for visual disturbance  Respiratory: Negative  Cardiovascular: Negative  Endocrine: Negative  Hematological: Negative  Objective:  Vitals:    12/10/19 1722   BP: 116/70   Pulse: 83   Resp: 16   Temp: 98 5 °F (36 9 °C)   SpO2: 96%   Weight: 65 8 kg (145 lb)   Height: 5' 4" (1 626 m)     Body mass index is 24 89 kg/m²  Physical Exam   Constitutional: She is oriented to person, place, and time  She appears well-developed  HENT:   Head: Normocephalic and atraumatic     Right Ear: Tympanic membrane and external ear normal    Left Ear: Tympanic membrane and external ear normal  Mouth/Throat: Oropharynx is clear and moist    Eyes: Pupils are equal, round, and reactive to light  EOM are normal    Neck: Neck supple  No thyromegaly present  Cardiovascular: Normal rate, regular rhythm and normal heart sounds  Pulmonary/Chest: Effort normal and breath sounds normal    Abdominal: Soft  Musculoskeletal: Normal range of motion  Lymphadenopathy:     She has no cervical adenopathy  Neurological: She is alert and oriented to person, place, and time  Skin: Skin is warm and dry  Psychiatric: She has a normal mood and affect  Emily Gilmore MD  12/10/2019  5:41 PM  Sign at close encounter  Assessment/Plan:       There are no diagnoses linked to this encounter  No problem-specific Assessment & Plan notes found for this encounter  Subjective:      Patient ID: Kath Funes is a 69 y o  female  HPI    The following portions of the patient's history were reviewed and updated as appropriate:   She has a past medical history of Asthma and Disease of thyroid gland  ,  does not have any pertinent problems on file  ,   has a past surgical history that includes Mastectomy; Hand surgery (Right); Breast biopsy; Lumbar laminectomy; and Neck surgery  ,  family history includes Cancer in her father and mother; Diabetes in her mother; Heart disease in her mother  ,   reports that she quit smoking about 33 years ago  She has never used smokeless tobacco  She reports that she drinks alcohol  She reports that she has current or past drug history  Drug: Marijuana  ,  is allergic to penicillins; amoxicillin; cefprozil; cephalexin; ciprofloxacin; doxycycline; erythromycin; and levofloxacin     Current Outpatient Medications   Medication Sig Dispense Refill    acetaminophen-codeine (TYLENOL #3) 300-30 mg per tablet Take by mouth      ARMOUR THYROID 90 MG tablet TAKE 1 TABLET ONCE DAILY 30 tablet 4    cetirizine (ZyrTEC) 10 mg tablet Take 10 mg by mouth daily      EPINEPHrine (EPIPEN 2-JESSICA) 0 3 mg/0 3 mL SOAJ Inject as directed      fluticasone (FLONASE) 50 mcg/act nasal spray into each nostril      ibuprofen (MOTRIN) 400 mg tablet TAKE ONE TABLET BY MOUTH THREE TIMES DAILY AS NEEDED  90 tablet 4    losartan-hydrochlorothiazide (HYZAAR) 50-12 5 mg per tablet TAKE ONE TABLET BY MOUTH EVERY DAY  90 tablet 2    SYMBICORT 160-4 5 MCG/ACT inhaler INHALE TWO PUFFS BY MOUTH TWICE DAILY  10 2 g 4    VENTOLIN  (90 Base) MCG/ACT inhaler        No current facility-administered medications for this visit  Review of Systems      Objective:  Vitals:    12/10/19 1722   BP: 116/70   Pulse: 83   Resp: 16   Temp: 98 5 °F (36 9 °C)   SpO2: 96%   Weight: 65 8 kg (145 lb)   Height: 5' 4" (1 626 m)     Body mass index is 24 89 kg/m²       Physical Exam

## 2020-01-09 DIAGNOSIS — J45.909 UNCOMPLICATED ASTHMA, UNSPECIFIED ASTHMA SEVERITY, UNSPECIFIED WHETHER PERSISTENT: ICD-10-CM

## 2020-01-09 RX ORDER — BUDESONIDE AND FORMOTEROL FUMARATE DIHYDRATE 160; 4.5 UG/1; UG/1
AEROSOL RESPIRATORY (INHALATION)
Qty: 10.2 G | Refills: 0 | Status: SHIPPED | OUTPATIENT
Start: 2020-01-09 | End: 2020-02-15

## 2020-02-14 DIAGNOSIS — J45.909 UNCOMPLICATED ASTHMA, UNSPECIFIED ASTHMA SEVERITY, UNSPECIFIED WHETHER PERSISTENT: ICD-10-CM

## 2020-02-15 RX ORDER — BUDESONIDE AND FORMOTEROL FUMARATE DIHYDRATE 160; 4.5 UG/1; UG/1
AEROSOL RESPIRATORY (INHALATION)
Qty: 10.2 G | Refills: 0 | Status: SHIPPED | OUTPATIENT
Start: 2020-02-15 | End: 2020-04-14

## 2020-03-06 DIAGNOSIS — I10 ESSENTIAL HYPERTENSION: Primary | ICD-10-CM

## 2020-03-06 RX ORDER — HYDROCHLOROTHIAZIDE 12.5 MG/1
12.5 TABLET ORAL DAILY
Qty: 90 TABLET | Refills: 0 | Status: SHIPPED | OUTPATIENT
Start: 2020-03-06 | End: 2020-06-01

## 2020-03-06 RX ORDER — LOSARTAN POTASSIUM 50 MG/1
50 TABLET ORAL DAILY
Qty: 90 TABLET | Refills: 0 | Status: SHIPPED | OUTPATIENT
Start: 2020-03-06 | End: 2020-06-01

## 2020-03-20 DIAGNOSIS — M19.90 ARTHRITIS: ICD-10-CM

## 2020-03-20 RX ORDER — IBUPROFEN 400 MG/1
TABLET ORAL
Qty: 90 TABLET | Refills: 0 | Status: SHIPPED | OUTPATIENT
Start: 2020-03-20 | End: 2020-08-18

## 2020-04-03 ENCOUNTER — TELEPHONE (OUTPATIENT)
Dept: FAMILY MEDICINE CLINIC | Facility: CLINIC | Age: 75
End: 2020-04-03

## 2020-04-14 DIAGNOSIS — J45.909 UNCOMPLICATED ASTHMA, UNSPECIFIED ASTHMA SEVERITY, UNSPECIFIED WHETHER PERSISTENT: ICD-10-CM

## 2020-04-14 RX ORDER — BUDESONIDE AND FORMOTEROL FUMARATE DIHYDRATE 160; 4.5 UG/1; UG/1
AEROSOL RESPIRATORY (INHALATION)
Qty: 10.2 G | Refills: 0 | Status: SHIPPED | OUTPATIENT
Start: 2020-04-14 | End: 2020-08-22

## 2020-05-09 DIAGNOSIS — E03.9 HYPOTHYROIDISM, UNSPECIFIED TYPE: ICD-10-CM

## 2020-05-11 RX ORDER — THYROID,PORK 90 MG
TABLET ORAL
Qty: 30 TABLET | Refills: 0 | Status: SHIPPED | OUTPATIENT
Start: 2020-05-11 | End: 2020-07-13

## 2020-05-30 DIAGNOSIS — I10 ESSENTIAL HYPERTENSION: ICD-10-CM

## 2020-06-01 ENCOUNTER — APPOINTMENT (OUTPATIENT)
Dept: LAB | Facility: HOSPITAL | Age: 75
End: 2020-06-01
Attending: FAMILY MEDICINE
Payer: MEDICARE

## 2020-06-01 DIAGNOSIS — E78.5 HYPERLIPIDEMIA, UNSPECIFIED HYPERLIPIDEMIA TYPE: ICD-10-CM

## 2020-06-01 DIAGNOSIS — E03.9 HYPOTHYROIDISM, UNSPECIFIED TYPE: ICD-10-CM

## 2020-06-01 DIAGNOSIS — R73.03 PREDIABETES: ICD-10-CM

## 2020-06-01 DIAGNOSIS — I10 ESSENTIAL HYPERTENSION: ICD-10-CM

## 2020-06-01 LAB
ALBUMIN SERPL BCP-MCNC: 3.9 G/DL (ref 3.5–5)
ALP SERPL-CCNC: 70 U/L (ref 46–116)
ALT SERPL W P-5'-P-CCNC: 27 U/L (ref 12–78)
ANION GAP SERPL CALCULATED.3IONS-SCNC: 10 MMOL/L (ref 4–13)
AST SERPL W P-5'-P-CCNC: 21 U/L (ref 5–45)
BASOPHILS # BLD AUTO: 0.06 THOUSANDS/ΜL (ref 0–0.1)
BASOPHILS NFR BLD AUTO: 1 % (ref 0–1)
BILIRUB SERPL-MCNC: 0.9 MG/DL (ref 0.2–1)
BUN SERPL-MCNC: 19 MG/DL (ref 5–25)
CALCIUM SERPL-MCNC: 8.9 MG/DL (ref 8.3–10.1)
CHLORIDE SERPL-SCNC: 106 MMOL/L (ref 100–108)
CHOLEST SERPL-MCNC: 244 MG/DL (ref 50–200)
CO2 SERPL-SCNC: 29 MMOL/L (ref 21–32)
CREAT SERPL-MCNC: 0.79 MG/DL (ref 0.6–1.3)
EOSINOPHIL # BLD AUTO: 0.57 THOUSAND/ΜL (ref 0–0.61)
EOSINOPHIL NFR BLD AUTO: 10 % (ref 0–6)
ERYTHROCYTE [DISTWIDTH] IN BLOOD BY AUTOMATED COUNT: 14.8 % (ref 11.6–15.1)
EST. AVERAGE GLUCOSE BLD GHB EST-MCNC: 114 MG/DL
GFR SERPL CREATININE-BSD FRML MDRD: 74 ML/MIN/1.73SQ M
GLUCOSE P FAST SERPL-MCNC: 98 MG/DL (ref 65–99)
HBA1C MFR BLD: 5.6 %
HCT VFR BLD AUTO: 43.1 % (ref 34.8–46.1)
HDLC SERPL-MCNC: 100 MG/DL
HGB BLD-MCNC: 14.2 G/DL (ref 11.5–15.4)
IMM GRANULOCYTES # BLD AUTO: 0.01 THOUSAND/UL (ref 0–0.2)
IMM GRANULOCYTES NFR BLD AUTO: 0 % (ref 0–2)
LDLC SERPL CALC-MCNC: 122 MG/DL (ref 0–100)
LYMPHOCYTES # BLD AUTO: 1.85 THOUSANDS/ΜL (ref 0.6–4.47)
LYMPHOCYTES NFR BLD AUTO: 31 % (ref 14–44)
MCH RBC QN AUTO: 32.5 PG (ref 26.8–34.3)
MCHC RBC AUTO-ENTMCNC: 32.9 G/DL (ref 31.4–37.4)
MCV RBC AUTO: 99 FL (ref 82–98)
MONOCYTES # BLD AUTO: 0.42 THOUSAND/ΜL (ref 0.17–1.22)
MONOCYTES NFR BLD AUTO: 7 % (ref 4–12)
NEUTROPHILS # BLD AUTO: 3.08 THOUSANDS/ΜL (ref 1.85–7.62)
NEUTS SEG NFR BLD AUTO: 51 % (ref 43–75)
NONHDLC SERPL-MCNC: 144 MG/DL
NRBC BLD AUTO-RTO: 0 /100 WBCS
PLATELET # BLD AUTO: 207 THOUSANDS/UL (ref 149–390)
PMV BLD AUTO: 9.6 FL (ref 8.9–12.7)
POTASSIUM SERPL-SCNC: 4.1 MMOL/L (ref 3.5–5.3)
PROT SERPL-MCNC: 7.7 G/DL (ref 6.4–8.2)
RBC # BLD AUTO: 4.37 MILLION/UL (ref 3.81–5.12)
SODIUM SERPL-SCNC: 145 MMOL/L (ref 136–145)
T4 FREE SERPL-MCNC: 0.65 NG/DL (ref 0.76–1.46)
TRIGL SERPL-MCNC: 111 MG/DL
TSH SERPL DL<=0.05 MIU/L-ACNC: 0.98 UIU/ML (ref 0.36–3.74)
WBC # BLD AUTO: 5.99 THOUSAND/UL (ref 4.31–10.16)

## 2020-06-01 PROCEDURE — 85025 COMPLETE CBC W/AUTO DIFF WBC: CPT

## 2020-06-01 PROCEDURE — 83036 HEMOGLOBIN GLYCOSYLATED A1C: CPT

## 2020-06-01 PROCEDURE — 80053 COMPREHEN METABOLIC PANEL: CPT

## 2020-06-01 PROCEDURE — 80061 LIPID PANEL: CPT

## 2020-06-01 PROCEDURE — 84479 ASSAY OF THYROID (T3 OR T4): CPT

## 2020-06-01 PROCEDURE — 36415 COLL VENOUS BLD VENIPUNCTURE: CPT

## 2020-06-01 PROCEDURE — 84439 ASSAY OF FREE THYROXINE: CPT

## 2020-06-01 PROCEDURE — 84443 ASSAY THYROID STIM HORMONE: CPT

## 2020-06-01 RX ORDER — LOSARTAN POTASSIUM 50 MG/1
50 TABLET ORAL DAILY
Qty: 90 TABLET | Refills: 0 | Status: SHIPPED | OUTPATIENT
Start: 2020-06-01 | End: 2020-09-15 | Stop reason: SDUPTHER

## 2020-06-01 RX ORDER — LOSARTAN POTASSIUM 50 MG/1
TABLET ORAL
Qty: 90 TABLET | Refills: 0 | Status: SHIPPED | OUTPATIENT
Start: 2020-06-01 | End: 2020-06-18

## 2020-06-01 RX ORDER — HYDROCHLOROTHIAZIDE 12.5 MG/1
TABLET ORAL
Qty: 90 TABLET | Refills: 0 | Status: SHIPPED | OUTPATIENT
Start: 2020-06-01 | End: 2020-09-15 | Stop reason: SDUPTHER

## 2020-06-01 RX ORDER — HYDROCHLOROTHIAZIDE 12.5 MG/1
12.5 TABLET ORAL DAILY
Qty: 90 TABLET | Refills: 0 | Status: SHIPPED | OUTPATIENT
Start: 2020-06-01 | End: 2021-02-25 | Stop reason: SDUPTHER

## 2020-06-02 LAB — T3RU NFR SERPL: 23 % (ref 24–39)

## 2020-06-18 ENCOUNTER — OFFICE VISIT (OUTPATIENT)
Dept: FAMILY MEDICINE CLINIC | Facility: CLINIC | Age: 75
End: 2020-06-18
Payer: MEDICARE

## 2020-06-18 VITALS
HEART RATE: 80 BPM | WEIGHT: 150 LBS | HEIGHT: 64 IN | SYSTOLIC BLOOD PRESSURE: 124 MMHG | BODY MASS INDEX: 25.61 KG/M2 | OXYGEN SATURATION: 96 % | DIASTOLIC BLOOD PRESSURE: 70 MMHG | TEMPERATURE: 96.3 F

## 2020-06-18 DIAGNOSIS — T78.40XA ALLERGIC REACTION, INITIAL ENCOUNTER: Primary | ICD-10-CM

## 2020-06-18 DIAGNOSIS — E03.9 HYPOTHYROIDISM, UNSPECIFIED TYPE: ICD-10-CM

## 2020-06-18 DIAGNOSIS — E78.5 HYPERLIPIDEMIA, UNSPECIFIED HYPERLIPIDEMIA TYPE: ICD-10-CM

## 2020-06-18 DIAGNOSIS — I10 ESSENTIAL HYPERTENSION: ICD-10-CM

## 2020-06-18 DIAGNOSIS — J45.909 UNCOMPLICATED ASTHMA, UNSPECIFIED ASTHMA SEVERITY, UNSPECIFIED WHETHER PERSISTENT: ICD-10-CM

## 2020-06-18 PROCEDURE — 4040F PNEUMOC VAC/ADMIN/RCVD: CPT | Performed by: FAMILY MEDICINE

## 2020-06-18 PROCEDURE — 1160F RVW MEDS BY RX/DR IN RCRD: CPT | Performed by: FAMILY MEDICINE

## 2020-06-18 PROCEDURE — 3078F DIAST BP <80 MM HG: CPT | Performed by: FAMILY MEDICINE

## 2020-06-18 PROCEDURE — 3008F BODY MASS INDEX DOCD: CPT | Performed by: FAMILY MEDICINE

## 2020-06-18 PROCEDURE — 99214 OFFICE O/P EST MOD 30 MIN: CPT | Performed by: FAMILY MEDICINE

## 2020-06-18 PROCEDURE — 3074F SYST BP LT 130 MM HG: CPT | Performed by: FAMILY MEDICINE

## 2020-06-18 PROCEDURE — 1036F TOBACCO NON-USER: CPT | Performed by: FAMILY MEDICINE

## 2020-06-18 RX ORDER — EPINEPHRINE 0.3 MG/.3ML
0.3 INJECTION SUBCUTANEOUS ONCE
Qty: 0.6 ML | Refills: 1 | Status: SHIPPED | OUTPATIENT
Start: 2020-06-18 | End: 2022-02-14

## 2020-06-18 RX ORDER — EPINEPHRINE 0.3 MG/.3ML
0.3 INJECTION SUBCUTANEOUS ONCE
Qty: 0.6 ML | Refills: 1 | Status: SHIPPED | OUTPATIENT
Start: 2020-06-18 | End: 2020-06-18 | Stop reason: SDUPTHER

## 2020-07-11 DIAGNOSIS — E03.9 HYPOTHYROIDISM, UNSPECIFIED TYPE: ICD-10-CM

## 2020-07-13 RX ORDER — LEVOTHYROXINE, LIOTHYRONINE 57; 13.5 UG/1; UG/1
TABLET ORAL
Qty: 30 TABLET | Refills: 0 | Status: SHIPPED | OUTPATIENT
Start: 2020-07-13 | End: 2020-07-16

## 2020-07-16 DIAGNOSIS — E03.9 HYPOTHYROIDISM, UNSPECIFIED TYPE: ICD-10-CM

## 2020-07-16 RX ORDER — THYROID,PORK 90 MG
TABLET ORAL
Qty: 30 TABLET | Refills: 0 | Status: SHIPPED | OUTPATIENT
Start: 2020-07-16 | End: 2020-08-22

## 2020-08-18 ENCOUNTER — HOSPITAL ENCOUNTER (OUTPATIENT)
Dept: RADIOLOGY | Facility: HOSPITAL | Age: 75
Discharge: HOME | End: 2020-08-18
Attending: FAMILY MEDICINE
Payer: MEDICARE

## 2020-08-18 ENCOUNTER — OFFICE VISIT (OUTPATIENT)
Dept: FAMILY MEDICINE CLINIC | Facility: CLINIC | Age: 75
End: 2020-08-18
Payer: MEDICARE

## 2020-08-18 VITALS
SYSTOLIC BLOOD PRESSURE: 122 MMHG | BODY MASS INDEX: 25.44 KG/M2 | HEIGHT: 64 IN | HEART RATE: 86 BPM | DIASTOLIC BLOOD PRESSURE: 84 MMHG | WEIGHT: 149 LBS | TEMPERATURE: 97 F | OXYGEN SATURATION: 98 %

## 2020-08-18 DIAGNOSIS — S69.91XA HAND INJURY, RIGHT, INITIAL ENCOUNTER: Primary | ICD-10-CM

## 2020-08-18 DIAGNOSIS — S69.91XA HAND INJURY, RIGHT, INITIAL ENCOUNTER: ICD-10-CM

## 2020-08-18 PROCEDURE — 3008F BODY MASS INDEX DOCD: CPT | Performed by: FAMILY MEDICINE

## 2020-08-18 PROCEDURE — 3079F DIAST BP 80-89 MM HG: CPT | Performed by: FAMILY MEDICINE

## 2020-08-18 PROCEDURE — 99213 OFFICE O/P EST LOW 20 MIN: CPT | Performed by: FAMILY MEDICINE

## 2020-08-18 PROCEDURE — 73130 X-RAY EXAM OF HAND: CPT

## 2020-08-18 PROCEDURE — 4040F PNEUMOC VAC/ADMIN/RCVD: CPT | Performed by: FAMILY MEDICINE

## 2020-08-18 PROCEDURE — 1160F RVW MEDS BY RX/DR IN RCRD: CPT | Performed by: FAMILY MEDICINE

## 2020-08-18 PROCEDURE — 3074F SYST BP LT 130 MM HG: CPT | Performed by: FAMILY MEDICINE

## 2020-08-18 PROCEDURE — 1036F TOBACCO NON-USER: CPT | Performed by: FAMILY MEDICINE

## 2020-08-18 RX ORDER — MELOXICAM 7.5 MG/1
7.5 TABLET ORAL DAILY
Qty: 30 TABLET | Refills: 0 | Status: SHIPPED | OUTPATIENT
Start: 2020-08-18 | End: 2020-12-15

## 2020-08-18 NOTE — PROGRESS NOTES
Assessment/Plan:    No problem-specific Assessment & Plan notes found for this encounter  Diagnoses and all orders for this visit:    Hand injury, right, initial encounter  Does not appear to have a trigger finger  Arthritic changes noted over the right 3rd MCP at baseline with worsening of swelling after injury  Due to tenderness, will order imaging  Will change Advil to Meloxicam and emphasize use of ice  Concern that the increased inflammation is effecting the extensor tendon sheath  R/O bony fragment or bone involvement  -     XR hand 3+ vw right; Future  -     meloxicam (MOBIC) 7 5 mg tablet; Take 1 tablet (7 5 mg total) by mouth daily        Subjective:      Patient ID: Polo Chandra is a 70 y o  female  HPI     Notes that about 10 days ago she bumped her middle finger knuckle on her right hand and then developed a 'trigger finger'  Notes that this is catching and locking when trying to bend it  Notes that she is not able to type  Has been using ibuprofen and a brace since the incident with no improvement  Noes some tightness but denies pain  Denies numbness or tingling  Denies weakness  Denies pain or discomfort in wrist, other fingers or the hand  States that she had a trigger finger once before, unsure if it was the same finger  She has been using ice intermittently without improvement  The following portions of the patient's history were reviewed and updated as appropriate: allergies, current medications, past family history, past medical history, past social history, past surgical history and problem list     Review of Systems   Constitutional: Negative for activity change, appetite change, fatigue and fever  HENT: Negative for ear pain and sore throat  Eyes: Negative for pain  Respiratory: Negative for cough and shortness of breath  Cardiovascular: Negative for chest pain and leg swelling     Gastrointestinal: Negative for abdominal distention, abdominal pain, constipation, diarrhea, nausea and vomiting  Genitourinary: Negative for dysuria, frequency and urgency  Musculoskeletal: Positive for joint swelling  Negative for gait problem  Skin: Negative for rash  Neurological: Negative for dizziness, light-headedness and headaches  Objective:    /84   Pulse 86   Temp (!) 97 °F (36 1 °C)   Ht 5' 4" (1 626 m)   Wt 67 6 kg (149 lb)   SpO2 98%   BMI 25 58 kg/m²      Physical Exam  Vitals signs reviewed  Constitutional:       General: She is not in acute distress  Appearance: Normal appearance  HENT:      Head: Normocephalic and atraumatic  Right Ear: External ear normal       Left Ear: External ear normal       Nose: Nose normal       Mouth/Throat:      Mouth: Mucous membranes are moist    Eyes:      Extraocular Movements: Extraocular movements intact  Conjunctiva/sclera: Conjunctivae normal    Neck:      Musculoskeletal: Neck supple  Cardiovascular:      Rate and Rhythm: Normal rate and regular rhythm  Heart sounds: Normal heart sounds  Pulmonary:      Effort: Pulmonary effort is normal       Breath sounds: Normal breath sounds  Musculoskeletal:      Right lower leg: No edema  Left lower leg: No edema  Comments: Full ROM of right hand and fingers  Mild pain with palpation of the right 3rd finger MCP  Swelling noted over 3rd MCP  No flexor pain or nodules noted on the maldonado aspect of the hand   Lymphadenopathy:      Cervical: No cervical adenopathy  Skin:     General: Skin is warm  Capillary Refill: Capillary refill takes less than 2 seconds  Findings: No rash  Neurological:      Mental Status: She is alert  Mental status is at baseline             [facility]  8/18/2020 9:39 AM

## 2020-08-18 NOTE — PATIENT INSTRUCTIONS
Please stop taking all other NSAIDs, please start taking the Meloxicam      Please get the xray of your hand completed  Please use ice on your hand as we discussed  Follow up as needed for this issue

## 2020-08-21 ENCOUNTER — TELEPHONE (OUTPATIENT)
Dept: FAMILY MEDICINE CLINIC | Facility: CLINIC | Age: 75
End: 2020-08-21

## 2020-08-21 NOTE — TELEPHONE ENCOUNTER
----- Message from Arun Zhou DO sent at 8/21/2020 11:43 AM EDT -----  Reviewed  Not acute abnormalities  Degenerative changes noted       Arun Zhou DO  [facility]  8/21/2020 11:43 AM

## 2020-08-22 DIAGNOSIS — E03.9 HYPOTHYROIDISM, UNSPECIFIED TYPE: ICD-10-CM

## 2020-08-22 DIAGNOSIS — J45.909 UNCOMPLICATED ASTHMA, UNSPECIFIED ASTHMA SEVERITY, UNSPECIFIED WHETHER PERSISTENT: ICD-10-CM

## 2020-08-22 RX ORDER — BUDESONIDE AND FORMOTEROL FUMARATE DIHYDRATE 160; 4.5 UG/1; UG/1
AEROSOL RESPIRATORY (INHALATION)
Qty: 10.2 G | Refills: 0 | Status: SHIPPED | OUTPATIENT
Start: 2020-08-22 | End: 2020-09-21

## 2020-08-22 RX ORDER — THYROID,PORK 90 MG
TABLET ORAL
Qty: 30 TABLET | Refills: 0 | Status: SHIPPED | OUTPATIENT
Start: 2020-08-22 | End: 2020-10-22 | Stop reason: SDUPTHER

## 2020-09-15 DIAGNOSIS — I10 ESSENTIAL HYPERTENSION: ICD-10-CM

## 2020-09-15 RX ORDER — LOSARTAN POTASSIUM 50 MG/1
50 TABLET ORAL DAILY
Qty: 90 TABLET | Refills: 0 | Status: SHIPPED | OUTPATIENT
Start: 2020-09-15 | End: 2021-02-25 | Stop reason: SDUPTHER

## 2020-09-15 RX ORDER — HYDROCHLOROTHIAZIDE 12.5 MG/1
12.5 TABLET ORAL DAILY
Qty: 90 TABLET | Refills: 0 | Status: SHIPPED | OUTPATIENT
Start: 2020-09-15 | End: 2020-12-15 | Stop reason: SDUPTHER

## 2020-09-18 DIAGNOSIS — J45.909 UNCOMPLICATED ASTHMA, UNSPECIFIED ASTHMA SEVERITY, UNSPECIFIED WHETHER PERSISTENT: ICD-10-CM

## 2020-09-21 RX ORDER — BUDESONIDE AND FORMOTEROL FUMARATE DIHYDRATE 160; 4.5 UG/1; UG/1
AEROSOL RESPIRATORY (INHALATION)
Qty: 10.2 G | Refills: 0 | Status: SHIPPED | OUTPATIENT
Start: 2020-09-21 | End: 2020-11-09

## 2020-10-03 DIAGNOSIS — M19.90 ARTHRITIS: ICD-10-CM

## 2020-10-05 RX ORDER — IBUPROFEN 400 MG/1
TABLET ORAL
Qty: 90 TABLET | Refills: 5 | Status: SHIPPED | OUTPATIENT
Start: 2020-10-05 | End: 2021-11-15 | Stop reason: SDUPTHER

## 2020-10-22 DIAGNOSIS — E03.9 HYPOTHYROIDISM, UNSPECIFIED TYPE: ICD-10-CM

## 2020-10-22 RX ORDER — LEVOTHYROXINE AND LIOTHYRONINE 57; 13.5 UG/1; UG/1
90 TABLET ORAL DAILY
Qty: 30 TABLET | Refills: 0 | Status: SHIPPED | OUTPATIENT
Start: 2020-10-22 | End: 2020-12-21

## 2020-11-07 DIAGNOSIS — J45.909 UNCOMPLICATED ASTHMA, UNSPECIFIED ASTHMA SEVERITY, UNSPECIFIED WHETHER PERSISTENT: ICD-10-CM

## 2020-11-09 RX ORDER — BUDESONIDE AND FORMOTEROL FUMARATE DIHYDRATE 160; 4.5 UG/1; UG/1
AEROSOL RESPIRATORY (INHALATION)
Qty: 10.2 G | Refills: 0 | Status: SHIPPED | OUTPATIENT
Start: 2020-11-09 | End: 2021-02-03

## 2020-12-02 ENCOUNTER — LAB (OUTPATIENT)
Dept: LAB | Facility: HOSPITAL | Age: 75
End: 2020-12-02
Attending: FAMILY MEDICINE
Payer: MEDICARE

## 2020-12-02 DIAGNOSIS — E78.5 HYPERLIPIDEMIA, UNSPECIFIED HYPERLIPIDEMIA TYPE: ICD-10-CM

## 2020-12-02 DIAGNOSIS — E03.9 HYPOTHYROIDISM, UNSPECIFIED TYPE: ICD-10-CM

## 2020-12-02 LAB
ALBUMIN SERPL BCP-MCNC: 3.9 G/DL (ref 3.5–5)
ALP SERPL-CCNC: 76 U/L (ref 46–116)
ALT SERPL W P-5'-P-CCNC: 29 U/L (ref 12–78)
ANION GAP SERPL CALCULATED.3IONS-SCNC: 11 MMOL/L (ref 4–13)
AST SERPL W P-5'-P-CCNC: 20 U/L (ref 5–45)
BASOPHILS # BLD AUTO: 0.06 THOUSANDS/ΜL (ref 0–0.1)
BASOPHILS NFR BLD AUTO: 1 % (ref 0–1)
BILIRUB SERPL-MCNC: 0.3 MG/DL (ref 0.2–1)
BUN SERPL-MCNC: 19 MG/DL (ref 5–25)
CALCIUM SERPL-MCNC: 8.6 MG/DL (ref 8.3–10.1)
CHLORIDE SERPL-SCNC: 105 MMOL/L (ref 100–108)
CHOLEST SERPL-MCNC: 238 MG/DL (ref 50–200)
CO2 SERPL-SCNC: 28 MMOL/L (ref 21–32)
CREAT SERPL-MCNC: 0.92 MG/DL (ref 0.6–1.3)
EOSINOPHIL # BLD AUTO: 0.54 THOUSAND/ΜL (ref 0–0.61)
EOSINOPHIL NFR BLD AUTO: 9 % (ref 0–6)
ERYTHROCYTE [DISTWIDTH] IN BLOOD BY AUTOMATED COUNT: 13.7 % (ref 11.6–15.1)
GFR SERPL CREATININE-BSD FRML MDRD: 62 ML/MIN/1.73SQ M
GLUCOSE P FAST SERPL-MCNC: 101 MG/DL (ref 65–99)
HCT VFR BLD AUTO: 42.9 % (ref 34.8–46.1)
HDLC SERPL-MCNC: 84 MG/DL
HGB BLD-MCNC: 14.2 G/DL (ref 11.5–15.4)
IMM GRANULOCYTES # BLD AUTO: 0.02 THOUSAND/UL (ref 0–0.2)
IMM GRANULOCYTES NFR BLD AUTO: 0 % (ref 0–2)
LDLC SERPL CALC-MCNC: 140 MG/DL (ref 0–100)
LYMPHOCYTES # BLD AUTO: 2.17 THOUSANDS/ΜL (ref 0.6–4.47)
LYMPHOCYTES NFR BLD AUTO: 35 % (ref 14–44)
MCH RBC QN AUTO: 33.4 PG (ref 26.8–34.3)
MCHC RBC AUTO-ENTMCNC: 33.1 G/DL (ref 31.4–37.4)
MCV RBC AUTO: 101 FL (ref 82–98)
MONOCYTES # BLD AUTO: 0.37 THOUSAND/ΜL (ref 0.17–1.22)
MONOCYTES NFR BLD AUTO: 6 % (ref 4–12)
NEUTROPHILS # BLD AUTO: 3.01 THOUSANDS/ΜL (ref 1.85–7.62)
NEUTS SEG NFR BLD AUTO: 49 % (ref 43–75)
NONHDLC SERPL-MCNC: 154 MG/DL
NRBC BLD AUTO-RTO: 0 /100 WBCS
PLATELET # BLD AUTO: 213 THOUSANDS/UL (ref 149–390)
PMV BLD AUTO: 9.4 FL (ref 8.9–12.7)
POTASSIUM SERPL-SCNC: 3.8 MMOL/L (ref 3.5–5.3)
PROT SERPL-MCNC: 7.5 G/DL (ref 6.4–8.2)
RBC # BLD AUTO: 4.25 MILLION/UL (ref 3.81–5.12)
SODIUM SERPL-SCNC: 144 MMOL/L (ref 136–145)
TRIGL SERPL-MCNC: 68 MG/DL
TSH SERPL DL<=0.05 MIU/L-ACNC: 2.18 UIU/ML (ref 0.36–3.74)
WBC # BLD AUTO: 6.17 THOUSAND/UL (ref 4.31–10.16)

## 2020-12-02 PROCEDURE — 80053 COMPREHEN METABOLIC PANEL: CPT

## 2020-12-02 PROCEDURE — 36415 COLL VENOUS BLD VENIPUNCTURE: CPT

## 2020-12-02 PROCEDURE — 84443 ASSAY THYROID STIM HORMONE: CPT

## 2020-12-02 PROCEDURE — 80061 LIPID PANEL: CPT

## 2020-12-02 PROCEDURE — 85025 COMPLETE CBC W/AUTO DIFF WBC: CPT

## 2020-12-15 ENCOUNTER — OFFICE VISIT (OUTPATIENT)
Dept: FAMILY MEDICINE CLINIC | Facility: CLINIC | Age: 75
End: 2020-12-15
Payer: MEDICARE

## 2020-12-15 VITALS
WEIGHT: 149 LBS | DIASTOLIC BLOOD PRESSURE: 66 MMHG | HEART RATE: 92 BPM | BODY MASS INDEX: 25.44 KG/M2 | TEMPERATURE: 97 F | SYSTOLIC BLOOD PRESSURE: 104 MMHG | OXYGEN SATURATION: 97 % | HEIGHT: 64 IN

## 2020-12-15 DIAGNOSIS — N18.2 STAGE 2 CHRONIC KIDNEY DISEASE: ICD-10-CM

## 2020-12-15 DIAGNOSIS — J45.909 UNCOMPLICATED ASTHMA, UNSPECIFIED ASTHMA SEVERITY, UNSPECIFIED WHETHER PERSISTENT: ICD-10-CM

## 2020-12-15 DIAGNOSIS — E03.9 HYPOTHYROIDISM, UNSPECIFIED TYPE: ICD-10-CM

## 2020-12-15 DIAGNOSIS — E78.5 HYPERLIPIDEMIA, UNSPECIFIED HYPERLIPIDEMIA TYPE: Primary | ICD-10-CM

## 2020-12-15 DIAGNOSIS — I10 ESSENTIAL HYPERTENSION: ICD-10-CM

## 2020-12-15 DIAGNOSIS — Z00.00 MEDICARE ANNUAL WELLNESS VISIT, SUBSEQUENT: ICD-10-CM

## 2020-12-15 DIAGNOSIS — L25.9 CONTACT DERMATITIS, UNSPECIFIED CONTACT DERMATITIS TYPE, UNSPECIFIED TRIGGER: ICD-10-CM

## 2020-12-15 DIAGNOSIS — R73.03 PREDIABETES: ICD-10-CM

## 2020-12-15 PROBLEM — R07.9 CHEST PAIN: Status: RESOLVED | Noted: 2019-08-29 | Resolved: 2020-12-15

## 2020-12-15 PROBLEM — Z01.818 PREOPERATIVE EXAMINATION: Status: RESOLVED | Noted: 2019-12-10 | Resolved: 2020-12-15

## 2020-12-15 PROCEDURE — G0438 PPPS, INITIAL VISIT: HCPCS | Performed by: FAMILY MEDICINE

## 2020-12-15 PROCEDURE — 1123F ACP DISCUSS/DSCN MKR DOCD: CPT | Performed by: FAMILY MEDICINE

## 2020-12-15 PROCEDURE — 99214 OFFICE O/P EST MOD 30 MIN: CPT | Performed by: FAMILY MEDICINE

## 2020-12-15 RX ORDER — BUDESONIDE 0.5 MG/2ML
INHALANT ORAL
COMMUNITY
Start: 2020-06-19 | End: 2022-02-14

## 2020-12-15 RX ORDER — TRIAMCINOLONE ACETONIDE 5 MG/G
CREAM TOPICAL 3 TIMES DAILY
Qty: 30 G | Refills: 5 | Status: SHIPPED | OUTPATIENT
Start: 2020-12-15 | End: 2021-02-25 | Stop reason: SDUPTHER

## 2020-12-15 RX ORDER — ICOSAPENT ETHYL 1000 MG/1
2 CAPSULE ORAL 2 TIMES DAILY
Qty: 120 CAPSULE | Refills: 5 | Status: SHIPPED | OUTPATIENT
Start: 2020-12-15 | End: 2021-02-25 | Stop reason: SDUPTHER

## 2020-12-19 DIAGNOSIS — E03.9 HYPOTHYROIDISM, UNSPECIFIED TYPE: ICD-10-CM

## 2020-12-21 RX ORDER — THYROID,PORK 90 MG
TABLET ORAL
Qty: 30 TABLET | Refills: 0 | Status: SHIPPED | OUTPATIENT
Start: 2020-12-21 | End: 2021-02-25 | Stop reason: SDUPTHER

## 2021-01-31 DIAGNOSIS — J45.909 UNCOMPLICATED ASTHMA, UNSPECIFIED ASTHMA SEVERITY, UNSPECIFIED WHETHER PERSISTENT: ICD-10-CM

## 2021-02-03 RX ORDER — BUDESONIDE AND FORMOTEROL FUMARATE DIHYDRATE 160; 4.5 UG/1; UG/1
AEROSOL RESPIRATORY (INHALATION)
Qty: 10.2 G | Refills: 0 | Status: SHIPPED | OUTPATIENT
Start: 2021-02-03 | End: 2021-02-25 | Stop reason: SDUPTHER

## 2021-02-03 RX ORDER — BUDESONIDE AND FORMOTEROL FUMARATE DIHYDRATE 160; 4.5 UG/1; UG/1
2 AEROSOL RESPIRATORY (INHALATION) 2 TIMES DAILY
Qty: 10.2 G | Refills: 0 | OUTPATIENT
Start: 2021-02-03

## 2021-02-25 DIAGNOSIS — L25.9 CONTACT DERMATITIS, UNSPECIFIED CONTACT DERMATITIS TYPE, UNSPECIFIED TRIGGER: ICD-10-CM

## 2021-02-25 DIAGNOSIS — E03.9 HYPOTHYROIDISM, UNSPECIFIED TYPE: ICD-10-CM

## 2021-02-25 DIAGNOSIS — J45.909 UNCOMPLICATED ASTHMA, UNSPECIFIED ASTHMA SEVERITY, UNSPECIFIED WHETHER PERSISTENT: ICD-10-CM

## 2021-02-25 DIAGNOSIS — I10 ESSENTIAL HYPERTENSION: ICD-10-CM

## 2021-02-25 DIAGNOSIS — E78.5 HYPERLIPIDEMIA, UNSPECIFIED HYPERLIPIDEMIA TYPE: ICD-10-CM

## 2021-02-25 RX ORDER — TRIAMCINOLONE ACETONIDE 5 MG/G
CREAM TOPICAL 3 TIMES DAILY
Qty: 30 G | Refills: 5 | Status: SHIPPED | OUTPATIENT
Start: 2021-02-25

## 2021-02-25 RX ORDER — HYDROCHLOROTHIAZIDE 12.5 MG/1
12.5 TABLET ORAL DAILY
Qty: 90 TABLET | Refills: 3 | Status: SHIPPED | OUTPATIENT
Start: 2021-02-25 | End: 2022-04-03

## 2021-02-25 RX ORDER — ICOSAPENT ETHYL 1000 MG/1
2 CAPSULE ORAL 2 TIMES DAILY
Qty: 120 CAPSULE | Refills: 5 | Status: SHIPPED | OUTPATIENT
Start: 2021-02-25

## 2021-02-25 RX ORDER — LOSARTAN POTASSIUM 50 MG/1
50 TABLET ORAL DAILY
Qty: 90 TABLET | Refills: 3 | Status: SHIPPED | OUTPATIENT
Start: 2021-02-25 | End: 2022-04-03

## 2021-02-25 RX ORDER — BUDESONIDE AND FORMOTEROL FUMARATE DIHYDRATE 160; 4.5 UG/1; UG/1
2 AEROSOL RESPIRATORY (INHALATION) 2 TIMES DAILY
Qty: 10.2 G | Refills: 3 | Status: SHIPPED | OUTPATIENT
Start: 2021-02-25 | End: 2021-08-30 | Stop reason: SDUPTHER

## 2021-02-25 RX ORDER — LEVOTHYROXINE AND LIOTHYRONINE 57; 13.5 UG/1; UG/1
90 TABLET ORAL DAILY
Qty: 90 TABLET | Refills: 3 | Status: SHIPPED | OUTPATIENT
Start: 2021-02-25 | End: 2022-04-04 | Stop reason: SDUPTHER

## 2021-02-25 NOTE — TELEPHONE ENCOUNTER
Pt called and requested that all of her meds be transferred to [address] in [facility]  All meds resent

## 2021-03-05 DIAGNOSIS — Z23 ENCOUNTER FOR IMMUNIZATION: ICD-10-CM

## 2021-06-01 ENCOUNTER — TELEPHONE (OUTPATIENT)
Dept: FAMILY MEDICINE CLINIC | Facility: CLINIC | Age: 76
End: 2021-06-01

## 2021-06-01 LAB
CREAT ?TM UR-SCNC: 194 UMOL/L
EXT MICROALBUMIN URINE RANDOM: 1.6
HBA1C MFR BLD HPLC: 6.1 %
MICROALBUMIN/CREAT UR: 8.2 MG/G{CREAT}

## 2021-06-25 ENCOUNTER — TELEPHONE (OUTPATIENT)
Dept: FAMILY MEDICINE CLINIC | Facility: CLINIC | Age: 76
End: 2021-06-25

## 2021-06-25 NOTE — TELEPHONE ENCOUNTER
Pt lm on office vm to cx and rs appt for 6/28  Returned pts call, had to lm in return, told pt we cx appt and she can call when able to rs

## 2021-07-20 ENCOUNTER — OFFICE VISIT (OUTPATIENT)
Dept: FAMILY MEDICINE CLINIC | Facility: CLINIC | Age: 76
End: 2021-07-20
Payer: MEDICARE

## 2021-07-20 VITALS
DIASTOLIC BLOOD PRESSURE: 88 MMHG | WEIGHT: 148.4 LBS | TEMPERATURE: 97.5 F | BODY MASS INDEX: 25.33 KG/M2 | OXYGEN SATURATION: 98 % | SYSTOLIC BLOOD PRESSURE: 126 MMHG | HEIGHT: 64 IN | HEART RATE: 80 BPM

## 2021-07-20 DIAGNOSIS — E03.9 HYPOTHYROIDISM, UNSPECIFIED TYPE: ICD-10-CM

## 2021-07-20 DIAGNOSIS — I10 ESSENTIAL HYPERTENSION: Primary | ICD-10-CM

## 2021-07-20 DIAGNOSIS — E78.5 HYPERLIPIDEMIA, UNSPECIFIED HYPERLIPIDEMIA TYPE: ICD-10-CM

## 2021-07-20 DIAGNOSIS — R73.03 PREDIABETES: ICD-10-CM

## 2021-07-20 DIAGNOSIS — G47.62 NOCTURNAL LEG CRAMPS: ICD-10-CM

## 2021-07-20 DIAGNOSIS — J45.909 UNCOMPLICATED ASTHMA, UNSPECIFIED ASTHMA SEVERITY, UNSPECIFIED WHETHER PERSISTENT: ICD-10-CM

## 2021-07-20 PROCEDURE — 99214 OFFICE O/P EST MOD 30 MIN: CPT | Performed by: FAMILY MEDICINE

## 2021-07-20 NOTE — ASSESSMENT & PLAN NOTE
Continue armor thyroid 90 mg  She was only taking this every  Other day  She  Will begin taking this daily

## 2021-07-20 NOTE — PROGRESS NOTES
BMI Counseling: Body mass index is 25 47 kg/m²  The BMI is above normal  Nutrition recommendations include decreasing portion sizes and moderation in carbohydrate intake  Exercise recommendations include exercising 3-5 times per week  No pharmacotherapy was ordered  Assessment/Plan:         Problem List Items Addressed This Visit        Endocrine    Hypothyroidism       Continue armor thyroid 90 mg  She was only taking this every  Other day  She  Will begin taking this daily  Relevant Orders    TSH, 3rd generation with Free T4 reflex       Respiratory    Asthma      Continue Symbicort and Ventolin as needed            Cardiovascular and Mediastinum    Hypertension - Primary      Continue hydrochlorothiazide 12 5 mg and losartan 50 mg daily            Other    Hyperlipidemia      Continue Vascepa  She was only taking 1 daily she is to increased to 4 daily as recommended         Relevant Orders    Lipid panel    Comprehensive metabolic panel    Nocturnal leg cramps      She will  Apply over-the-counter magnesium cream to her legs at bedtime  Prediabetes      Low carb diet         Relevant Orders    Hemoglobin A1C            Subjective:      Patient ID: Osei Avila is a 78 y o  female  Patient comes in for checkup  She complains of nocturnal leg cramps  The following portions of the patient's history were reviewed and updated as appropriate:   Past Medical History:  She has a past medical history of Asthma and Disease of thyroid gland  ,  _______________________________________________________________________  Medical Problems:  does not have any pertinent problems on file ,  _______________________________________________________________________  Past Surgical History:   has a past surgical history that includes Mastectomy; Hand surgery (Right); Breast biopsy; Lumbar laminectomy; and Neck surgery  ,  _______________________________________________________________________  [facility] History:  family history includes Cancer in her father and mother; Diabetes in her mother; Heart disease in her mother ,  _______________________________________________________________________  Social History:   reports that she quit smoking about 34 years ago  She has never used smokeless tobacco  She reports current alcohol use  She reports current drug use  Drug: Marijuana  ,  _______________________________________________________________________  Allergies:  is allergic to penicillins, amoxicillin, cefprozil, cephalexin, ciprofloxacin, doxycycline, erythromycin, and levofloxacin     _______________________________________________________________________  Current Outpatient Medications   Medication Sig Dispense Refill    budesonide (PULMICORT) 0 5 mg/2 mL nebulizer solution Dilute one respule into 8 oz saline filled Loanne Running Med bottle  Irrigate each nostril with 4 oz of this mixture twice daily   budesonide-formoterol (Symbicort) 160-4 5 mcg/act inhaler Inhale 2 puffs 2 (two) times a day Rinse mouth after use   10 2 g 3    cetirizine (ZyrTEC) 10 mg tablet Take 10 mg by mouth daily      EPINEPHrine (EpiPen 2-Ever) 0 3 mg/0 3 mL SOAJ Inject 0 3 mL (0 3 mg total) into a muscle once for 1 dose 0 6 mL 1    fluticasone (FLONASE) 50 mcg/act nasal spray into each nostril      hydrochlorothiazide (HYDRODIURIL) 12 5 mg tablet Take 1 tablet (12 5 mg total) by mouth daily 90 tablet 3    ibuprofen (MOTRIN) 400 mg tablet TAKE ONE TABLET BY MOUTH THREE TIMES DAILY AS NEEDED  90 tablet 5    Icosapent Ethyl (Vascepa) 1 g CAPS Take 2 capsules (2 g total) by mouth 2 (two) times a day 120 capsule 5    losartan (COZAAR) 50 mg tablet Take 1 tablet (50 mg total) by mouth daily 90 tablet 3    thyroid ([place] Thyroid) 90 MG tablet Take 1 tablet (90 mg total) by mouth daily 90 tablet 3    triamcinolone (KENALOG) 0 5 % cream Apply topically 3 (three) times a day 30 g 5    VENTOLIN  (90 Base) MCG/ACT inhaler        No current facility-administered medications for this visit      _______________________________________________________________________  Review of Systems   Constitutional: Negative  Respiratory: Negative  Cardiovascular: Negative  Objective:  Vitals:    07/20/21 0844   BP: 126/88   BP Location: Left arm   Patient Position: Sitting   Cuff Size: Adult   Pulse: 80   Temp: 97 5 °F (36 4 °C)   TempSrc: Tympanic   SpO2: 98%   Weight: 67 3 kg (148 lb 6 4 oz)   Height: 5' 4" (1 626 m)     Body mass index is 25 47 kg/m²  Physical Exam  Constitutional:       Appearance: Normal appearance  She is well-developed  HENT:      Head: Normocephalic and atraumatic  Right Ear: External ear normal       Left Ear: External ear normal    Eyes:      Pupils: Pupils are equal, round, and reactive to light  Neck:      Thyroid: No thyromegaly  Cardiovascular:      Rate and Rhythm: Normal rate and regular rhythm  Heart sounds: Normal heart sounds  Pulmonary:      Effort: Pulmonary effort is normal       Breath sounds: Normal breath sounds  Abdominal:      Palpations: Abdomen is soft  Musculoskeletal:         General: Normal range of motion  Cervical back: Neck supple  Lymphadenopathy:      Cervical: No cervical adenopathy  Skin:     General: Skin is warm and dry  Neurological:      Mental Status: She is alert and oriented to person, place, and time

## 2021-08-30 ENCOUNTER — OFFICE VISIT (OUTPATIENT)
Dept: FAMILY MEDICINE CLINIC | Facility: CLINIC | Age: 76
End: 2021-08-30
Payer: MEDICARE

## 2021-08-30 VITALS
WEIGHT: 142 LBS | TEMPERATURE: 97.2 F | DIASTOLIC BLOOD PRESSURE: 74 MMHG | BODY MASS INDEX: 24.24 KG/M2 | SYSTOLIC BLOOD PRESSURE: 108 MMHG | HEIGHT: 64 IN | OXYGEN SATURATION: 94 % | HEART RATE: 71 BPM

## 2021-08-30 DIAGNOSIS — R05.9 COUGH IN ADULT: ICD-10-CM

## 2021-08-30 DIAGNOSIS — J45.909 UNCOMPLICATED ASTHMA, UNSPECIFIED ASTHMA SEVERITY, UNSPECIFIED WHETHER PERSISTENT: ICD-10-CM

## 2021-08-30 DIAGNOSIS — J45.41 MODERATE PERSISTENT ASTHMA WITH ACUTE EXACERBATION: Primary | ICD-10-CM

## 2021-08-30 PROBLEM — Z87.412 HISTORY OF VULVAR DYSPLASIA: Status: ACTIVE | Noted: 2017-12-08

## 2021-08-30 PROBLEM — N90.4 LICHEN SCLEROSUS ET ATROPHICUS OF THE VULVA: Status: ACTIVE | Noted: 2019-12-16

## 2021-08-30 PROBLEM — J98.4 RESTRICTIVE LUNG DISEASE: Status: ACTIVE | Noted: 2018-01-09

## 2021-08-30 PROCEDURE — 99213 OFFICE O/P EST LOW 20 MIN: CPT | Performed by: PHYSICIAN ASSISTANT

## 2021-08-30 RX ORDER — PROMETHAZINE HYDROCHLORIDE AND CODEINE PHOSPHATE 6.25; 1 MG/5ML; MG/5ML
5 SYRUP ORAL EVERY 4 HOURS PRN
Qty: 180 ML | Refills: 1 | Status: SHIPPED | OUTPATIENT
Start: 2021-08-30

## 2021-08-30 RX ORDER — PREDNISONE 10 MG/1
TABLET ORAL
Qty: 30 TABLET | Refills: 0 | Status: SHIPPED | OUTPATIENT
Start: 2021-08-30

## 2021-08-30 RX ORDER — BUDESONIDE AND FORMOTEROL FUMARATE DIHYDRATE 160; 4.5 UG/1; UG/1
2 AEROSOL RESPIRATORY (INHALATION) 2 TIMES DAILY
Qty: 10.2 G | Refills: 3 | Status: SHIPPED | OUTPATIENT
Start: 2021-08-30 | End: 2022-04-15

## 2021-08-30 NOTE — PROGRESS NOTES
Assessment/Plan:          Diagnoses and all orders for this visit:    Moderate persistent asthma with acute exacerbation  -     predniSONE 10 mg tablet; 5 po x 2 d, 4 po x 2 , 3 po x 2 d, 2 po x 2 d, 1 po x 2 d    Uncomplicated asthma, unspecified asthma severity, unspecified whether persistent  -     budesonide-formoterol (Symbicort) 160-4 5 mcg/act inhaler; Inhale 2 puffs 2 (two) times a day Rinse mouth after use  Cough in adult  -     promethazine-codeine (PHENERGAN WITH CODEINE) 6 25-10 mg/5 mL syrup; Take 5 mL by mouth every 4 (four) hours as needed for cough            Subjective:      Patient ID: Vaishali Acosta is a 78 y o  female  Sore Throat   This is a new problem  The current episode started in the past 7 days  The problem has been unchanged  There has been no fever  The pain is mild  Associated symptoms include congestion, coughing, headaches and shortness of breath  Pertinent negatives include no abdominal pain, ear pain, plugged ear sensation, stridor or trouble swallowing  The following portions of the patient's history were reviewed and updated as appropriate:   She has a past medical history of Asthma and Disease of thyroid gland  ,  does not have any pertinent problems on file  ,   has a past surgical history that includes Mastectomy; Hand surgery (Right); Breast biopsy; Lumbar laminectomy; and Neck surgery  ,  family history includes Cancer in her father and mother; Diabetes in her mother; Heart disease in her mother  ,   reports that she quit smoking about 34 years ago  She has never used smokeless tobacco  She reports current alcohol use  She reports current drug use  Drug: Marijuana  ,  is allergic to penicillins, amoxicillin, cefprozil, cephalexin, ciprofloxacin, doxycycline, erythromycin, and levofloxacin     Current Outpatient Medications   Medication Sig Dispense Refill    budesonide (PULMICORT) 0 5 mg/2 mL nebulizer solution Dilute one respule into 8 oz saline filled [address] 83 bottle  Irrigate each nostril with 4 oz of this mixture twice daily   budesonide-formoterol (Symbicort) 160-4 5 mcg/act inhaler Inhale 2 puffs 2 (two) times a day Rinse mouth after use  10 2 g 3    cetirizine (ZyrTEC) 10 mg tablet Take 10 mg by mouth daily      EPINEPHrine (EpiPen 2-Ever) 0 3 mg/0 3 mL SOAJ Inject 0 3 mL (0 3 mg total) into a muscle once for 1 dose 0 6 mL 1    fluticasone (FLONASE) 50 mcg/act nasal spray into each nostril      hydrochlorothiazide (HYDRODIURIL) 12 5 mg tablet Take 1 tablet (12 5 mg total) by mouth daily 90 tablet 3    ibuprofen (MOTRIN) 400 mg tablet TAKE ONE TABLET BY MOUTH THREE TIMES DAILY AS NEEDED  90 tablet 5    Icosapent Ethyl (Vascepa) 1 g CAPS Take 2 capsules (2 g total) by mouth 2 (two) times a day 120 capsule 5    losartan (COZAAR) 50 mg tablet Take 1 tablet (50 mg total) by mouth daily 90 tablet 3    thyroid ([place] Thyroid) 90 MG tablet Take 1 tablet (90 mg total) by mouth daily 90 tablet 3    triamcinolone (KENALOG) 0 5 % cream Apply topically 3 (three) times a day 30 g 5    VENTOLIN  (90 Base) MCG/ACT inhaler       predniSONE 10 mg tablet 5 po x 2 d, 4 po x 2 , 3 po x 2 d, 2 po x 2 d, 1 po x 2 d 30 tablet 0    promethazine-codeine (PHENERGAN WITH CODEINE) 6 25-10 mg/5 mL syrup Take 5 mL by mouth every 4 (four) hours as needed for cough 180 mL 1     No current facility-administered medications for this visit  Review of Systems   Constitutional: Negative for chills and fever  HENT: Positive for congestion, sinus pressure and sore throat  Negative for ear pain and trouble swallowing  Respiratory: Positive for cough, chest tightness, shortness of breath and wheezing  Negative for stridor  Gastrointestinal: Negative for abdominal pain  Neurological: Positive for headaches  Negative for dizziness and light-headedness           Objective:  Vitals:    08/30/21 1423   BP: 108/74   Pulse: 71   Temp: (!) 97 2 °F (36 2 °C)   SpO2: 94% Weight: 64 4 kg (142 lb)   Height: 5' 4" (1 626 m)     Body mass index is 24 37 kg/m²  Physical Exam  Vitals and nursing note reviewed  Constitutional:       Appearance: Normal appearance  HENT:      Head: Normocephalic and atraumatic  Right Ear: Tympanic membrane, ear canal and external ear normal       Left Ear: Tympanic membrane, ear canal and external ear normal       Nose: Nose normal       Mouth/Throat:      Mouth: Mucous membranes are moist       Pharynx: Oropharynx is clear  No oropharyngeal exudate or posterior oropharyngeal erythema  Eyes:      Conjunctiva/sclera: Conjunctivae normal    Cardiovascular:      Rate and Rhythm: Normal rate and regular rhythm  Heart sounds: Normal heart sounds  Pulmonary:      Breath sounds: Wheezing present  Musculoskeletal:      Cervical back: Normal range of motion  Lymphadenopathy:      Cervical: No cervical adenopathy  Skin:     General: Skin is dry  Neurological:      Mental Status: She is alert and oriented to person, place, and time     Psychiatric:         Mood and Affect: Mood normal          Behavior: Behavior normal

## 2021-11-15 DIAGNOSIS — M19.90 ARTHRITIS: ICD-10-CM

## 2021-11-15 RX ORDER — IBUPROFEN 400 MG/1
400 TABLET ORAL 3 TIMES DAILY PRN
Qty: 90 TABLET | Refills: 1 | Status: SHIPPED | OUTPATIENT
Start: 2021-11-15

## 2022-02-09 ENCOUNTER — APPOINTMENT (OUTPATIENT)
Dept: LAB | Facility: HOSPITAL | Age: 77
End: 2022-02-09
Payer: MEDICARE

## 2022-02-09 DIAGNOSIS — E03.9 HYPOTHYROIDISM, UNSPECIFIED TYPE: ICD-10-CM

## 2022-02-09 DIAGNOSIS — E78.5 HYPERLIPIDEMIA, UNSPECIFIED HYPERLIPIDEMIA TYPE: ICD-10-CM

## 2022-02-09 DIAGNOSIS — R73.03 PREDIABETES: ICD-10-CM

## 2022-02-09 LAB
ALBUMIN SERPL BCP-MCNC: 4.3 G/DL (ref 3.5–5)
ALP SERPL-CCNC: 50 U/L (ref 46–116)
ALT SERPL W P-5'-P-CCNC: 33 U/L (ref 12–78)
ANION GAP SERPL CALCULATED.3IONS-SCNC: 9 MMOL/L (ref 4–13)
AST SERPL W P-5'-P-CCNC: 24 U/L (ref 5–45)
BACTERIA UR QL AUTO: NORMAL /HPF
BILIRUB SERPL-MCNC: 0.48 MG/DL (ref 0.2–1)
BILIRUB UR QL STRIP: NEGATIVE
BUN SERPL-MCNC: 27 MG/DL (ref 5–25)
CALCIUM SERPL-MCNC: 9.4 MG/DL (ref 8.3–10.1)
CHLORIDE SERPL-SCNC: 105 MMOL/L (ref 100–108)
CHOLEST SERPL-MCNC: 212 MG/DL
CLARITY UR: CLEAR
CO2 SERPL-SCNC: 30 MMOL/L (ref 21–32)
COLOR UR: YELLOW
CREAT SERPL-MCNC: 0.92 MG/DL (ref 0.6–1.3)
CREAT UR-MCNC: 134 MG/DL
GFR SERPL CREATININE-BSD FRML MDRD: 61 ML/MIN/1.73SQ M
GLUCOSE P FAST SERPL-MCNC: 92 MG/DL (ref 65–99)
GLUCOSE UR STRIP-MCNC: NEGATIVE MG/DL
HDLC SERPL-MCNC: 101 MG/DL
HGB UR QL STRIP.AUTO: ABNORMAL
KETONES UR STRIP-MCNC: ABNORMAL MG/DL
LDLC SERPL CALC-MCNC: 94 MG/DL (ref 0–100)
LEUKOCYTE ESTERASE UR QL STRIP: NEGATIVE
MICROALBUMIN UR-MCNC: 21.9 MG/L (ref 0–20)
MICROALBUMIN/CREAT 24H UR: 16 MG/G CREATININE (ref 0–30)
NITRITE UR QL STRIP: NEGATIVE
NON-SQ EPI CELLS URNS QL MICRO: NORMAL /HPF
NONHDLC SERPL-MCNC: 111 MG/DL
PH UR STRIP.AUTO: 5.5 [PH]
POTASSIUM SERPL-SCNC: 4 MMOL/L (ref 3.5–5.3)
PROT SERPL-MCNC: 7.9 G/DL (ref 6.4–8.2)
PROT UR STRIP-MCNC: NEGATIVE MG/DL
RBC #/AREA URNS AUTO: NORMAL /HPF
SODIUM SERPL-SCNC: 144 MMOL/L (ref 136–145)
SP GR UR STRIP.AUTO: 1.02 (ref 1–1.03)
TRIGL SERPL-MCNC: 83 MG/DL
TSH SERPL DL<=0.05 MIU/L-ACNC: 2.34 UIU/ML (ref 0.36–3.74)
UROBILINOGEN UR QL STRIP.AUTO: 0.2 E.U./DL
WBC #/AREA URNS AUTO: NORMAL /HPF

## 2022-02-09 PROCEDURE — 80053 COMPREHEN METABOLIC PANEL: CPT

## 2022-02-09 PROCEDURE — 81001 URINALYSIS AUTO W/SCOPE: CPT | Performed by: FAMILY MEDICINE

## 2022-02-09 PROCEDURE — 36415 COLL VENOUS BLD VENIPUNCTURE: CPT

## 2022-02-09 PROCEDURE — 82043 UR ALBUMIN QUANTITATIVE: CPT | Performed by: FAMILY MEDICINE

## 2022-02-09 PROCEDURE — 83036 HEMOGLOBIN GLYCOSYLATED A1C: CPT

## 2022-02-09 PROCEDURE — 84443 ASSAY THYROID STIM HORMONE: CPT

## 2022-02-09 PROCEDURE — 82570 ASSAY OF URINE CREATININE: CPT | Performed by: FAMILY MEDICINE

## 2022-02-09 PROCEDURE — 80061 LIPID PANEL: CPT

## 2022-02-10 LAB
EST. AVERAGE GLUCOSE BLD GHB EST-MCNC: 123 MG/DL
HBA1C MFR BLD: 5.9 %

## 2022-02-14 ENCOUNTER — OFFICE VISIT (OUTPATIENT)
Dept: FAMILY MEDICINE CLINIC | Facility: CLINIC | Age: 77
End: 2022-02-14
Payer: MEDICARE

## 2022-02-14 VITALS
SYSTOLIC BLOOD PRESSURE: 148 MMHG | OXYGEN SATURATION: 97 % | HEIGHT: 64 IN | DIASTOLIC BLOOD PRESSURE: 92 MMHG | BODY MASS INDEX: 25.37 KG/M2 | TEMPERATURE: 97.8 F | HEART RATE: 75 BPM | WEIGHT: 148.6 LBS

## 2022-02-14 DIAGNOSIS — I10 PRIMARY HYPERTENSION: ICD-10-CM

## 2022-02-14 DIAGNOSIS — Z00.00 MEDICARE ANNUAL WELLNESS VISIT, SUBSEQUENT: Primary | ICD-10-CM

## 2022-02-14 DIAGNOSIS — R73.03 PREDIABETES: ICD-10-CM

## 2022-02-14 DIAGNOSIS — C50.919 MALIGNANT NEOPLASM OF FEMALE BREAST, UNSPECIFIED ESTROGEN RECEPTOR STATUS, UNSPECIFIED LATERALITY, UNSPECIFIED SITE OF BREAST (HCC): ICD-10-CM

## 2022-02-14 DIAGNOSIS — E03.9 HYPOTHYROIDISM, UNSPECIFIED TYPE: ICD-10-CM

## 2022-02-14 DIAGNOSIS — J30.2 SEASONAL ALLERGIC RHINITIS, UNSPECIFIED TRIGGER: ICD-10-CM

## 2022-02-14 DIAGNOSIS — J45.909 UNCOMPLICATED ASTHMA, UNSPECIFIED ASTHMA SEVERITY, UNSPECIFIED WHETHER PERSISTENT: ICD-10-CM

## 2022-02-14 PROBLEM — M54.2 NECK PAIN: Status: RESOLVED | Noted: 2017-03-17 | Resolved: 2022-02-14

## 2022-02-14 PROCEDURE — 99214 OFFICE O/P EST MOD 30 MIN: CPT | Performed by: FAMILY MEDICINE

## 2022-02-14 PROCEDURE — G0439 PPPS, SUBSEQ VISIT: HCPCS | Performed by: FAMILY MEDICINE

## 2022-02-14 RX ORDER — BEMPEDOIC ACID 180 MG/1
1 TABLET, FILM COATED ORAL EVERY MORNING
COMMUNITY
Start: 2022-01-24

## 2022-02-14 NOTE — PROGRESS NOTES
Assessment and Plan:     Problem List Items Addressed This Visit     None      Visit Diagnoses     Medicare annual wellness visit, subsequent    -  Primary           Preventive health issues were discussed with patient, and age appropriate screening tests were ordered as noted in patient's After Visit Summary  Personalized health advice and appropriate referrals for health education or preventive services given if needed, as noted in patient's After Visit Summary       History of Present Illness:     Patient presents for Medicare Annual Wellness visit    Patient Care Team:  Michelle Granger MD as PCP - Mary Negrete MD     Problem List:     Patient Active Problem List   Diagnosis    Allergic rhinitis, seasonal    Anemia    Anxiety    Arthritis    Asthma    Breast cancer ([address] )    Neck pain    Hypertension    Fatty liver    Gout    Hyperlipidemia    Hypothyroidism    Hyperuricemia    Vision disturbance    Nocturnal leg cramps    Prediabetes    History of breast cancer    Cataract    Stage 2 chronic kidney disease    Contact dermatitis    History of vulvar dysplasia    Lichen sclerosus et atrophicus of the vulva    Restrictive lung disease      Past Medical and Surgical History:     Past Medical History:   Diagnosis Date    Asthma     Disease of thyroid gland      Past Surgical History:   Procedure Laterality Date    BREAST BIOPSY      HAND SURGERY Right     Thumb/Wrist Prosthesis    LUMBAR LAMINECTOMY      MASTECTOMY      NECK SURGERY        Family History:     Family History   Problem Relation Age of Onset    Cancer Mother     Diabetes Mother     Heart disease Mother     Cancer Father       Social History:     Social History     Socioeconomic History    Marital status:      Spouse name: None    Number of children: None    Years of education: None    Highest education level: None   Occupational History    None   Tobacco Use    Smoking status: Former Smoker     Quit date: 1986     Years since quittin 2    Smokeless tobacco: Never Used   Vaping Use    Vaping Use: Never used   Substance and Sexual Activity    Alcohol use: Yes    Drug use: Yes     Types: Marijuana    Sexual activity: None   Other Topics Concern    None   Social History Narrative    Golf     Never uses seat belt      Social Determinants of Health     Financial Resource Strain: Not on file   Food Insecurity: Not on file   Transportation Needs: Not on file   Physical Activity: Not on file   Stress: Not on file   Social Connections: Not on file   Intimate Partner Violence: Not on file   Housing Stability: Not on file      Medications and Allergies:     Current Outpatient Medications   Medication Sig Dispense Refill    budesonide-formoterol (Symbicort) 160-4 5 mcg/act inhaler Inhale 2 puffs 2 (two) times a day Rinse mouth after use   10 2 g 3    cetirizine (ZyrTEC) 10 mg tablet Take 10 mg by mouth daily      EPINEPHrine (EpiPen 2-Ever) 0 3 mg/0 3 mL SOAJ Inject 0 3 mL (0 3 mg total) into a muscle once for 1 dose 0 6 mL 1    hydrochlorothiazide (HYDRODIURIL) 12 5 mg tablet Take 1 tablet (12 5 mg total) by mouth daily 90 tablet 3    ibuprofen (MOTRIN) 400 mg tablet Take 1 tablet (400 mg total) by mouth 3 (three) times a day as needed for mild pain 90 tablet 1    Icosapent Ethyl (Vascepa) 1 g CAPS Take 2 capsules (2 g total) by mouth 2 (two) times a day 120 capsule 5    losartan (COZAAR) 50 mg tablet Take 1 tablet (50 mg total) by mouth daily 90 tablet 3    Nexletol 180 MG TABS Take 1 tablet by mouth every morning      predniSONE 10 mg tablet 5 po x 2 d, 4 po x 2 , 3 po x 2 d, 2 po x 2 d, 1 po x 2 d (Patient taking differently: as needed 5 po x 2 d, 4 po x 2 , 3 po x 2 d, 2 po x 2 d, 1 po x 2 d ) 30 tablet 0    promethazine-codeine (PHENERGAN WITH CODEINE) 6 25-10 mg/5 mL syrup Take 5 mL by mouth every 4 (four) hours as needed for cough 180 mL 1    thyroid ([place] Thyroid) 90 MG tablet Take 1 tablet (90 mg total) by mouth daily 90 tablet 3    triamcinolone (KENALOG) 0 5 % cream Apply topically 3 (three) times a day 30 g 5    VENTOLIN  (90 Base) MCG/ACT inhaler       budesonide (PULMICORT) 0 5 mg/2 mL nebulizer solution Dilute one respule into 8 oz saline filled [facility] Med bottle  Irrigate each nostril with 4 oz of this mixture twice daily  (Patient not taking: Reported on 2/14/2022)      fluticasone (FLONASE) 50 mcg/act nasal spray into each nostril (Patient not taking: Reported on 2/14/2022 )       No current facility-administered medications for this visit  Allergies   Allergen Reactions    Penicillins     Amoxicillin Rash    Cefprozil Rash    Cephalexin Rash    Ciprofloxacin Rash    Doxycycline Rash    Erythromycin Rash    Levofloxacin Rash      Immunizations:     Immunization History   Administered Date(s) Administered    COVID-19 J&J ([employer]) vaccine 0 5 mL 03/26/2021    COVID-19 MODERNA VACC 0 5 ML IM 11/20/2021    Pneumococcal Conjugate 13-Valent 04/02/2015    Pneumococcal Polysaccharide PPV23 09/16/2011, 10/21/2016    Tdap 11/17/2015    Zoster 06/13/2015      Health Maintenance:         Topic Date Due    Breast Cancer Screening: Mammogram  09/24/2020    Colorectal Cancer Screening  11/13/2024    Hepatitis C Screening  Addressed         Topic Date Due    Influenza Vaccine (1) Never done      Medicare Health Risk Assessment:     /92 (BP Location: Left arm, Patient Position: Sitting, Cuff Size: Adult)   Pulse 75   Temp 97 8 °F (36 6 °C)   Ht 5' 4" (1 626 m)   Wt 67 4 kg (148 lb 9 6 oz)   SpO2 97%   BMI 25 51 kg/m²      Nilton Ortega is here for her Subsequent Wellness visit  Last Medicare Wellness visit information reviewed, patient interviewed, no change since last AWV  Health Risk Assessment:   Patient rates overall health as very good  Patient feels that their physical health rating is same  Patient is satisfied with their life  Eyesight was rated as same  Hearing was rated as same  Patient feels that their emotional and mental health rating is same  Patients states they are sometimes angry  Patient states they are never, rarely unusually tired/fatigued  Pain experienced in the last 7 days has been none  Patient states that she has experienced no weight loss or gain in last 6 months  Depression Screening:   PHQ-2 Score: 0      Fall Risk Screening: In the past year, patient has experienced: history of falling in past year    Number of falls: 1  Injured during fall?: No    Feels unsteady when standing or walking?: No    Worried about falling?: No      Urinary Incontinence Screening:   Patient has leaked urine accidently in the last six months  Home Safety:  Patient does not have trouble with stairs inside or outside of their home  Patient has working smoke alarms and has working carbon monoxide detector  Home safety hazards include: none  Nutrition:   Current diet is Regular  Medications:   Patient is currently taking over-the-counter supplements  OTC medications include: see medication list  Patient is able to manage medications  Activities of Daily Living (ADLs)/Instrumental Activities of Daily Living (IADLs):   Walk and transfer into and out of bed and chair?: Yes  Dress and groom yourself?: Yes    Bathe or shower yourself?: Yes    Feed yourself?  Yes  Do your laundry/housekeeping?: Yes  Manage your money, pay your bills and track your expenses?: Yes  Make your own meals?: Yes    Do your own shopping?: Yes    Previous Hospitalizations:   Any hospitalizations or ED visits within the last 12 months?: No      Advance Care Planning:   Living will: No    Durable POA for healthcare: No    Advanced directive: No    Advanced directive counseling given: Yes    Five wishes given: Yes    End of Life Decisions reviewed with patient: Yes    Provider agrees with end of life decisions: Yes      PREVENTIVE SCREENINGS      Cardiovascular Screening:    General: Screening Not Indicated and History Lipid Disorder      Diabetes Screening:     General: Screening Current      Colorectal Cancer Screening:     General: Screening Current      Breast Cancer Screening:     General: History Breast Cancer      Cervical Cancer Screening:    General: Screening Not Indicated      Osteoporosis Screening:    General: Risks and Benefits Discussed    Due for: DXA Axial      Abdominal Aortic Aneurysm (AAA) Screening:        General: Screening Current      Lung Cancer Screening:     General: Screening Not Indicated      Hepatitis C Screening:    General: Screening Current    Screening, Brief Intervention, and Referral to Treatment (SBIRT)    Screening  Typical number of drinks in a day: 3  Typical number of drinks in a week: 21  Interpretation: Low risk drinking behavior  Single Item Drug Screening:  How often have you used an illegal drug (including marijuana) or a prescription medication for non-medical reasons in the past year? weekly    Single Item Drug Screen Score: 3  Interpretation: POSITIVE screen for possible drug use disorder    Drug Abuse Screening Test (DAST-10):  1) Have you used drugs other than those required for medical reasons? Yes  2) Do you abuse more than one drug at a time? No  3) Are you always able to stop using drugs when you want to? Yes  4) Have you had "blackouts" or "flashbacks" as a result of drug use? No  5) Do you ever feel bad or guilty about your drug use? No  6) Does your spouse (or parents) ever complain about your involvement with drugs? No  7) Have you neglected your family because of your use of drugs? No  8) Have you engaged in illegal activities in order to obtain drugs? No  9) Have you ever experienced withdrawal symptoms (felt sick) when you stopped taking drugs? No  10) Have you had medical problems as a result of your drug use (e g , memory loss, hepatitis, convulsions, bleeding, etc )?  No    DAST-10 Score: 1  Interpretation: Low level problems related to drug abuse      Eula Otto MD

## 2022-02-14 NOTE — PROGRESS NOTES
BMI Counseling: Body mass index is 25 51 kg/m²  The BMI is above normal  Nutrition recommendations include decreasing portion sizes and moderation in carbohydrate intake  Exercise recommendations include exercising 3-5 times per week  No pharmacotherapy was ordered  Rationale for BMI follow-up plan is due to patient being overweight or obese  Depression Screening and Follow-up Plan: Patient was screened for depression during today's encounter  They screened negative with a PHQ-2 score of 0  Assessment/Plan:     Return visit in 6 months     Problem List Items Addressed This Visit        Endocrine    Hypothyroidism     Continue armor Thyroid 90 mg daily            Respiratory    Allergic rhinitis, seasonal    Asthma     Continue Symbicort and Ventolin as needed            Cardiovascular and Mediastinum    Hypertension       Other    Breast cancer ([address] )    Prediabetes     Low carb diet           Other Visit Diagnoses     Medicare annual wellness visit, subsequent    -  Primary            Subjective:      Patient ID: Aliyah Dyson is a 78 y o  female  Patient comes in for checkup she is now seeing Cardiology who is managing her lipids  The following portions of the patient's history were reviewed and updated as appropriate:   Past Medical History:  She has a past medical history of Asthma and Disease of thyroid gland  ,  _______________________________________________________________________  Medical Problems:  does not have any pertinent problems on file ,  _______________________________________________________________________  Past Surgical History:   has a past surgical history that includes Mastectomy; Hand surgery (Right); Breast biopsy; Lumbar laminectomy; and Neck surgery  ,  _______________________________________________________________________  Family History:  family history includes Cancer in her father and mother; Diabetes in her mother; Heart disease in her mother ,  _______________________________________________________________________  Social History:   reports that she quit smoking about 35 years ago  She has never used smokeless tobacco  She reports current alcohol use  She reports current drug use  Drug: Marijuana  ,  _______________________________________________________________________  Allergies:  is allergic to penicillins, amoxicillin, cefprozil, cephalexin, ciprofloxacin, doxycycline, erythromycin, and levofloxacin     _______________________________________________________________________  Current Outpatient Medications   Medication Sig Dispense Refill    budesonide-formoterol (Symbicort) 160-4 5 mcg/act inhaler Inhale 2 puffs 2 (two) times a day Rinse mouth after use   10 2 g 3    cetirizine (ZyrTEC) 10 mg tablet Take 10 mg by mouth daily      EPINEPHrine (EpiPen 2-Ever) 0 3 mg/0 3 mL SOAJ Inject 0 3 mL (0 3 mg total) into a muscle once for 1 dose 0 6 mL 1    hydrochlorothiazide (HYDRODIURIL) 12 5 mg tablet Take 1 tablet (12 5 mg total) by mouth daily 90 tablet 3    ibuprofen (MOTRIN) 400 mg tablet Take 1 tablet (400 mg total) by mouth 3 (three) times a day as needed for mild pain 90 tablet 1    Icosapent Ethyl (Vascepa) 1 g CAPS Take 2 capsules (2 g total) by mouth 2 (two) times a day 120 capsule 5    losartan (COZAAR) 50 mg tablet Take 1 tablet (50 mg total) by mouth daily 90 tablet 3    Nexletol 180 MG TABS Take 1 tablet by mouth every morning      predniSONE 10 mg tablet 5 po x 2 d, 4 po x 2 , 3 po x 2 d, 2 po x 2 d, 1 po x 2 d (Patient taking differently: as needed 5 po x 2 d, 4 po x 2 , 3 po x 2 d, 2 po x 2 d, 1 po x 2 d ) 30 tablet 0    promethazine-codeine (PHENERGAN WITH CODEINE) 6 25-10 mg/5 mL syrup Take 5 mL by mouth every 4 (four) hours as needed for cough 180 mL 1    thyroid ([place] Thyroid) 90 MG tablet Take 1 tablet (90 mg total) by mouth daily 90 tablet 3    triamcinolone (KENALOG) 0 5 % cream Apply topically 3 (three) times a day 30 g 5    VENTOLIN  (90 Base) MCG/ACT inhaler       fluticasone (FLONASE) 50 mcg/act nasal spray into each nostril (Patient not taking: Reported on 2/14/2022 )       No current facility-administered medications for this visit      _______________________________________________________________________  Review of Systems   Constitutional: Negative  Respiratory: Negative  Cardiovascular: Negative  Objective:  Vitals:    02/14/22 1733   BP: 148/92   BP Location: Left arm   Patient Position: Sitting   Cuff Size: Adult   Pulse: 75   Temp: 97 8 °F (36 6 °C)   SpO2: 97%   Weight: 67 4 kg (148 lb 9 6 oz)   Height: 5' 4" (1 626 m)     Body mass index is 25 51 kg/m²  Physical Exam  Constitutional:       Appearance: Normal appearance  She is well-developed  HENT:      Head: Normocephalic and atraumatic  Eyes:      Pupils: Pupils are equal, round, and reactive to light  Neck:      Thyroid: No thyromegaly  Cardiovascular:      Rate and Rhythm: Normal rate and regular rhythm  Heart sounds: Normal heart sounds  Pulmonary:      Effort: Pulmonary effort is normal       Breath sounds: Normal breath sounds  Abdominal:      Palpations: Abdomen is soft  Musculoskeletal:         General: Normal range of motion  Cervical back: Neck supple  Lymphadenopathy:      Cervical: No cervical adenopathy  Skin:     General: Skin is warm and dry  Neurological:      Mental Status: She is alert     Psychiatric:         Mood and Affect: Mood normal

## 2022-02-14 NOTE — PATIENT INSTRUCTIONS
Medicare Preventive Visit Patient Instructions  Thank you for completing your Welcome to Medicare Visit or Medicare Annual Wellness Visit today  Your next wellness visit will be due in one year (2/15/2023)  The screening/preventive services that you may require over the next 5-10 years are detailed below  Some tests may not apply to you based off risk factors and/or age  Screening tests ordered at today's visit but not completed yet may show as past due  Also, please note that scanned in results may not display below  Preventive Screenings:  Service Recommendations Previous Testing/Comments   Colorectal Cancer Screening  * Colonoscopy    * Fecal Occult Blood Test (FOBT)/Fecal Immunochemical Test (FIT)  * Fecal DNA/Cologuard Test  * Flexible Sigmoidoscopy Age: 54-65 years old   Colonoscopy: every 10 years (may be performed more frequently if at higher risk)  OR  FOBT/FIT: every 1 year  OR  Cologuard: every 3 years  OR  Sigmoidoscopy: every 5 years  Screening may be recommended earlier than age 48 if at higher risk for colorectal cancer  Also, an individualized decision between you and your healthcare provider will decide whether screening between the ages of 74-80 would be appropriate  Colonoscopy: 11/13/2019  FOBT/FIT: Not on file  Cologuard: Not on file  Sigmoidoscopy: Not on file    Screening Current     Breast Cancer Screening Age: 38 years old  Frequency: every 1-2 years  Not required if history of left and right mastectomy Mammogram: 09/24/2018    History Breast Cancer   Cervical Cancer Screening Between the ages of 21-29, pap smear recommended once every 3 years  Between the ages of 33-67, can perform pap smear with HPV co-testing every 5 years     Recommendations may differ for women with a history of total hysterectomy, cervical cancer, or abnormal pap smears in past  Pap Smear: Not on file    Screening Not Indicated   Hepatitis C Screening Once for adults born between 1945 and 1965  More frequently in patients at high risk for Hepatitis C Hep C Antibody: Not on file    Screening Current   Diabetes Screening 1-2 times per year if you're at risk for diabetes or have pre-diabetes Fasting glucose: 92 mg/dL   A1C: 5 9 %    Screening Current   Cholesterol Screening Once every 5 years if you don't have a lipid disorder  May order more often based on risk factors  Lipid panel: 02/09/2022    Screening Not Indicated  History Lipid Disorder     Other Preventive Screenings Covered by Medicare:  1  Abdominal Aortic Aneurysm (AAA) Screening: covered once if your at risk  You're considered to be at risk if you have a family history of AAA  2  Lung Cancer Screening: covers low dose CT scan once per year if you meet all of the following conditions: (1) Age 50-69; (2) No signs or symptoms of lung cancer; (3) Current smoker or have quit smoking within the last 15 years; (4) You have a tobacco smoking history of at least 30 pack years (packs per day multiplied by number of years you smoked); (5) You get a written order from a healthcare provider  3  Glaucoma Screening: covered annually if you're considered high risk: (1) You have diabetes OR (2) Family history of glaucoma OR (3)  aged 48 and older OR (3)  American aged 72 and older  3  Osteoporosis Screening: covered every 2 years if you meet one of the following conditions: (1) You're estrogen deficient and at risk for osteoporosis based off medical history and other findings; (2) Have a vertebral abnormality; (3) On glucocorticoid therapy for more than 3 months; (4) Have primary hyperparathyroidism; (5) On osteoporosis medications and need to assess response to drug therapy  · Last bone density test (DXA Scan): 08/14/2014   5  HIV Screening: covered annually if you're between the age of 15-65  Also covered annually if you are younger than 13 and older than 72 with risk factors for HIV infection   For pregnant patients, it is covered up to 3 times per pregnancy  Immunizations:  Immunization Recommendations   Influenza Vaccine Annual influenza vaccination during flu season is recommended for all persons aged >= 6 months who do not have contraindications   Pneumococcal Vaccine (Prevnar and Pneumovax)  * Prevnar = PCV13  * Pneumovax = PPSV23   Adults 25-60 years old: 1-3 doses may be recommended based on certain risk factors  Adults 72 years old: Prevnar (PCV13) vaccine recommended followed by Pneumovax (PPSV23) vaccine  If already received PPSV23 since turning 65, then PCV13 recommended at least one year after PPSV23 dose  Hepatitis B Vaccine 3 dose series if at intermediate or high risk (ex: diabetes, end stage renal disease, liver disease)   Tetanus (Td) Vaccine - COST NOT COVERED BY MEDICARE PART B Following completion of primary series, a booster dose should be given every 10 years to maintain immunity against tetanus  Td may also be given as tetanus wound prophylaxis  Tdap Vaccine - COST NOT COVERED BY MEDICARE PART B Recommended at least once for all adults  For pregnant patients, recommended with each pregnancy  Shingles Vaccine (Shingrix) - COST NOT COVERED BY MEDICARE PART B  2 shot series recommended in those aged 48 and above     Health Maintenance Due:      Topic Date Due    Breast Cancer Screening: Mammogram  09/24/2020    Colorectal Cancer Screening  11/13/2024    Hepatitis C Screening  Addressed     Immunizations Due:      Topic Date Due    Influenza Vaccine (1) Never done     Advance Directives   What are advance directives? Advance directives are legal documents that state your wishes and plans for medical care  These plans are made ahead of time in case you lose your ability to make decisions for yourself  Advance directives can apply to any medical decision, such as the treatments you want, and if you want to donate organs  What are the types of advance directives?   There are many types of advance directives, and each state has rules about how to use them  You may choose a combination of any of the following:  · Living will: This is a written record of the treatment you want  You can also choose which treatments you do not want, which to limit, and which to stop at a certain time  This includes surgery, medicine, IV fluid, and tube feedings  · Durable power of  for healthcare [place]): This is a written record that states who you want to make healthcare choices for you when you are unable to make them for yourself  This person, called a proxy, is usually a family member or a friend  You may choose more than 1 proxy  · Do not resuscitate (DNR) order:  A DNR order is used in case your heart stops beating or you stop breathing  It is a request not to have certain forms of treatment, such as CPR  A DNR order may be included in other types of advance directives  · Medical directive: This covers the care that you want if you are in a coma, near death, or unable to make decisions for yourself  You can list the treatments you want for each condition  Treatment may include pain medicine, surgery, blood transfusions, dialysis, IV or tube feedings, and a ventilator (breathing machine)  · Values history: This document has questions about your views, beliefs, and how you feel and think about life  This information can help others choose the care that you would choose  Why are advance directives important? An advance directive helps you control your care  Although spoken wishes may be used, it is better to have your wishes written down  Spoken wishes can be misunderstood, or not followed  Treatments may be given even if you do not want them  An advance directive may make it easier for your family to make difficult choices about your care  Fall Prevention    Fall prevention  includes ways to make your home and other areas safer  It also includes ways you can move more carefully to prevent a fall   Health conditions that cause changes in your blood pressure, vision, or muscle strength and coordination may increase your risk for falls  Medicines may also increase your risk for falls if they make you dizzy, weak, or sleepy  Fall prevention tips:   · Stand or sit up slowly  · Use assistive devices as directed  · Wear shoes that fit well and have soles that   · Wear a personal alarm  · Stay active  · Manage your medical conditions  Home Safety Tips:  · Add items to prevent falls in the bathroom  · Keep paths clear  · Install bright lights in your home  · Keep items you use often on shelves within reach  · Paint or place reflective tape on the edges of your stairs  Urinary Incontinence   Urinary incontinence (UI)  is when you lose control of your bladder  UI develops because your bladder cannot store or empty urine properly  The 3 most common types of UI are stress incontinence, urge incontinence, or both  Medicines:   · May be given to help strengthen your bladder control  Report any side effects of medication to your healthcare provider  Do pelvic muscle exercises often:  Your pelvic muscles help you stop urinating  Squeeze these muscles tight for 5 seconds, then relax for 5 seconds  Gradually work up to squeezing for 10 seconds  Do 3 sets of 15 repetitions a day, or as directed  This will help strengthen your pelvic muscles and improve bladder control  Train your bladder:  Go to the bathroom at set times, such as every 2 hours, even if you do not feel the urge to go  You can also try to hold your urine when you feel the urge to go  For example, hold your urine for 5 minutes when you feel the urge to go  As that becomes easier, hold your urine for 10 minutes  Self-care:   · Keep a UI record  Write down how often you leak urine and how much you leak  Make a note of what you were doing when you leaked urine  · Drink liquids as directed   You may need to limit the amount of liquid you drink to help control your urine leakage  Do not drink any liquid right before you go to bed  Limit or do not have drinks that contain caffeine or alcohol  · Prevent constipation  Eat a variety of high-fiber foods  Good examples are high-fiber cereals, beans, vegetables, and whole-grain breads  Walking is the best way to trigger your intestines to have a bowel movement  · Exercise regularly and maintain a healthy weight  Weight loss and exercise will decrease pressure on your bladder and help you control your leakage  · Use a catheter as directed  to help empty your bladder  A catheter is a tiny, plastic tube that is put into your bladder to drain your urine  · Go to behavior therapy as directed  Behavior therapy may be used to help you learn to control your urge to urinate  Weight Management   Why it is important to manage your weight:  Being overweight increases your risk of health conditions such as heart disease, high blood pressure, type 2 diabetes, and certain types of cancer  It can also increase your risk for osteoarthritis, sleep apnea, and other respiratory problems  Aim for a slow, steady weight loss  Even a small amount of weight loss can lower your risk of health problems  How to lose weight safely:  A safe and healthy way to lose weight is to eat fewer calories and get regular exercise  You can lose up about 1 pound a week by decreasing the number of calories you eat by 500 calories each day  Healthy meal plan for weight management:  A healthy meal plan includes a variety of foods, contains fewer calories, and helps you stay healthy  A healthy meal plan includes the following:  · Eat whole-grain foods more often  A healthy meal plan should contain fiber  Fiber is the part of grains, fruits, and vegetables that is not broken down by your body  Whole-grain foods are healthy and provide extra fiber in your diet   Some examples of whole-grain foods are whole-wheat breads and pastas, oatmeal, brown rice, and bulgur  · Eat a variety of vegetables every day  Include dark, leafy greens such as spinach, kale, edwardo greens, and mustard greens  Eat yellow and orange vegetables such as carrots, sweet potatoes, and winter squash  · Eat a variety of fruits every day  Choose fresh or canned fruit (canned in its own juice or light syrup) instead of juice  Fruit juice has very little or no fiber  · Eat low-fat dairy foods  Drink fat-free (skim) milk or 1% milk  Eat fat-free yogurt and low-fat cottage cheese  Try low-fat cheeses such as mozzarella and other reduced-fat cheeses  · Choose meat and other protein foods that are low in fat  Choose beans or other legumes such as split peas or lentils  Choose fish, skinless poultry (chicken or turkey), or lean cuts of red meat (beef or pork)  Before you cook meat or poultry, cut off any visible fat  · Use less fat and oil  Try baking foods instead of frying them  Add less fat, such as margarine, sour cream, regular salad dressing and mayonnaise to foods  Eat fewer high-fat foods  Some examples of high-fat foods include french fries, doughnuts, ice cream, and cakes  · Eat fewer sweets  Limit foods and drinks that are high in sugar  This includes candy, cookies, regular soda, and sweetened drinks  Exercise:  Exercise at least 30 minutes per day on most days of the week  Some examples of exercise include walking, biking, dancing, and swimming  You can also fit in more physical activity by taking the stairs instead of the elevator or parking farther away from stores  Ask your healthcare provider about the best exercise plan for you  © Copyright [employer] 2018 Information is for End User's use only and may not be sold, redistributed or otherwise used for commercial purposes   All illustrations and images included in CareNotes® are the copyrighted property of [employer]  or [facility] Preventive Visit Patient Instructions  Thank you for completing your Welcome to Medicare Visit or Medicare Annual Wellness Visit today  Your next wellness visit will be due in one year (2/15/2023)  The screening/preventive services that you may require over the next 5-10 years are detailed below  Some tests may not apply to you based off risk factors and/or age  Screening tests ordered at today's visit but not completed yet may show as past due  Also, please note that scanned in results may not display below  Preventive Screenings:  Service Recommendations Previous Testing/Comments   Colorectal Cancer Screening  * Colonoscopy    * Fecal Occult Blood Test (FOBT)/Fecal Immunochemical Test (FIT)  * Fecal DNA/Cologuard Test  * Flexible Sigmoidoscopy Age: 54-65 years old   Colonoscopy: every 10 years (may be performed more frequently if at higher risk)  OR  FOBT/FIT: every 1 year  OR  Cologuard: every 3 years  OR  Sigmoidoscopy: every 5 years  Screening may be recommended earlier than age 48 if at higher risk for colorectal cancer  Also, an individualized decision between you and your healthcare provider will decide whether screening between the ages of 74-80 would be appropriate  Colonoscopy: 11/13/2019  FOBT/FIT: Not on file  Cologuard: Not on file  Sigmoidoscopy: Not on file    Screening Current     Breast Cancer Screening Age: 38 years old  Frequency: every 1-2 years  Not required if history of left and right mastectomy Mammogram: 09/24/2018    History Breast Cancer   Cervical Cancer Screening Between the ages of 21-29, pap smear recommended once every 3 years  Between the ages of 33-67, can perform pap smear with HPV co-testing every 5 years     Recommendations may differ for women with a history of total hysterectomy, cervical cancer, or abnormal pap smears in past  Pap Smear: Not on file    Screening Not Indicated   Hepatitis C Screening Once for adults born between [facility]  More frequently in patients at high risk for Hepatitis C Hep C Antibody: Not on file    Screening Current   Diabetes Screening 1-2 times per year if you're at risk for diabetes or have pre-diabetes Fasting glucose: 92 mg/dL   A1C: 5 9 %    Screening Current   Cholesterol Screening Once every 5 years if you don't have a lipid disorder  May order more often based on risk factors  Lipid panel: 02/09/2022    Screening Not Indicated  History Lipid Disorder     Other Preventive Screenings Covered by Medicare:  6  Abdominal Aortic Aneurysm (AAA) Screening: covered once if your at risk  You're considered to be at risk if you have a family history of AAA  7  Lung Cancer Screening: covers low dose CT scan once per year if you meet all of the following conditions: (1) Age 50-69; (2) No signs or symptoms of lung cancer; (3) Current smoker or have quit smoking within the last 15 years; (4) You have a tobacco smoking history of at least 30 pack years (packs per day multiplied by number of years you smoked); (5) You get a written order from a healthcare provider  8  Glaucoma Screening: covered annually if you're considered high risk: (1) You have diabetes OR (2) Family history of glaucoma OR (3)  aged 48 and older OR (3)  American aged 72 and older  5  Osteoporosis Screening: covered every 2 years if you meet one of the following conditions: (1) You're estrogen deficient and at risk for osteoporosis based off medical history and other findings; (2) Have a vertebral abnormality; (3) On glucocorticoid therapy for more than 3 months; (4) Have primary hyperparathyroidism; (5) On osteoporosis medications and need to assess response to drug therapy  · Last bone density test (DXA Scan): 08/14/2014  10  HIV Screening: covered annually if you're between the age of 12-76  Also covered annually if you are younger than 13 and older than 72 with risk factors for HIV infection  For pregnant patients, it is covered up to 3 times per pregnancy      Immunizations:  Immunization Recommendations Influenza Vaccine Annual influenza vaccination during flu season is recommended for all persons aged >= 6 months who do not have contraindications   Pneumococcal Vaccine (Prevnar and Pneumovax)  * Prevnar = PCV13  * Pneumovax = PPSV23   Adults 25-60 years old: 1-3 doses may be recommended based on certain risk factors  Adults 72 years old: Prevnar (PCV13) vaccine recommended followed by Pneumovax (PPSV23) vaccine  If already received PPSV23 since turning 65, then PCV13 recommended at least one year after PPSV23 dose  Hepatitis B Vaccine 3 dose series if at intermediate or high risk (ex: diabetes, end stage renal disease, liver disease)   Tetanus (Td) Vaccine - COST NOT COVERED BY MEDICARE PART B Following completion of primary series, a booster dose should be given every 10 years to maintain immunity against tetanus  Td may also be given as tetanus wound prophylaxis  Tdap Vaccine - COST NOT COVERED BY MEDICARE PART B Recommended at least once for all adults  For pregnant patients, recommended with each pregnancy  Shingles Vaccine (Shingrix) - COST NOT COVERED BY MEDICARE PART B  2 shot series recommended in those aged 48 and above     Health Maintenance Due:      Topic Date Due    Breast Cancer Screening: Mammogram  09/24/2020    Colorectal Cancer Screening  11/13/2024    Hepatitis C Screening  Addressed     Immunizations Due:      Topic Date Due    Influenza Vaccine (1) Never done     Advance Directives   What are advance directives? Advance directives are legal documents that state your wishes and plans for medical care  These plans are made ahead of time in case you lose your ability to make decisions for yourself  Advance directives can apply to any medical decision, such as the treatments you want, and if you want to donate organs  What are the types of advance directives? There are many types of advance directives, and each state has rules about how to use them   You may choose a combination of any of the following:  · Living will: This is a written record of the treatment you want  You can also choose which treatments you do not want, which to limit, and which to stop at a certain time  This includes surgery, medicine, IV fluid, and tube feedings  · Durable power of  for healthcare [place] [facility]): This is a written record that states who you want to make healthcare choices for you when you are unable to make them for yourself  This person, called a proxy, is usually a family member or a friend  You may choose more than 1 proxy  · Do not resuscitate (DNR) order:  A DNR order is used in case your heart stops beating or you stop breathing  It is a request not to have certain forms of treatment, such as CPR  A DNR order may be included in other types of advance directives  · Medical directive: This covers the care that you want if you are in a coma, near death, or unable to make decisions for yourself  You can list the treatments you want for each condition  Treatment may include pain medicine, surgery, blood transfusions, dialysis, IV or tube feedings, and a ventilator (breathing machine)  · Values history: This document has questions about your views, beliefs, and how you feel and think about life  This information can help others choose the care that you would choose  Why are advance directives important? An advance directive helps you control your care  Although spoken wishes may be used, it is better to have your wishes written down  Spoken wishes can be misunderstood, or not followed  Treatments may be given even if you do not want them  An advance directive may make it easier for your family to make difficult choices about your care  Fall Prevention    Fall prevention  includes ways to make your home and other areas safer  It also includes ways you can move more carefully to prevent a fall   Health conditions that cause changes in your blood pressure, vision, or muscle strength and coordination may increase your risk for falls  Medicines may also increase your risk for falls if they make you dizzy, weak, or sleepy  Fall prevention tips:   · Stand or sit up slowly  · Use assistive devices as directed  · Wear shoes that fit well and have soles that   · Wear a personal alarm  · Stay active  · Manage your medical conditions  Home Safety Tips:  · Add items to prevent falls in the bathroom  · Keep paths clear  · Install bright lights in your home  · Keep items you use often on shelves within reach  · Paint or place reflective tape on the edges of your stairs  Urinary Incontinence   Urinary incontinence (UI)  is when you lose control of your bladder  UI develops because your bladder cannot store or empty urine properly  The 3 most common types of UI are stress incontinence, urge incontinence, or both  Medicines:   · May be given to help strengthen your bladder control  Report any side effects of medication to your healthcare provider  Do pelvic muscle exercises often:  Your pelvic muscles help you stop urinating  Squeeze these muscles tight for 5 seconds, then relax for 5 seconds  Gradually work up to squeezing for 10 seconds  Do 3 sets of 15 repetitions a day, or as directed  This will help strengthen your pelvic muscles and improve bladder control  Train your bladder:  Go to the bathroom at set times, such as every 2 hours, even if you do not feel the urge to go  You can also try to hold your urine when you feel the urge to go  For example, hold your urine for 5 minutes when you feel the urge to go  As that becomes easier, hold your urine for 10 minutes  Self-care:   · Keep a UI record  Write down how often you leak urine and how much you leak  Make a note of what you were doing when you leaked urine  · Drink liquids as directed  You may need to limit the amount of liquid you drink to help control your urine leakage   Do not drink any liquid right before you go to bed  Limit or do not have drinks that contain caffeine or alcohol  · Prevent constipation  Eat a variety of high-fiber foods  Good examples are high-fiber cereals, beans, vegetables, and whole-grain breads  Walking is the best way to trigger your intestines to have a bowel movement  · Exercise regularly and maintain a healthy weight  Weight loss and exercise will decrease pressure on your bladder and help you control your leakage  · Use a catheter as directed  to help empty your bladder  A catheter is a tiny, plastic tube that is put into your bladder to drain your urine  · Go to behavior therapy as directed  Behavior therapy may be used to help you learn to control your urge to urinate  Weight Management   Why it is important to manage your weight:  Being overweight increases your risk of health conditions such as heart disease, high blood pressure, type 2 diabetes, and certain types of cancer  It can also increase your risk for osteoarthritis, sleep apnea, and other respiratory problems  Aim for a slow, steady weight loss  Even a small amount of weight loss can lower your risk of health problems  How to lose weight safely:  A safe and healthy way to lose weight is to eat fewer calories and get regular exercise  You can lose up about 1 pound a week by decreasing the number of calories you eat by 500 calories each day  Healthy meal plan for weight management:  A healthy meal plan includes a variety of foods, contains fewer calories, and helps you stay healthy  A healthy meal plan includes the following:  · Eat whole-grain foods more often  A healthy meal plan should contain fiber  Fiber is the part of grains, fruits, and vegetables that is not broken down by your body  Whole-grain foods are healthy and provide extra fiber in your diet  Some examples of whole-grain foods are whole-wheat breads and pastas, oatmeal, brown rice, and bulgur  · Eat a variety of vegetables every day    Include dark, leafy greens such as spinach, kale, edwardo greens, and mustard greens  Eat yellow and orange vegetables such as carrots, sweet potatoes, and winter squash  · Eat a variety of fruits every day  Choose fresh or canned fruit (canned in its own juice or light syrup) instead of juice  Fruit juice has very little or no fiber  · Eat low-fat dairy foods  Drink fat-free (skim) milk or 1% milk  Eat fat-free yogurt and low-fat cottage cheese  Try low-fat cheeses such as mozzarella and other reduced-fat cheeses  · Choose meat and other protein foods that are low in fat  Choose beans or other legumes such as split peas or lentils  Choose fish, skinless poultry (chicken or turkey), or lean cuts of red meat (beef or pork)  Before you cook meat or poultry, cut off any visible fat  · Use less fat and oil  Try baking foods instead of frying them  Add less fat, such as margarine, sour cream, regular salad dressing and mayonnaise to foods  Eat fewer high-fat foods  Some examples of high-fat foods include french fries, doughnuts, ice cream, and cakes  · Eat fewer sweets  Limit foods and drinks that are high in sugar  This includes candy, cookies, regular soda, and sweetened drinks  Exercise:  Exercise at least 30 minutes per day on most days of the week  Some examples of exercise include walking, biking, dancing, and swimming  You can also fit in more physical activity by taking the stairs instead of the elevator or parking farther away from stores  Ask your healthcare provider about the best exercise plan for you  © Copyright [employer] 2018 Information is for End User's use only and may not be sold, redistributed or otherwise used for commercial purposes   All illustrations and images included in CareNotes® are the copyrighted property of A D A M , Inc  or [address] [place] [address]

## 2022-03-17 ENCOUNTER — TELEPHONE (OUTPATIENT)
Dept: FAMILY MEDICINE CLINIC | Facility: CLINIC | Age: 77
End: 2022-03-17

## 2022-03-17 NOTE — TELEPHONE ENCOUNTER
Voicemail left on patients phone as she's due to a mammogram- instructions given to patient to call our office for ordering and scheduling

## 2022-04-02 DIAGNOSIS — I10 ESSENTIAL HYPERTENSION: ICD-10-CM

## 2022-04-03 DIAGNOSIS — E03.9 HYPOTHYROIDISM, UNSPECIFIED TYPE: ICD-10-CM

## 2022-04-03 RX ORDER — LOSARTAN POTASSIUM 50 MG/1
TABLET ORAL
Qty: 90 TABLET | Refills: 0 | Status: SHIPPED | OUTPATIENT
Start: 2022-04-03 | End: 2022-07-09

## 2022-04-03 RX ORDER — HYDROCHLOROTHIAZIDE 12.5 MG/1
TABLET ORAL
Qty: 90 TABLET | Refills: 0 | Status: SHIPPED | OUTPATIENT
Start: 2022-04-03 | End: 2022-07-09

## 2022-04-04 RX ORDER — LEVOTHYROXINE AND LIOTHYRONINE 57; 13.5 UG/1; UG/1
90 TABLET ORAL DAILY
Qty: 90 TABLET | Refills: 3 | Status: SHIPPED | OUTPATIENT
Start: 2022-04-04

## 2022-04-04 NOTE — TELEPHONE ENCOUNTER
From: Juancho Blancas  To: Office of Juliet Bashir MD  Sent: 4/3/2022 5:39 PM EDT  Subject: Medication Renewal Request    Refills have been requested for the following medications:    Other - thyroid 90 MG tablet    Preferred pharmacy: [address] [place] [address]

## 2022-04-05 ENCOUNTER — TELEPHONE (OUTPATIENT)
Dept: FAMILY MEDICINE CLINIC | Facility: CLINIC | Age: 77
End: 2022-04-05

## 2022-04-06 NOTE — TELEPHONE ENCOUNTER
Please call prior authorization department of [facility] and explain to them that patient has been on this medication for several years without problems    If they need me to call I will do so

## 2022-04-06 NOTE — TELEPHONE ENCOUNTER
Called [facility] - notified them of your message  They said that this medication is not covered due to 2 reasons that were already provided - not being covered under her specific plan and the medication being high-risk for the patient's age  They were willing to submit another prior auth with additional information and will fax us the result within 72 hours      Reference #: [id number]

## 2022-04-07 NOTE — TELEPHONE ENCOUNTER
Patient left message on med line stating that she is completely out of her thyroid medication  Called patient back and updated her on the PA situation - patient stated she usually pays out of pocket for this medication anyways  I will submit a refill for her for this medication and we are to call patient back once a decision is made about the PA

## 2022-04-15 DIAGNOSIS — J45.909 UNCOMPLICATED ASTHMA, UNSPECIFIED ASTHMA SEVERITY, UNSPECIFIED WHETHER PERSISTENT: ICD-10-CM

## 2022-04-15 RX ORDER — BUDESONIDE AND FORMOTEROL FUMARATE DIHYDRATE 160; 4.5 UG/1; UG/1
AEROSOL RESPIRATORY (INHALATION)
Qty: 11 G | Refills: 0 | Status: SHIPPED | OUTPATIENT
Start: 2022-04-15

## 2022-06-15 NOTE — TELEPHONE ENCOUNTER
rf methylprednisone   naz 611   (the first script was in the wrong chart) [facility] Pregnancy And Lactation Text: This medication is Pregnancy Category C and isn't considered safe during pregnancy. It is excreted in breast milk.

## 2022-07-09 DIAGNOSIS — I10 ESSENTIAL HYPERTENSION: ICD-10-CM

## 2022-07-09 RX ORDER — LOSARTAN POTASSIUM 50 MG/1
TABLET ORAL
Qty: 90 TABLET | Refills: 0 | Status: SHIPPED | OUTPATIENT
Start: 2022-07-09 | End: 2022-10-08 | Stop reason: SDUPTHER

## 2022-07-09 RX ORDER — HYDROCHLOROTHIAZIDE 12.5 MG/1
TABLET ORAL
Qty: 90 TABLET | Refills: 0 | Status: SHIPPED | OUTPATIENT
Start: 2022-07-09 | End: 2022-10-08 | Stop reason: SDUPTHER

## 2022-08-09 ENCOUNTER — RA CDI HCC (OUTPATIENT)
Dept: OTHER | Facility: HOSPITAL | Age: 77
End: 2022-08-09

## 2022-08-09 NOTE — PROGRESS NOTES
[address]  coding opportunities       Chart reviewed, no opportunity found: CHART REVIEWED, NO OPPORTUNITY FOUND        Patients Insurance     Medicare Insurance: Medicare

## 2022-08-15 ENCOUNTER — TELEPHONE (OUTPATIENT)
Dept: FAMILY MEDICINE CLINIC | Facility: CLINIC | Age: 77
End: 2022-08-15

## 2022-08-15 DIAGNOSIS — D64.9 ANEMIA, UNSPECIFIED TYPE: ICD-10-CM

## 2022-08-15 DIAGNOSIS — N18.2 STAGE 2 CHRONIC KIDNEY DISEASE: ICD-10-CM

## 2022-08-15 DIAGNOSIS — E03.9 HYPOTHYROIDISM, UNSPECIFIED TYPE: Primary | ICD-10-CM

## 2022-08-15 DIAGNOSIS — E79.0 HYPERURICEMIA: ICD-10-CM

## 2022-08-15 DIAGNOSIS — M10.9 GOUT, UNSPECIFIED CAUSE, UNSPECIFIED CHRONICITY, UNSPECIFIED SITE: ICD-10-CM

## 2022-08-15 DIAGNOSIS — R73.03 PREDIABETES: ICD-10-CM

## 2022-08-15 DIAGNOSIS — E78.5 HYPERLIPIDEMIA, UNSPECIFIED HYPERLIPIDEMIA TYPE: ICD-10-CM

## 2022-08-15 NOTE — TELEPHONE ENCOUNTER
Pt walked in the office - she went to the lab to have blood drawn and there aren't any orders in for her upcoming appt - please place blood orders in chart for pt

## 2022-08-17 ENCOUNTER — APPOINTMENT (OUTPATIENT)
Dept: LAB | Facility: HOSPITAL | Age: 77
End: 2022-08-17
Payer: MEDICARE

## 2022-08-17 DIAGNOSIS — D64.9 ANEMIA, UNSPECIFIED TYPE: ICD-10-CM

## 2022-08-17 DIAGNOSIS — E78.5 HYPERLIPIDEMIA, UNSPECIFIED HYPERLIPIDEMIA TYPE: ICD-10-CM

## 2022-08-17 DIAGNOSIS — E03.9 HYPOTHYROIDISM, UNSPECIFIED TYPE: ICD-10-CM

## 2022-08-17 DIAGNOSIS — R73.03 PREDIABETES: ICD-10-CM

## 2022-08-17 DIAGNOSIS — E79.0 HYPERURICEMIA: ICD-10-CM

## 2022-08-17 LAB
ALBUMIN SERPL BCP-MCNC: 4.2 G/DL (ref 3.5–5)
ALP SERPL-CCNC: 58 U/L (ref 46–116)
ALT SERPL W P-5'-P-CCNC: 30 U/L (ref 12–78)
ANION GAP SERPL CALCULATED.3IONS-SCNC: 11 MMOL/L (ref 4–13)
AST SERPL W P-5'-P-CCNC: 24 U/L (ref 5–45)
BASOPHILS # BLD AUTO: 0.08 THOUSANDS/ΜL (ref 0–0.1)
BASOPHILS NFR BLD AUTO: 2 % (ref 0–1)
BILIRUB SERPL-MCNC: 0.3 MG/DL (ref 0.2–1)
BUN SERPL-MCNC: 22 MG/DL (ref 5–25)
CALCIUM SERPL-MCNC: 9.2 MG/DL (ref 8.3–10.1)
CHLORIDE SERPL-SCNC: 104 MMOL/L (ref 96–108)
CHOLEST SERPL-MCNC: 205 MG/DL
CO2 SERPL-SCNC: 27 MMOL/L (ref 21–32)
CREAT SERPL-MCNC: 0.99 MG/DL (ref 0.6–1.3)
EOSINOPHIL # BLD AUTO: 0.65 THOUSAND/ΜL (ref 0–0.61)
EOSINOPHIL NFR BLD AUTO: 13 % (ref 0–6)
ERYTHROCYTE [DISTWIDTH] IN BLOOD BY AUTOMATED COUNT: 13.7 % (ref 11.6–15.1)
EST. AVERAGE GLUCOSE BLD GHB EST-MCNC: 126 MG/DL
GFR SERPL CREATININE-BSD FRML MDRD: 55 ML/MIN/1.73SQ M
GLUCOSE P FAST SERPL-MCNC: 99 MG/DL (ref 65–99)
HBA1C MFR BLD: 6 %
HCT VFR BLD AUTO: 39.6 % (ref 34.8–46.1)
HDLC SERPL-MCNC: 102 MG/DL
HGB BLD-MCNC: 13 G/DL (ref 11.5–15.4)
IMM GRANULOCYTES # BLD AUTO: 0.03 THOUSAND/UL (ref 0–0.2)
IMM GRANULOCYTES NFR BLD AUTO: 1 % (ref 0–2)
LDLC SERPL CALC-MCNC: 78 MG/DL (ref 0–100)
LYMPHOCYTES # BLD AUTO: 1.83 THOUSANDS/ΜL (ref 0.6–4.47)
LYMPHOCYTES NFR BLD AUTO: 36 % (ref 14–44)
MCH RBC QN AUTO: 32.3 PG (ref 26.8–34.3)
MCHC RBC AUTO-ENTMCNC: 32.8 G/DL (ref 31.4–37.4)
MCV RBC AUTO: 99 FL (ref 82–98)
MONOCYTES # BLD AUTO: 0.29 THOUSAND/ΜL (ref 0.17–1.22)
MONOCYTES NFR BLD AUTO: 6 % (ref 4–12)
NEUTROPHILS # BLD AUTO: 2.23 THOUSANDS/ΜL (ref 1.85–7.62)
NEUTS SEG NFR BLD AUTO: 42 % (ref 43–75)
NONHDLC SERPL-MCNC: 103 MG/DL
NRBC BLD AUTO-RTO: 0 /100 WBCS
PLATELET # BLD AUTO: 269 THOUSANDS/UL (ref 149–390)
PMV BLD AUTO: 9.5 FL (ref 8.9–12.7)
POTASSIUM SERPL-SCNC: 3.8 MMOL/L (ref 3.5–5.3)
PROT SERPL-MCNC: 7.9 G/DL (ref 6.4–8.4)
RBC # BLD AUTO: 4.02 MILLION/UL (ref 3.81–5.12)
SODIUM SERPL-SCNC: 142 MMOL/L (ref 135–147)
TRIGL SERPL-MCNC: 127 MG/DL
TSH SERPL DL<=0.05 MIU/L-ACNC: 1.26 UIU/ML (ref 0.45–4.5)
URATE SERPL-MCNC: 9.3 MG/DL (ref 2–7.5)
WBC # BLD AUTO: 5.11 THOUSAND/UL (ref 4.31–10.16)

## 2022-08-17 PROCEDURE — 83036 HEMOGLOBIN GLYCOSYLATED A1C: CPT

## 2022-08-17 PROCEDURE — 80061 LIPID PANEL: CPT

## 2022-08-17 PROCEDURE — 36415 COLL VENOUS BLD VENIPUNCTURE: CPT

## 2022-08-17 PROCEDURE — 84443 ASSAY THYROID STIM HORMONE: CPT

## 2022-08-17 PROCEDURE — 80053 COMPREHEN METABOLIC PANEL: CPT

## 2022-08-17 PROCEDURE — 85025 COMPLETE CBC W/AUTO DIFF WBC: CPT

## 2022-08-17 PROCEDURE — 84550 ASSAY OF BLOOD/URIC ACID: CPT

## 2022-08-29 ENCOUNTER — OFFICE VISIT (OUTPATIENT)
Dept: FAMILY MEDICINE CLINIC | Facility: CLINIC | Age: 77
End: 2022-08-29
Payer: MEDICARE

## 2022-08-29 VITALS
SYSTOLIC BLOOD PRESSURE: 124 MMHG | HEART RATE: 79 BPM | WEIGHT: 146 LBS | BODY MASS INDEX: 24.92 KG/M2 | DIASTOLIC BLOOD PRESSURE: 84 MMHG | TEMPERATURE: 97.5 F | OXYGEN SATURATION: 96 % | HEIGHT: 64 IN

## 2022-08-29 DIAGNOSIS — I10 PRIMARY HYPERTENSION: Primary | ICD-10-CM

## 2022-08-29 DIAGNOSIS — M25.512 BILATERAL SHOULDER PAIN, UNSPECIFIED CHRONICITY: ICD-10-CM

## 2022-08-29 DIAGNOSIS — J45.909 UNCOMPLICATED ASTHMA, UNSPECIFIED ASTHMA SEVERITY, UNSPECIFIED WHETHER PERSISTENT: ICD-10-CM

## 2022-08-29 DIAGNOSIS — E78.5 HYPERLIPIDEMIA, UNSPECIFIED HYPERLIPIDEMIA TYPE: ICD-10-CM

## 2022-08-29 DIAGNOSIS — M25.511 BILATERAL SHOULDER PAIN, UNSPECIFIED CHRONICITY: ICD-10-CM

## 2022-08-29 DIAGNOSIS — R73.03 PREDIABETES: ICD-10-CM

## 2022-08-29 DIAGNOSIS — M19.90 ARTHRITIS: ICD-10-CM

## 2022-08-29 DIAGNOSIS — N18.31 STAGE 3A CHRONIC KIDNEY DISEASE (HCC): ICD-10-CM

## 2022-08-29 DIAGNOSIS — E03.9 HYPOTHYROIDISM, UNSPECIFIED TYPE: ICD-10-CM

## 2022-08-29 PROCEDURE — 99214 OFFICE O/P EST MOD 30 MIN: CPT | Performed by: FAMILY MEDICINE

## 2022-08-29 RX ORDER — METHYLPREDNISOLONE 4 MG/1
TABLET ORAL
Qty: 21 EACH | Refills: 0 | Status: SHIPPED | OUTPATIENT
Start: 2022-08-29 | End: 2022-10-13 | Stop reason: SDUPTHER

## 2022-08-29 RX ORDER — IBUPROFEN 400 MG/1
400 TABLET ORAL 3 TIMES DAILY PRN
Qty: 90 TABLET | Refills: 1 | Status: SHIPPED | OUTPATIENT
Start: 2022-08-29

## 2022-08-29 NOTE — PROGRESS NOTES
Depression Screening and Follow-up Plan: Patient was screened for depression during today's encounter  They screened negative with a PHQ-2 score of 0  Falls Plan of Care: balance, strength, and gait training instructions were provided  Assessment/Plan:    Return visit 6 months fasting blood prior to visit     Problem List Items Addressed This Visit        Endocrine    Hypothyroidism     Continue [place] Thyroid 90 mg daily         Relevant Medications    methylPREDNISolone 4 MG tablet therapy pack    Other Relevant Orders    TSH, 3rd generation with Free T4 reflex       Respiratory    Asthma    Relevant Medications    Trelegy Ellipta 100-62 5-25 MCG/INH inhaler       Cardiovascular and Mediastinum    Hypertension - Primary       Musculoskeletal and Integument    Arthritis    Relevant Medications    ibuprofen (MOTRIN) 400 mg tablet       Genitourinary    Stage 3a chronic kidney disease (HCC)     Lab Results   Component Value Date    EGFR 55 08/17/2022    EGFR 61 02/09/2022    EGFR 62 12/02/2020    CREATININE 0 99 08/17/2022    CREATININE 0 92 02/09/2022    CREATININE 0 92 12/02/2020   Minimize NSAID use         Relevant Orders    Microalbumin / creatinine urine ratio    UA (URINE) with reflex to Scope    CBC and differential    Uric acid       Other    Hyperlipidemia     Continue Nexlatol  Discontinue Vascepa         Relevant Orders    Comprehensive metabolic panel    Lipid panel    Prediabetes    Relevant Orders    Hemoglobin A1C    Bilateral shoulder pain    Relevant Medications    methylPREDNISolone 4 MG tablet therapy pack            Subjective:      Patient ID: Darryn Pyle is a 78 y o  female  Patient comes in for checkup  She complains of chronic bilateral shoulder pain  She is seeing Orthopedics for this  She continues to see cardiology who has added Netlatol to her cholesterol regimen        The following portions of the patient's history were reviewed and updated as appropriate:   Past Medical History:  She has a past medical history of Asthma and Disease of thyroid gland  ,  _______________________________________________________________________  Medical Problems:  does not have any pertinent problems on file ,  _______________________________________________________________________  Past Surgical History:   has a past surgical history that includes Mastectomy; Hand surgery (Right); Breast biopsy; Lumbar laminectomy; and Neck surgery  ,  _______________________________________________________________________  Family History:  family history includes Cancer in her father and mother; Diabetes in her mother; Heart disease in her mother ,  _______________________________________________________________________  Social History:   reports that she quit smoking about 35 years ago  She has never used smokeless tobacco  She reports current alcohol use  She reports current drug use  Drug: Marijuana  ,  _______________________________________________________________________  Allergies:  is allergic to penicillins, amoxicillin, cefprozil, cephalexin, ciprofloxacin, doxycycline, erythromycin, and levofloxacin     _______________________________________________________________________  Current Outpatient Medications   Medication Sig Dispense Refill    cetirizine (ZyrTEC) 10 mg tablet Take 10 mg by mouth daily      EPINEPHrine (EpiPen 2-Ever) 0 3 mg/0 3 mL SOAJ Inject 0 3 mL (0 3 mg total) into a muscle once for 1 dose 0 6 mL 1    hydrochlorothiazide (HYDRODIURIL) 12 5 mg tablet Take 1 tablet by mouth once daily 90 tablet 0    ibuprofen (MOTRIN) 400 mg tablet Take 1 tablet (400 mg total) by mouth 3 (three) times a day as needed for mild pain 90 tablet 1    losartan (COZAAR) 50 mg tablet Take 1 tablet by mouth once daily 90 tablet 0    methylPREDNISolone 4 MG tablet therapy pack Use as directed on package 21 each 0    Nexletol 180 MG TABS Take 1 tablet by mouth every morning      thyroid ([place] Thyroid) 90 MG tablet Take 1 tablet (90 mg total) by mouth daily 90 tablet 3    Trelegy Ellipta 100-62 5-25 MCG/INH inhaler       triamcinolone (KENALOG) 0 5 % cream Apply topically 3 (three) times a day 30 g 5     No current facility-administered medications for this visit      _______________________________________________________________________  Review of Systems   Constitutional: Negative  Respiratory: Negative  Cardiovascular: Negative  Musculoskeletal: Positive for arthralgias  Objective:  Vitals:    08/29/22 0828   BP: 124/84   Pulse: 79   Temp: 97 5 °F (36 4 °C)   SpO2: 96%   Weight: 66 2 kg (146 lb)   Height: 5' 4" (1 626 m)     Body mass index is 25 06 kg/m²  Physical Exam  Constitutional:       Appearance: Normal appearance  She is well-developed  HENT:      Head: Normocephalic and atraumatic  Eyes:      Pupils: Pupils are equal, round, and reactive to light  Neck:      Thyroid: No thyromegaly  Cardiovascular:      Rate and Rhythm: Normal rate and regular rhythm  Heart sounds: Normal heart sounds  Pulmonary:      Effort: Pulmonary effort is normal       Breath sounds: Normal breath sounds  Abdominal:      Palpations: Abdomen is soft  Musculoskeletal:         General: Normal range of motion  Cervical back: Neck supple  Lymphadenopathy:      Cervical: No cervical adenopathy  Skin:     General: Skin is warm and dry  Neurological:      Mental Status: She is alert     Psychiatric:         Mood and Affect: Mood normal          Behavior: Behavior normal

## 2022-08-29 NOTE — ASSESSMENT & PLAN NOTE
Lab Results   Component Value Date    EGFR 55 08/17/2022    EGFR 61 02/09/2022    EGFR 62 12/02/2020    CREATININE 0 99 08/17/2022    CREATININE 0 92 02/09/2022    CREATININE 0 92 12/02/2020   Minimize NSAID use

## 2022-10-08 DIAGNOSIS — I10 ESSENTIAL HYPERTENSION: ICD-10-CM

## 2022-10-10 RX ORDER — LOSARTAN POTASSIUM 50 MG/1
50 TABLET ORAL DAILY
Qty: 90 TABLET | Refills: 0 | Status: SHIPPED | OUTPATIENT
Start: 2022-10-10

## 2022-10-10 RX ORDER — HYDROCHLOROTHIAZIDE 12.5 MG/1
12.5 TABLET ORAL DAILY
Qty: 90 TABLET | Refills: 0 | Status: SHIPPED | OUTPATIENT
Start: 2022-10-10

## 2022-10-10 RX ORDER — CYCLOSPORINE 0.5 MG/ML
EMULSION OPHTHALMIC
COMMUNITY
Start: 2022-09-13

## 2022-10-13 ENCOUNTER — OFFICE VISIT (OUTPATIENT)
Dept: FAMILY MEDICINE CLINIC | Facility: CLINIC | Age: 77
End: 2022-10-13
Payer: MEDICARE

## 2022-10-13 VITALS
OXYGEN SATURATION: 97 % | BODY MASS INDEX: 25.1 KG/M2 | DIASTOLIC BLOOD PRESSURE: 72 MMHG | HEIGHT: 64 IN | WEIGHT: 147 LBS | TEMPERATURE: 98.7 F | HEART RATE: 86 BPM | SYSTOLIC BLOOD PRESSURE: 124 MMHG

## 2022-10-13 DIAGNOSIS — M25.512 BILATERAL SHOULDER PAIN, UNSPECIFIED CHRONICITY: ICD-10-CM

## 2022-10-13 DIAGNOSIS — M25.511 BILATERAL SHOULDER PAIN, UNSPECIFIED CHRONICITY: ICD-10-CM

## 2022-10-13 DIAGNOSIS — S83.90XA: ICD-10-CM

## 2022-10-13 DIAGNOSIS — M25.40 JOINT SWELLING: Primary | ICD-10-CM

## 2022-10-13 PROCEDURE — 20610 DRAIN/INJ JOINT/BURSA W/O US: CPT | Performed by: STUDENT IN AN ORGANIZED HEALTH CARE EDUCATION/TRAINING PROGRAM

## 2022-10-13 PROCEDURE — 87205 SMEAR GRAM STAIN: CPT | Performed by: STUDENT IN AN ORGANIZED HEALTH CARE EDUCATION/TRAINING PROGRAM

## 2022-10-13 PROCEDURE — 99213 OFFICE O/P EST LOW 20 MIN: CPT | Performed by: STUDENT IN AN ORGANIZED HEALTH CARE EDUCATION/TRAINING PROGRAM

## 2022-10-13 PROCEDURE — 87070 CULTURE OTHR SPECIMN AEROBIC: CPT | Performed by: STUDENT IN AN ORGANIZED HEALTH CARE EDUCATION/TRAINING PROGRAM

## 2022-10-13 RX ORDER — METHYLPREDNISOLONE 4 MG/1
TABLET ORAL
Qty: 21 EACH | Refills: 0 | Status: SHIPPED | OUTPATIENT
Start: 2022-10-13

## 2022-10-13 NOTE — PROGRESS NOTES
Assessment/Plan:         Problem List Items Addressed This Visit        Other    Bilateral shoulder pain    Relevant Medications    methylPREDNISolone 4 MG tablet therapy pack      Other Visit Diagnoses     Joint swelling    -  Primary    Relevant Orders    Body fluid culture and Gram stain    Large joint arthrocentesis: L supra patellar bursa (Completed)    Traumatic hemarthrosis of knee, initial encounter        Relevant Orders    Large joint arthrocentesis: L supra patellar bursa (Completed)        Wrapped  Anti inflammatory sent in  If no to minimal improvement, will have her see sports medicine     Subjective:      Patient ID: Ronnie Garcia is a 78 y o  female  HPI  '  Patient coming in for R knee swelling  On medial aspect of knee  Started 5 days ago  Prior to this, she had a mechanical fall  Does take NSAIDs occasionally  Denies any pain or movement restriction     The following portions of the patient's history were reviewed and updated as appropriate:   Past Medical History:  She has a past medical history of Asthma and Disease of thyroid gland  ,  _______________________________________________________________________  Medical Problems:  does not have any pertinent problems on file ,  _______________________________________________________________________  Past Surgical History:   has a past surgical history that includes Mastectomy; Hand surgery (Right); Breast biopsy; Lumbar laminectomy; and Neck surgery  ,  _______________________________________________________________________  Family History:  family history includes Cancer in her father and mother; Diabetes in her mother; Heart disease in her mother ,  _______________________________________________________________________  Social History:   reports that she quit smoking about 35 years ago  She has never used smokeless tobacco  She reports current alcohol use  She reports current drug use   Drug: Marijuana  ,  _______________________________________________________________________  Allergies:  is allergic to penicillins, amoxicillin, cefprozil, cephalexin, ciprofloxacin, doxycycline, erythromycin, and levofloxacin     _______________________________________________________________________  Current Outpatient Medications   Medication Sig Dispense Refill   • cetirizine (ZyrTEC) 10 mg tablet Take 10 mg by mouth daily     • hydrochlorothiazide (HYDRODIURIL) 12 5 mg tablet Take 1 tablet (12 5 mg total) by mouth daily 90 tablet 0   • ibuprofen (MOTRIN) 400 mg tablet Take 1 tablet (400 mg total) by mouth 3 (three) times a day as needed for mild pain 90 tablet 1   • losartan (COZAAR) 50 mg tablet Take 1 tablet (50 mg total) by mouth daily 90 tablet 0   • methylPREDNISolone 4 MG tablet therapy pack Use as directed on package 21 each 0   • Nexletol 180 MG TABS Take 1 tablet by mouth every morning     • Restasis 0 05 % ophthalmic emulsion INSTILL 1 DROP INTO EACH EYE TWICE DAILY     • thyroid ([place] Thyroid) 90 MG tablet Take 1 tablet (90 mg total) by mouth daily 90 tablet 3   • Trelegy Ellipta 100-62 5-25 MCG/INH inhaler      • triamcinolone (KENALOG) 0 5 % cream Apply topically 3 (three) times a day 30 g 5   • EPINEPHrine (EpiPen 2-Ever) 0 3 mg/0 3 mL SOAJ Inject 0 3 mL (0 3 mg total) into a muscle once for 1 dose 0 6 mL 1     No current facility-administered medications for this visit      _______________________________________________________________________  Review of Systems   Musculoskeletal: Positive for joint swelling  Negative for arthralgias and gait problem  Skin: Negative for color change, rash and wound  Objective:  Vitals:    10/13/22 1316   BP: 124/72   BP Location: Left arm   Patient Position: Sitting   Cuff Size: Adult   Pulse: 86   Temp: 98 7 °F (37 1 °C)   SpO2: 97%   Weight: 66 7 kg (147 lb)   Height: 5' 4" (1 626 m)     Body mass index is 25 23 kg/m²       Physical Exam  Constitutional: Appearance: Normal appearance  HENT:      Head: Normocephalic and atraumatic  Musculoskeletal:      Right knee: Effusion present  No swelling, deformity, erythema, ecchymosis, lacerations, bony tenderness or crepitus  Normal range of motion  No tenderness  No LCL laxity, MCL laxity, ACL laxity or PCL laxity  Normal alignment, normal meniscus and normal patellar mobility  Comments: Medial knee subcutaneous swelling    Neurological:      Mental Status: She is alert  Large joint arthrocentesis: L supra patellar bursa  Universal Protocol:  Time out: Immediately prior to procedure a "time out" was called to verify the correct patient, procedure, equipment, support staff and site/side marked as required    Timeout called at: 10/13/2022 1:20 PM   Patient understanding: patient states understanding of the procedure being performed  Test results: test results available and properly labeled  Required items: required blood products, implants, devices, and special equipment available  Patient identity confirmed: verbally with patient    Supporting Documentation  Indications: joint swelling   Procedure Details  Location: knee - L supra patellar bursa  Preparation: Patient was prepped and draped in the usual sterile fashion  Needle size: 20 G  Ultrasound guidance: no  Approach: anteromedial    Aspirate: blood-tinged  Analysis: fluid sample sent for laboratory analysis    Patient tolerance: patient tolerated the procedure well with no immediate complications  Dressing:  Sterile dressing applied    Ethyl chloride applied to provide topical anesthetic

## 2022-10-16 LAB
BACTERIA SPEC BFLD CULT: NO GROWTH
GRAM STN SPEC: NORMAL

## 2022-10-31 ENCOUNTER — OFFICE VISIT (OUTPATIENT)
Dept: FAMILY MEDICINE CLINIC | Facility: CLINIC | Age: 77
End: 2022-10-31

## 2022-10-31 ENCOUNTER — HOSPITAL ENCOUNTER (OUTPATIENT)
Dept: RADIOLOGY | Facility: HOSPITAL | Age: 77
Discharge: HOME | End: 2022-10-31

## 2022-10-31 VITALS
DIASTOLIC BLOOD PRESSURE: 64 MMHG | WEIGHT: 149 LBS | SYSTOLIC BLOOD PRESSURE: 128 MMHG | HEART RATE: 84 BPM | HEIGHT: 64 IN | BODY MASS INDEX: 25.44 KG/M2 | TEMPERATURE: 97.2 F | OXYGEN SATURATION: 96 %

## 2022-10-31 DIAGNOSIS — I10 PRIMARY HYPERTENSION: ICD-10-CM

## 2022-10-31 DIAGNOSIS — E03.9 HYPOTHYROIDISM, UNSPECIFIED TYPE: ICD-10-CM

## 2022-10-31 DIAGNOSIS — M70.41 PREPATELLAR BURSITIS OF RIGHT KNEE: Primary | ICD-10-CM

## 2022-10-31 DIAGNOSIS — J45.909 UNCOMPLICATED ASTHMA, UNSPECIFIED ASTHMA SEVERITY, UNSPECIFIED WHETHER PERSISTENT: ICD-10-CM

## 2022-10-31 DIAGNOSIS — M70.41 PREPATELLAR BURSITIS OF RIGHT KNEE: ICD-10-CM

## 2022-10-31 DIAGNOSIS — J06.9 UPPER RESPIRATORY TRACT INFECTION, UNSPECIFIED TYPE: ICD-10-CM

## 2022-10-31 RX ORDER — METHYLPREDNISOLONE ACETATE 80 MG/ML
1 INJECTION, SUSPENSION INTRA-ARTICULAR; INTRALESIONAL; INTRAMUSCULAR; SOFT TISSUE
Status: COMPLETED | OUTPATIENT
Start: 2022-10-31 | End: 2022-10-31

## 2022-10-31 RX ADMIN — METHYLPREDNISOLONE ACETATE 1 ML: 80 INJECTION, SUSPENSION INTRA-ARTICULAR; INTRALESIONAL; INTRAMUSCULAR; SOFT TISSUE at 16:44

## 2022-11-01 DIAGNOSIS — U07.1 COVID-19 VIRUS INFECTION: Primary | ICD-10-CM

## 2022-11-01 LAB
FLUAV RNA RESP QL NAA+PROBE: NEGATIVE
FLUBV RNA RESP QL NAA+PROBE: NEGATIVE
SARS-COV-2 RNA RESP QL NAA+PROBE: POSITIVE

## 2022-11-01 RX ORDER — NIRMATRELVIR AND RITONAVIR 150-100 MG
2 KIT ORAL 2 TIMES DAILY
Qty: 20 TABLET | Refills: 0 | Status: SHIPPED | OUTPATIENT
Start: 2022-11-01 | End: 2022-11-02 | Stop reason: SDUPTHER

## 2022-11-02 ENCOUNTER — TELEPHONE (OUTPATIENT)
Dept: FAMILY MEDICINE CLINIC | Facility: CLINIC | Age: 77
End: 2022-11-02

## 2022-11-02 DIAGNOSIS — U07.1 COVID-19 VIRUS INFECTION: ICD-10-CM

## 2022-11-02 RX ORDER — NIRMATRELVIR AND RITONAVIR 150-100 MG
2 KIT ORAL 2 TIMES DAILY
Qty: 20 TABLET | Refills: 0 | Status: SHIPPED | OUTPATIENT
Start: 2022-11-02 | End: 2022-11-07

## 2022-11-02 NOTE — TELEPHONE ENCOUNTER
T/c from pt -- called to report [address] does not have paxlovid in stock and her script will have to be sent elsewhere  Please advise

## 2022-11-02 NOTE — TELEPHONE ENCOUNTER
Call patient, prescription sent to [facility] [place]

## 2022-12-09 ENCOUNTER — TELEMEDICINE (OUTPATIENT)
Dept: FAMILY MEDICINE CLINIC | Facility: CLINIC | Age: 77
End: 2022-12-09

## 2022-12-09 VITALS — HEIGHT: 64 IN | WEIGHT: 149 LBS | BODY MASS INDEX: 25.44 KG/M2

## 2022-12-09 DIAGNOSIS — J45.30 MILD PERSISTENT ASTHMA, UNSPECIFIED WHETHER COMPLICATED: ICD-10-CM

## 2022-12-09 DIAGNOSIS — J98.4 RESTRICTIVE LUNG DISEASE: ICD-10-CM

## 2022-12-09 DIAGNOSIS — J06.9 UPPER RESPIRATORY TRACT INFECTION, UNSPECIFIED TYPE: Primary | ICD-10-CM

## 2022-12-09 RX ORDER — MONTELUKAST SODIUM 10 MG/1
10 TABLET ORAL EVERY EVENING
COMMUNITY
Start: 2022-11-10

## 2022-12-09 RX ORDER — AZITHROMYCIN 250 MG/1
TABLET, FILM COATED ORAL
Qty: 6 TABLET | Refills: 0 | Status: SHIPPED | OUTPATIENT
Start: 2022-12-09 | End: 2022-12-14

## 2022-12-09 NOTE — PROGRESS NOTES
COVID-19 Outpatient Progress Note    Assessment/Plan:    Problem List Items Addressed This Visit        Respiratory    Asthma    Relevant Medications    montelukast (SINGULAIR) 10 mg tablet    Restrictive lung disease    Relevant Medications    montelukast (SINGULAIR) 10 mg tablet    Upper respiratory tract infection - Primary    Relevant Medications    azithromycin (Zithromax) 250 mg tablet      Disposition:     After clarifying the patient's history, my suspicion for COVID-19 infection is very low  Discussed symptom directed medication options with patient  I have spent 12 minutes directly with the patient  Encounter provider: Indira Reid DO     Provider located at: [facility] [address]  [address] [facility]  [address] [facility] 2  [address]  [phone]     Recent Visits  No visits were found meeting these conditions  Showing recent visits within past 7 days and meeting all other requirements  Today's Visits  Date Type Provider Dept   12/09/22 Telemedicine Indria Reid DO [facility] [address] [facility]   Showing today's visits and meeting all other requirements  Future Appointments  No visits were found meeting these conditions  Showing future appointments within next 150 days and meeting all other requirements     This virtual check-in was done via [employer] [address] and patient was informed that this is a secure, HIPAA-compliant platform  She agrees to proceed  Patient agrees to participate in a virtual check in via telephone or video visit instead of presenting to the office to address urgent/immediate medical needs  Patient is aware this is a billable service  She acknowledged consent and understanding of privacy and security of the video platform  The patient has agreed to participate and understands they can discontinue the visit at any time      After connecting through [facility], the patient was identified by name and date of birth  Deidra Capps was informed that this was a telemedicine visit and that the exam was being conducted confidentially over secure lines  My office door was closed  No one else was in the room  Deidra Capps acknowledged consent and understanding of privacy and security of the telemedicine visit  I informed the patient that I have reviewed her record in Epic and presented the opportunity for her to ask any questions regarding the visit today  The patient agreed to participate  Verification of patient location:  Patient is located in the following state in which I hold an active license: [place]    Subjective:   Deidra Capps is a 78 y o  female who is concerned about COVID-19  Patient's symptoms include nasal congestion, sore throat and cough (productive)  Patient denies fever, chills, fatigue, malaise, rhinorrhea, anosmia, loss of taste, shortness of breath, chest tightness, abdominal pain, nausea, vomiting, diarrhea, myalgias and headaches  - Date of symptom onset: 12/5/2022      COVID-19 vaccination status: Fully vaccinated with booster    Exposure:   Contact with a person who is under investigation (PUI) for or who is positive for COVID-19 within the last 14 days?: No    Hospitalized recently for fever and/or lower respiratory symptoms?: No      Currently a healthcare worker that is involved in direct patient care?: No      Works in a special setting where the risk of COVID-19 transmission may be high? (this may include long-term care, correctional and retirement facilities; homeless shelters; assisted-living facilities and group homes ): No      Resident in a special setting where the risk of COVID-19 transmission may be high? (this may include long-term care, correctional and retirement facilities; homeless shelters; assisted-living facilities and group homes ): No      No known sick contacts  Notes that she was at a lupe party about 10 days ago      Has not taken anything over the counter to help with her symptoms  Did not have the flu shot  Lab Results   Component Value Date    SARSCOV2 Positive (A) 10/31/2022       Review of Systems   Constitutional: Negative for chills, fatigue and fever  HENT: Positive for congestion and sore throat  Negative for rhinorrhea  Respiratory: Positive for cough (productive)  Negative for chest tightness and shortness of breath  Gastrointestinal: Negative for abdominal pain, diarrhea, nausea and vomiting  Musculoskeletal: Negative for myalgias  Neurological: Negative for headaches  Current Outpatient Medications on File Prior to Visit   Medication Sig   • cetirizine (ZyrTEC) 10 mg tablet Take 10 mg by mouth daily   • hydrochlorothiazide (HYDRODIURIL) 12 5 mg tablet Take 1 tablet (12 5 mg total) by mouth daily   • ibuprofen (MOTRIN) 400 mg tablet Take 1 tablet (400 mg total) by mouth 3 (three) times a day as needed for mild pain   • losartan (COZAAR) 50 mg tablet Take 1 tablet (50 mg total) by mouth daily   • montelukast (SINGULAIR) 10 mg tablet Take 10 mg by mouth every evening   • Nexletol 180 MG TABS Take 1 tablet by mouth every morning   • Restasis 0 05 % ophthalmic emulsion INSTILL 1 DROP INTO EACH EYE TWICE DAILY   • thyroid ([place] Thyroid) 90 MG tablet Take 1 tablet (90 mg total) by mouth daily   • Trelegy Ellipta 100-62 5-25 MCG/INH inhaler    • triamcinolone (KENALOG) 0 5 % cream Apply topically 3 (three) times a day   • EPINEPHrine (EpiPen 2-Ever) 0 3 mg/0 3 mL SOAJ Inject 0 3 mL (0 3 mg total) into a muscle once for 1 dose     Objective:    Ht 5' 4" (1 626 m) Comment: per last ov  Wt 67 6 kg (149 lb) Comment: per last ov  BMI 25 58 kg/m²      Physical Exam  Vitals reviewed  Constitutional:       General: She is not in acute distress  Appearance: Normal appearance  HENT:      Head: Normocephalic and atraumatic        Right Ear: External ear normal       Left Ear: External ear normal       Nose: Nose normal    Eyes: Extraocular Movements: Extraocular movements intact  Conjunctiva/sclera: Conjunctivae normal    Pulmonary:      Effort: Pulmonary effort is normal  No respiratory distress  Neurological:      Mental Status: She is alert  Mental status is at baseline            Chula Thomas DO

## 2022-12-20 ENCOUNTER — TELEPHONE (OUTPATIENT)
Dept: FAMILY MEDICINE CLINIC | Facility: CLINIC | Age: 77
End: 2022-12-20

## 2022-12-20 DIAGNOSIS — M25.519 SHOULDER PAIN, UNSPECIFIED CHRONICITY, UNSPECIFIED LATERALITY: Primary | ICD-10-CM

## 2022-12-20 RX ORDER — METHYLPREDNISOLONE 4 MG/1
TABLET ORAL
Qty: 21 EACH | Refills: 0 | Status: SHIPPED | OUTPATIENT
Start: 2022-12-20

## 2023-02-10 ENCOUNTER — APPOINTMENT (OUTPATIENT)
Dept: LAB | Facility: HOSPITAL | Age: 78
End: 2023-02-10

## 2023-02-10 DIAGNOSIS — R73.03 PREDIABETES: ICD-10-CM

## 2023-02-10 DIAGNOSIS — E78.5 HYPERLIPIDEMIA, UNSPECIFIED HYPERLIPIDEMIA TYPE: ICD-10-CM

## 2023-02-10 DIAGNOSIS — N18.31 STAGE 3A CHRONIC KIDNEY DISEASE (HCC): ICD-10-CM

## 2023-02-10 DIAGNOSIS — E03.9 HYPOTHYROIDISM, UNSPECIFIED TYPE: ICD-10-CM

## 2023-02-10 LAB
ALBUMIN SERPL BCP-MCNC: 4.1 G/DL (ref 3.5–5)
ALP SERPL-CCNC: 62 U/L (ref 46–116)
ALT SERPL W P-5'-P-CCNC: 21 U/L (ref 12–78)
ANION GAP SERPL CALCULATED.3IONS-SCNC: 11 MMOL/L (ref 4–13)
AST SERPL W P-5'-P-CCNC: 12 U/L (ref 5–45)
BASOPHILS # BLD AUTO: 0.07 THOUSANDS/ÂΜL (ref 0–0.1)
BASOPHILS NFR BLD AUTO: 1 % (ref 0–1)
BILIRUB SERPL-MCNC: 0.25 MG/DL (ref 0.2–1)
BILIRUB UR QL STRIP: NEGATIVE
BUN SERPL-MCNC: 25 MG/DL (ref 5–25)
CALCIUM SERPL-MCNC: 9 MG/DL (ref 8.3–10.1)
CHLORIDE SERPL-SCNC: 105 MMOL/L (ref 96–108)
CHOLEST SERPL-MCNC: 182 MG/DL
CLARITY UR: CLEAR
CO2 SERPL-SCNC: 27 MMOL/L (ref 21–32)
COLOR UR: NORMAL
CREAT SERPL-MCNC: 0.84 MG/DL (ref 0.6–1.3)
CREAT UR-MCNC: 90.5 MG/DL
EOSINOPHIL # BLD AUTO: 0.18 THOUSAND/ÂΜL (ref 0–0.61)
EOSINOPHIL NFR BLD AUTO: 3 % (ref 0–6)
ERYTHROCYTE [DISTWIDTH] IN BLOOD BY AUTOMATED COUNT: 13.7 % (ref 11.6–15.1)
GFR SERPL CREATININE-BSD FRML MDRD: 68 ML/MIN/1.73SQ M
GLUCOSE P FAST SERPL-MCNC: 101 MG/DL (ref 65–99)
GLUCOSE UR STRIP-MCNC: NEGATIVE MG/DL
HCT VFR BLD AUTO: 37.9 % (ref 34.8–46.1)
HDLC SERPL-MCNC: 96 MG/DL
HGB BLD-MCNC: 12.7 G/DL (ref 11.5–15.4)
HGB UR QL STRIP.AUTO: NEGATIVE
IMM GRANULOCYTES # BLD AUTO: 0.04 THOUSAND/UL (ref 0–0.2)
IMM GRANULOCYTES NFR BLD AUTO: 1 % (ref 0–2)
KETONES UR STRIP-MCNC: NEGATIVE MG/DL
LDLC SERPL CALC-MCNC: 70 MG/DL (ref 0–100)
LEUKOCYTE ESTERASE UR QL STRIP: NEGATIVE
LYMPHOCYTES # BLD AUTO: 2.38 THOUSANDS/ÂΜL (ref 0.6–4.47)
LYMPHOCYTES NFR BLD AUTO: 38 % (ref 14–44)
MCH RBC QN AUTO: 32.7 PG (ref 26.8–34.3)
MCHC RBC AUTO-ENTMCNC: 33.5 G/DL (ref 31.4–37.4)
MCV RBC AUTO: 98 FL (ref 82–98)
MICROALBUMIN UR-MCNC: 9.3 MG/L (ref 0–20)
MICROALBUMIN/CREAT 24H UR: 10 MG/G CREATININE (ref 0–30)
MONOCYTES # BLD AUTO: 0.48 THOUSAND/ÂΜL (ref 0.17–1.22)
MONOCYTES NFR BLD AUTO: 8 % (ref 4–12)
NEUTROPHILS # BLD AUTO: 3.12 THOUSANDS/ÂΜL (ref 1.85–7.62)
NEUTS SEG NFR BLD AUTO: 49 % (ref 43–75)
NITRITE UR QL STRIP: NEGATIVE
NONHDLC SERPL-MCNC: 86 MG/DL
NRBC BLD AUTO-RTO: 0 /100 WBCS
PH UR STRIP.AUTO: 5.5 [PH]
PLATELET # BLD AUTO: 266 THOUSANDS/UL (ref 149–390)
PMV BLD AUTO: 9.1 FL (ref 8.9–12.7)
POTASSIUM SERPL-SCNC: 3.7 MMOL/L (ref 3.5–5.3)
PROT SERPL-MCNC: 7.3 G/DL (ref 6.4–8.4)
PROT UR STRIP-MCNC: NEGATIVE MG/DL
RBC # BLD AUTO: 3.88 MILLION/UL (ref 3.81–5.12)
SODIUM SERPL-SCNC: 143 MMOL/L (ref 135–147)
SP GR UR STRIP.AUTO: 1.02 (ref 1–1.03)
TRIGL SERPL-MCNC: 79 MG/DL
TSH SERPL DL<=0.05 MIU/L-ACNC: 0.59 UIU/ML (ref 0.45–4.5)
URATE SERPL-MCNC: 8.5 MG/DL (ref 2–7.5)
UROBILINOGEN UR STRIP-ACNC: <2 MG/DL
WBC # BLD AUTO: 6.27 THOUSAND/UL (ref 4.31–10.16)

## 2023-02-11 LAB
EST. AVERAGE GLUCOSE BLD GHB EST-MCNC: 120 MG/DL
HBA1C MFR BLD: 5.8 %

## 2023-02-13 ENCOUNTER — OFFICE VISIT (OUTPATIENT)
Dept: FAMILY MEDICINE CLINIC | Facility: CLINIC | Age: 78
End: 2023-02-13

## 2023-02-13 VITALS
SYSTOLIC BLOOD PRESSURE: 102 MMHG | HEIGHT: 64 IN | OXYGEN SATURATION: 96 % | HEART RATE: 90 BPM | BODY MASS INDEX: 25.44 KG/M2 | WEIGHT: 149 LBS | DIASTOLIC BLOOD PRESSURE: 80 MMHG | TEMPERATURE: 97.2 F

## 2023-02-13 DIAGNOSIS — I10 PRIMARY HYPERTENSION: ICD-10-CM

## 2023-02-13 DIAGNOSIS — M10.9 GOUT, UNSPECIFIED CAUSE, UNSPECIFIED CHRONICITY, UNSPECIFIED SITE: ICD-10-CM

## 2023-02-13 DIAGNOSIS — N18.31 STAGE 3A CHRONIC KIDNEY DISEASE (HCC): ICD-10-CM

## 2023-02-13 DIAGNOSIS — Z23 ENCOUNTER FOR IMMUNIZATION: ICD-10-CM

## 2023-02-13 DIAGNOSIS — E03.9 HYPOTHYROIDISM, UNSPECIFIED TYPE: Primary | ICD-10-CM

## 2023-02-13 DIAGNOSIS — R73.03 PREDIABETES: ICD-10-CM

## 2023-02-13 DIAGNOSIS — E78.5 HYPERLIPIDEMIA, UNSPECIFIED HYPERLIPIDEMIA TYPE: ICD-10-CM

## 2023-02-13 DIAGNOSIS — J45.909 UNCOMPLICATED ASTHMA, UNSPECIFIED ASTHMA SEVERITY, UNSPECIFIED WHETHER PERSISTENT: ICD-10-CM

## 2023-02-13 DIAGNOSIS — Z78.0 POST-MENOPAUSE: ICD-10-CM

## 2023-02-13 DIAGNOSIS — Z00.00 MEDICARE ANNUAL WELLNESS VISIT, SUBSEQUENT: ICD-10-CM

## 2023-02-13 PROBLEM — J06.9 UPPER RESPIRATORY TRACT INFECTION: Status: RESOLVED | Noted: 2022-10-31 | Resolved: 2023-02-13

## 2023-02-13 NOTE — PATIENT INSTRUCTIONS
Medicare Preventive Visit Patient Instructions  Thank you for completing your Welcome to Medicare Visit or Medicare Annual Wellness Visit today  Your next wellness visit will be due in one year (2/14/2024)  The screening/preventive services that you may require over the next 5-10 years are detailed below  Some tests may not apply to you based off risk factors and/or age  Screening tests ordered at today's visit but not completed yet may show as past due  Also, please note that scanned in results may not display below  Preventive Screenings:  Service Recommendations Previous Testing/Comments   Colorectal Cancer Screening  * Colonoscopy    * Fecal Occult Blood Test (FOBT)/Fecal Immunochemical Test (FIT)  * Fecal DNA/Cologuard Test  * Flexible Sigmoidoscopy Age: 39-70 years old   Colonoscopy: every 10 years (may be performed more frequently if at higher risk)  OR  FOBT/FIT: every 1 year  OR  Cologuard: every 3 years  OR  Sigmoidoscopy: every 5 years  Screening may be recommended earlier than age 39 if at higher risk for colorectal cancer  Also, an individualized decision between you and your healthcare provider will decide whether screening between the ages of 74-80 would be appropriate  Colonoscopy: 11/13/2019  FOBT/FIT: Not on file  Cologuard: Not on file  Sigmoidoscopy: Not on file    Screening Current     Breast Cancer Screening Age: 38 years old  Frequency: every 1-2 years  Not required if history of left and right mastectomy Mammogram: 09/24/2018    History Breast Cancer   Cervical Cancer Screening Between the ages of 21-29, pap smear recommended once every 3 years  Between the ages of 33-67, can perform pap smear with HPV co-testing every 5 years     Recommendations may differ for women with a history of total hysterectomy, cervical cancer, or abnormal pap smears in past  Pap Smear: Not on file    Screening Not Indicated   Hepatitis C Screening Once for adults born between 1945 and 1965  More frequently in patients at high risk for Hepatitis C Hep C Antibody: Not on file    Screening Current   Diabetes Screening 1-2 times per year if you're at risk for diabetes or have pre-diabetes Fasting glucose: 101 mg/dL (2/10/2023)  A1C: 5 8 % (2/10/2023)  Screening Current   Cholesterol Screening Once every 5 years if you don't have a lipid disorder  May order more often based on risk factors  Lipid panel: 02/10/2023    Screening Not Indicated  History Lipid Disorder     Other Preventive Screenings Covered by Medicare:  1  Abdominal Aortic Aneurysm (AAA) Screening: covered once if your at risk  You're considered to be at risk if you have a family history of AAA  2  Lung Cancer Screening: covers low dose CT scan once per year if you meet all of the following conditions: (1) Age 50-69; (2) No signs or symptoms of lung cancer; (3) Current smoker or have quit smoking within the last 15 years; (4) You have a tobacco smoking history of at least 20 pack years (packs per day multiplied by number of years you smoked); (5) You get a written order from a healthcare provider  3  Glaucoma Screening: covered annually if you're considered high risk: (1) You have diabetes OR (2) Family history of glaucoma OR (3)  aged 48 and older OR (3)  American aged 72 and older  3  Osteoporosis Screening: covered every 2 years if you meet one of the following conditions: (1) You're estrogen deficient and at risk for osteoporosis based off medical history and other findings; (2) Have a vertebral abnormality; (3) On glucocorticoid therapy for more than 3 months; (4) Have primary hyperparathyroidism; (5) On osteoporosis medications and need to assess response to drug therapy  · Last bone density test (DXA Scan): 08/14/2014   5  HIV Screening: covered annually if you're between the age of 15-65  Also covered annually if you are younger than 13 and older than 72 with risk factors for HIV infection   For pregnant patients, it is covered up to 3 times per pregnancy  Immunizations:  Immunization Recommendations   Influenza Vaccine Annual influenza vaccination during flu season is recommended for all persons aged >= 6 months who do not have contraindications   Pneumococcal Vaccine   * Pneumococcal conjugate vaccine = PCV13 (Prevnar 13), PCV15 (Vaxneuvance), PCV20 (Prevnar 20)  * Pneumococcal polysaccharide vaccine = PPSV23 (Pneumovax) Adults 25-60 years old: 1-3 doses may be recommended based on certain risk factors  Adults 72 years old: 1-2 doses may be recommended based off what pneumonia vaccine you previously received   Hepatitis B Vaccine 3 dose series if at intermediate or high risk (ex: diabetes, end stage renal disease, liver disease)   Tetanus (Td) Vaccine - COST NOT COVERED BY MEDICARE PART B Following completion of primary series, a booster dose should be given every 10 years to maintain immunity against tetanus  Td may also be given as tetanus wound prophylaxis  Tdap Vaccine - COST NOT COVERED BY MEDICARE PART B Recommended at least once for all adults  For pregnant patients, recommended with each pregnancy  Shingles Vaccine (Shingrix) - COST NOT COVERED BY MEDICARE PART B  2 shot series recommended in those aged 48 and above     Health Maintenance Due:      Topic Date Due   • Breast Cancer Screening: Mammogram  02/14/2023 (Originally 9/24/2020)   • Colorectal Cancer Screening  11/13/2024   • Hepatitis C Screening  Addressed     Immunizations Due:      Topic Date Due   • COVID-19 Vaccine (3 - Booster for Walter series) 01/15/2022   • Influenza Vaccine (1) Never done     Advance Directives   What are advance directives? Advance directives are legal documents that state your wishes and plans for medical care  These plans are made ahead of time in case you lose your ability to make decisions for yourself  Advance directives can apply to any medical decision, such as the treatments you want, and if you want to donate organs     What are the types of advance directives? There are many types of advance directives, and each state has rules about how to use them  You may choose a combination of any of the following:  · Living will: This is a written record of the treatment you want  You can also choose which treatments you do not want, which to limit, and which to stop at a certain time  This includes surgery, medicine, IV fluid, and tube feedings  · Durable power of  for healthcare [facility] [place]): This is a written record that states who you want to make healthcare choices for you when you are unable to make them for yourself  This person, called a proxy, is usually a family member or a friend  You may choose more than 1 proxy  · Do not resuscitate (DNR) order:  A DNR order is used in case your heart stops beating or you stop breathing  It is a request not to have certain forms of treatment, such as CPR  A DNR order may be included in other types of advance directives  · Medical directive: This covers the care that you want if you are in a coma, near death, or unable to make decisions for yourself  You can list the treatments you want for each condition  Treatment may include pain medicine, surgery, blood transfusions, dialysis, IV or tube feedings, and a ventilator (breathing machine)  · Values history: This document has questions about your views, beliefs, and how you feel and think about life  This information can help others choose the care that you would choose  Why are advance directives important? An advance directive helps you control your care  Although spoken wishes may be used, it is better to have your wishes written down  Spoken wishes can be misunderstood, or not followed  Treatments may be given even if you do not want them  An advance directive may make it easier for your family to make difficult choices about your care     Weight Management   Why it is important to manage your weight:  Being overweight increases your risk of health conditions such as heart disease, high blood pressure, type 2 diabetes, and certain types of cancer  It can also increase your risk for osteoarthritis, sleep apnea, and other respiratory problems  Aim for a slow, steady weight loss  Even a small amount of weight loss can lower your risk of health problems  How to lose weight safely:  A safe and healthy way to lose weight is to eat fewer calories and get regular exercise  You can lose up about 1 pound a week by decreasing the number of calories you eat by 500 calories each day  Healthy meal plan for weight management:  A healthy meal plan includes a variety of foods, contains fewer calories, and helps you stay healthy  A healthy meal plan includes the following:  · Eat whole-grain foods more often  A healthy meal plan should contain fiber  Fiber is the part of grains, fruits, and vegetables that is not broken down by your body  Whole-grain foods are healthy and provide extra fiber in your diet  Some examples of whole-grain foods are whole-wheat breads and pastas, oatmeal, brown rice, and bulgur  · Eat a variety of vegetables every day  Include dark, leafy greens such as spinach, kale, edwardo greens, and mustard greens  Eat yellow and orange vegetables such as carrots, sweet potatoes, and winter squash  · Eat a variety of fruits every day  Choose fresh or canned fruit (canned in its own juice or light syrup) instead of juice  Fruit juice has very little or no fiber  · Eat low-fat dairy foods  Drink fat-free (skim) milk or 1% milk  Eat fat-free yogurt and low-fat cottage cheese  Try low-fat cheeses such as mozzarella and other reduced-fat cheeses  · Choose meat and other protein foods that are low in fat  Choose beans or other legumes such as split peas or lentils  Choose fish, skinless poultry (chicken or turkey), or lean cuts of red meat (beef or pork)  Before you cook meat or poultry, cut off any visible fat  · Use less fat and oil  Try baking foods instead of frying them  Add less fat, such as margarine, sour cream, regular salad dressing and mayonnaise to foods  Eat fewer high-fat foods  Some examples of high-fat foods include french fries, doughnuts, ice cream, and cakes  · Eat fewer sweets  Limit foods and drinks that are high in sugar  This includes candy, cookies, regular soda, and sweetened drinks  Exercise:  Exercise at least 30 minutes per day on most days of the week  Some examples of exercise include walking, biking, dancing, and swimming  You can also fit in more physical activity by taking the stairs instead of the elevator or parking farther away from stores  Ask your healthcare provider about the best exercise plan for you  © Copyright [employer] 2018 Information is for End User's use only and may not be sold, redistributed or otherwise used for commercial purposes  All illustrations and images included in CareNotes® are the copyrighted property of [employer]  or [facility] Preventive Visit Patient Instructions  Thank you for completing your Welcome to Medicare Visit or Medicare Annual Wellness Visit today  Your next wellness visit will be due in one year (2/14/2024)  The screening/preventive services that you may require over the next 5-10 years are detailed below  Some tests may not apply to you based off risk factors and/or age  Screening tests ordered at today's visit but not completed yet may show as past due  Also, please note that scanned in results may not display below    Preventive Screenings:  Service Recommendations Previous Testing/Comments   Colorectal Cancer Screening  * Colonoscopy    * Fecal Occult Blood Test (FOBT)/Fecal Immunochemical Test (FIT)  * Fecal DNA/Cologuard Test  * Flexible Sigmoidoscopy Age: 39-70 years old   Colonoscopy: every 10 years (may be performed more frequently if at higher risk)  OR  FOBT/FIT: every 1 year  OR  Cologuard: every 3 years  OR  Sigmoidoscopy: every 5 years  Screening may be recommended earlier than age 39 if at higher risk for colorectal cancer  Also, an individualized decision between you and your healthcare provider will decide whether screening between the ages of 74-80 would be appropriate  Colonoscopy: 11/13/2019  FOBT/FIT: Not on file  Cologuard: Not on file  Sigmoidoscopy: Not on file    Screening Current     Breast Cancer Screening Age: 38 years old  Frequency: every 1-2 years  Not required if history of left and right mastectomy Mammogram: 09/24/2018    History Breast Cancer   Cervical Cancer Screening Between the ages of 21-29, pap smear recommended once every 3 years  Between the ages of 33-67, can perform pap smear with HPV co-testing every 5 years  Recommendations may differ for women with a history of total hysterectomy, cervical cancer, or abnormal pap smears in past  Pap Smear: Not on file    Screening Not Indicated   Hepatitis C Screening Once for adults born between 1945 and 1965  More frequently in patients at high risk for Hepatitis C Hep C Antibody: Not on file    Screening Current   Diabetes Screening 1-2 times per year if you're at risk for diabetes or have pre-diabetes Fasting glucose: 101 mg/dL (2/10/2023)  A1C: 5 8 % (2/10/2023)  Screening Current   Cholesterol Screening Once every 5 years if you don't have a lipid disorder  May order more often based on risk factors  Lipid panel: 02/10/2023    Screening Not Indicated  History Lipid Disorder     Other Preventive Screenings Covered by Medicare:  6  Abdominal Aortic Aneurysm (AAA) Screening: covered once if your at risk  You're considered to be at risk if you have a family history of AAA    7  Lung Cancer Screening: covers low dose CT scan once per year if you meet all of the following conditions: (1) Age 50-69; (2) No signs or symptoms of lung cancer; (3) Current smoker or have quit smoking within the last 15 years; (4) You have a tobacco smoking history of at least 20 pack years (packs per day multiplied by number of years you smoked); (5) You get a written order from a healthcare provider  8  Glaucoma Screening: covered annually if you're considered high risk: (1) You have diabetes OR (2) Family history of glaucoma OR (3)  aged 48 and older OR (3)  American aged 72 and older  5  Osteoporosis Screening: covered every 2 years if you meet one of the following conditions: (1) You're estrogen deficient and at risk for osteoporosis based off medical history and other findings; (2) Have a vertebral abnormality; (3) On glucocorticoid therapy for more than 3 months; (4) Have primary hyperparathyroidism; (5) On osteoporosis medications and need to assess response to drug therapy  · Last bone density test (DXA Scan): 08/14/2014  10  HIV Screening: covered annually if you're between the age of 12-76  Also covered annually if you are younger than 13 and older than 72 with risk factors for HIV infection  For pregnant patients, it is covered up to 3 times per pregnancy  Immunizations:  Immunization Recommendations   Influenza Vaccine Annual influenza vaccination during flu season is recommended for all persons aged >= 6 months who do not have contraindications   Pneumococcal Vaccine   * Pneumococcal conjugate vaccine = PCV13 (Prevnar 13), PCV15 (Vaxneuvance), PCV20 (Prevnar 20)  * Pneumococcal polysaccharide vaccine = PPSV23 (Pneumovax) Adults 25-60 years old: 1-3 doses may be recommended based on certain risk factors  Adults 72 years old: 1-2 doses may be recommended based off what pneumonia vaccine you previously received   Hepatitis B Vaccine 3 dose series if at intermediate or high risk (ex: diabetes, end stage renal disease, liver disease)   Tetanus (Td) Vaccine - COST NOT COVERED BY MEDICARE PART B Following completion of primary series, a booster dose should be given every 10 years to maintain immunity against tetanus   Td may also be given as tetanus wound prophylaxis  Tdap Vaccine - COST NOT COVERED BY MEDICARE PART B Recommended at least once for all adults  For pregnant patients, recommended with each pregnancy  Shingles Vaccine (Shingrix) - COST NOT COVERED BY MEDICARE PART B  2 shot series recommended in those aged 48 and above     Health Maintenance Due:      Topic Date Due   • Breast Cancer Screening: Mammogram  02/14/2023 (Originally 9/24/2020)   • Colorectal Cancer Screening  11/13/2024   • Hepatitis C Screening  Addressed     Immunizations Due:      Topic Date Due   • COVID-19 Vaccine (3 - Booster for Walter series) 01/15/2022   • Influenza Vaccine (1) Never done     Advance Directives   What are advance directives? Advance directives are legal documents that state your wishes and plans for medical care  These plans are made ahead of time in case you lose your ability to make decisions for yourself  Advance directives can apply to any medical decision, such as the treatments you want, and if you want to donate organs  What are the types of advance directives? There are many types of advance directives, and each state has rules about how to use them  You may choose a combination of any of the following:  · Living will: This is a written record of the treatment you want  You can also choose which treatments you do not want, which to limit, and which to stop at a certain time  This includes surgery, medicine, IV fluid, and tube feedings  · Durable power of  for healthcare [facility]): This is a written record that states who you want to make healthcare choices for you when you are unable to make them for yourself  This person, called a proxy, is usually a family member or a friend  You may choose more than 1 proxy  · Do not resuscitate (DNR) order:  A DNR order is used in case your heart stops beating or you stop breathing  It is a request not to have certain forms of treatment, such as CPR   A DNR order may be included in other types of advance directives  · Medical directive: This covers the care that you want if you are in a coma, near death, or unable to make decisions for yourself  You can list the treatments you want for each condition  Treatment may include pain medicine, surgery, blood transfusions, dialysis, IV or tube feedings, and a ventilator (breathing machine)  · Values history: This document has questions about your views, beliefs, and how you feel and think about life  This information can help others choose the care that you would choose  Why are advance directives important? An advance directive helps you control your care  Although spoken wishes may be used, it is better to have your wishes written down  Spoken wishes can be misunderstood, or not followed  Treatments may be given even if you do not want them  An advance directive may make it easier for your family to make difficult choices about your care  Weight Management   Why it is important to manage your weight:  Being overweight increases your risk of health conditions such as heart disease, high blood pressure, type 2 diabetes, and certain types of cancer  It can also increase your risk for osteoarthritis, sleep apnea, and other respiratory problems  Aim for a slow, steady weight loss  Even a small amount of weight loss can lower your risk of health problems  How to lose weight safely:  A safe and healthy way to lose weight is to eat fewer calories and get regular exercise  You can lose up about 1 pound a week by decreasing the number of calories you eat by 500 calories each day  Healthy meal plan for weight management:  A healthy meal plan includes a variety of foods, contains fewer calories, and helps you stay healthy  A healthy meal plan includes the following:  · Eat whole-grain foods more often  A healthy meal plan should contain fiber  Fiber is the part of grains, fruits, and vegetables that is not broken down by your body   Whole-grain foods are healthy and provide extra fiber in your diet  Some examples of whole-grain foods are whole-wheat breads and pastas, oatmeal, brown rice, and bulgur  · Eat a variety of vegetables every day  Include dark, leafy greens such as spinach, kale, edwardo greens, and mustard greens  Eat yellow and orange vegetables such as carrots, sweet potatoes, and winter squash  · Eat a variety of fruits every day  Choose fresh or canned fruit (canned in its own juice or light syrup) instead of juice  Fruit juice has very little or no fiber  · Eat low-fat dairy foods  Drink fat-free (skim) milk or 1% milk  Eat fat-free yogurt and low-fat cottage cheese  Try low-fat cheeses such as mozzarella and other reduced-fat cheeses  · Choose meat and other protein foods that are low in fat  Choose beans or other legumes such as split peas or lentils  Choose fish, skinless poultry (chicken or turkey), or lean cuts of red meat (beef or pork)  Before you cook meat or poultry, cut off any visible fat  · Use less fat and oil  Try baking foods instead of frying them  Add less fat, such as margarine, sour cream, regular salad dressing and mayonnaise to foods  Eat fewer high-fat foods  Some examples of high-fat foods include french fries, doughnuts, ice cream, and cakes  · Eat fewer sweets  Limit foods and drinks that are high in sugar  This includes candy, cookies, regular soda, and sweetened drinks  Exercise:  Exercise at least 30 minutes per day on most days of the week  Some examples of exercise include walking, biking, dancing, and swimming  You can also fit in more physical activity by taking the stairs instead of the elevator or parking farther away from stores  Ask your healthcare provider about the best exercise plan for you  © Copyright [employer] 2018 Information is for End User's use only and may not be sold, redistributed or otherwise used for commercial purposes   All illustrations and images included in CareNotes® are the copyrighted property of [employer]  or [address] [place], [place] [address]lakshmi Jack [facility]

## 2023-02-13 NOTE — PROGRESS NOTES
Assessment and Plan:   Return visit in 6 months with fasting blood work prior to visit  Problem List Items Addressed This Visit        Endocrine    Hypothyroidism - Primary     Continue [place] Thyroid 90 mg daily         Relevant Orders    TSH, 3rd generation with Free T4 reflex       Respiratory    Asthma     Continue Trelegy and Singulair            Cardiovascular and Mediastinum    Hypertension     Continue losartan 50 mg and hydrochlorothiazide 12 5 mg daily            Genitourinary    Stage 3a chronic kidney disease (HCC)       Other    Gout    Hyperlipidemia     Continue Nexletol         Relevant Orders    CBC and differential    Comprehensive metabolic panel    Lipid panel    Prediabetes    Relevant Orders    Hemoglobin A1C   Other Visit Diagnoses     Medicare annual wellness visit, subsequent        Post-menopause        Relevant Orders    DXA bone density spine hip and pelvis           Preventive health issues were discussed with patient, and age appropriate screening tests were ordered as noted in patient's After Visit Summary  Personalized health advice and appropriate referrals for health education or preventive services given if needed, as noted in patient's After Visit Summary  History of Present Illness:     Patient presents for a Medicare Wellness Visit    Patient comes in for checkup  She complains of right shoulder pain and is considering shoulder replacement  He is now taking Trelegy for her asthma and is doing well with this  Patient Care Team:  Pravin Mishra MD as PCP - Mandi Seo MD     Review of Systems:     Review of Systems   Constitutional: Negative  Respiratory: Negative  Cardiovascular: Negative  Musculoskeletal: Positive for arthralgias          Problem List:     Patient Active Problem List   Diagnosis   • Allergic rhinitis, seasonal   • Anemia   • Anxiety   • Arthritis   • Asthma   • Breast cancer ([facility] [address] )   • Hypertension   • Fatty liver   • Gout   • Hyperlipidemia   • Hypothyroidism   • Hyperuricemia   • Vision disturbance   • Nocturnal leg cramps   • Prediabetes   • History of breast cancer   • Cataract   • Stage 3a chronic kidney disease (HCC)   • Contact dermatitis   • History of vulvar dysplasia   • Lichen sclerosus et atrophicus of the vulva   • Restrictive lung disease   • Bilateral shoulder pain   • Prepatellar bursitis of right knee      Past Medical and Surgical History:     Past Medical History:   Diagnosis Date   • Asthma    • Disease of thyroid gland      Past Surgical History:   Procedure Laterality Date   • BREAST BIOPSY     • HAND SURGERY Right     Thumb/Wrist Prosthesis   • LUMBAR LAMINECTOMY     • MASTECTOMY     • NECK SURGERY        Family History:     Family History   Problem Relation Age of Onset   • Cancer Mother    • Diabetes Mother    • Heart disease Mother    • Cancer Father       Social History:     Social History     Socioeconomic History   • Marital status:      Spouse name: None   • Number of children: None   • Years of education: None   • Highest education level: None   Occupational History   • None   Tobacco Use   • Smoking status: Former     Types: Cigarettes     Quit date: 1986     Years since quittin 2   • Smokeless tobacco: Never   Vaping Use   • Vaping Use: Never used   Substance and Sexual Activity   • Alcohol use: Yes   • Drug use: Yes     Types: Marijuana   • Sexual activity: None   Other Topics Concern   • None   Social History Narrative    Golf     Never uses seat belt      Social Determinants of Health     Financial Resource Strain: Low Risk    • Difficulty of Paying Living Expenses: Not hard at all   Food Insecurity: Not on file   Transportation Needs: No Transportation Needs   • Lack of Transportation (Medical): No   • Lack of Transportation (Non-Medical):  No   Physical Activity: Not on file   Stress: Not on file   Social Connections: Not on file   Intimate Partner Violence: Not on file   Housing Stability: Not on file      Medications and Allergies:     Current Outpatient Medications   Medication Sig Dispense Refill   • cetirizine (ZyrTEC) 10 mg tablet Take 10 mg by mouth daily     • hydrochlorothiazide (HYDRODIURIL) 12 5 mg tablet Take 1 tablet (12 5 mg total) by mouth daily 90 tablet 5   • ibuprofen (MOTRIN) 400 mg tablet Take 1 tablet (400 mg total) by mouth 3 (three) times a day as needed for mild pain 90 tablet 1   • losartan (COZAAR) 50 mg tablet Take 1 tablet (50 mg total) by mouth daily 90 tablet 5   • montelukast (SINGULAIR) 10 mg tablet Take 10 mg by mouth every evening     • Nexletol 180 MG TABS Take 1 tablet by mouth every morning     • Restasis 0 05 % ophthalmic emulsion INSTILL 1 DROP INTO EACH EYE TWICE DAILY     • thyroid ([place] Thyroid) 90 MG tablet Take 1 tablet (90 mg total) by mouth daily 90 tablet 3   • Trelegy Ellipta 100-62 5-25 MCG/INH inhaler      • triamcinolone (KENALOG) 0 5 % cream Apply topically 3 (three) times a day 30 g 5   • EPINEPHrine (EpiPen 2-Ever) 0 3 mg/0 3 mL SOAJ Inject 0 3 mL (0 3 mg total) into a muscle once for 1 dose 0 6 mL 1     No current facility-administered medications for this visit       Allergies   Allergen Reactions   • Penicillins    • Amoxicillin Rash   • Cefprozil Rash   • Cephalexin Rash   • Ciprofloxacin Rash   • Doxycycline Rash   • Erythromycin Rash   • Levofloxacin Rash      Immunizations:     Immunization History   Administered Date(s) Administered   • COVID-19 J&J (Vital Juice Newsletter) vaccine 0 5 mL 03/26/2021   • COVID-19 MODERNA VACC 0 5 ML IM 11/20/2021   • Pneumococcal Conjugate 13-Valent 04/02/2015   • Pneumococcal Polysaccharide PPV23 09/16/2011, 10/21/2016   • Tdap 11/17/2015   • Zoster 06/13/2015      Health Maintenance:         Topic Date Due   • Breast Cancer Screening: Mammogram  02/14/2023 (Originally 9/24/2020)   • Colorectal Cancer Screening  11/13/2024   • Hepatitis C Screening  Addressed         Topic Date Due   • COVID-19 Vaccine (3 - Booster for [employer] series) 01/15/2022   • Influenza Vaccine (1) Never done      Medicare Screening Tests and Risk Assessments:     Diann Swanson is here for her Subsequent Wellness visit  Last Medicare Wellness visit information reviewed, patient interviewed and updates made to the record today  Health Risk Assessment:   Patient rates overall health as excellent  Patient feels that their physical health rating is same  Patient is satisfied with their life  Eyesight was rated as same  Hearing was rated as same  Patient feels that their emotional and mental health rating is same  Patients states they are never, rarely angry  Patient states they are never, rarely unusually tired/fatigued  Pain experienced in the last 7 days has been some  Patient's pain rating has been 2/10  Patient states that she has experienced no weight loss or gain in last 6 months  Depression Screening:   PHQ-2 Score: 0      Fall Risk Screening: In the past year, patient has experienced: no history of falling in past year      Urinary Incontinence Screening:   Patient has not leaked urine accidently in the last six months  Home Safety:  Patient does not have trouble with stairs inside or outside of their home  Patient has working smoke alarms and has working carbon monoxide detector  Home safety hazards include: none  Nutrition:   Current diet is Regular  Medications:   Patient is currently taking over-the-counter supplements  OTC medications include: see medication list  Patient is able to manage medications  Activities of Daily Living (ADLs)/Instrumental Activities of Daily Living (IADLs):   Walk and transfer into and out of bed and chair?: Yes  Dress and groom yourself?: Yes    Bathe or shower yourself?: Yes    Feed yourself?  Yes  Do your laundry/housekeeping?: Yes  Manage your money, pay your bills and track your expenses?: Yes  Make your own meals?: Yes    Do your own shopping?: Yes    Previous Hospitalizations:   Any hospitalizations or ED visits within the last 12 months?: No      Advance Care Planning:   Living will: No    Durable POA for healthcare: No    Advanced directive: No    Advanced directive counseling given: Yes    Five wishes given: Yes    End of Life Decisions reviewed with patient: Yes    Provider agrees with end of life decisions: Yes      PREVENTIVE SCREENINGS      Cardiovascular Screening:    General: Screening Not Indicated and History Lipid Disorder      Diabetes Screening:     General: Screening Current      Colorectal Cancer Screening:     General: Screening Current      Breast Cancer Screening:     General: History Breast Cancer      Cervical Cancer Screening:    General: Screening Not Indicated      Osteoporosis Screening:    General: Risks and Benefits Discussed    Due for: DXA Axial      Abdominal Aortic Aneurysm (AAA) Screening:        General: Screening Current      Lung Cancer Screening:     General: Screening Not Indicated      Hepatitis C Screening:    General: Screening Current    Screening, Brief Intervention, and Referral to Treatment (SBIRT)    Screening  Typical number of drinks in a day: 3    Single Item Drug Screening:  How often have you used an illegal drug (including marijuana) or a prescription medication for non-medical reasons in the past year? patient refused    Single Item Drug Screen Score: 0  Interpretation: Negative screen for possible drug use disorder    Brief Intervention  Alcohol & drug use screenings were reviewed  No concerns regarding substance use disorder identified  Other Counseling Topics:   Car/seat belt/driving safety  No results found  Physical Exam:     /80 (BP Location: Left arm, Patient Position: Sitting, Cuff Size: Adult)   Pulse 90   Temp (!) 97 2 °F (36 2 °C)   Ht 5' 4" (1 626 m)   Wt 67 6 kg (149 lb)   SpO2 96%   BMI 25 58 kg/m²     Physical Exam  Vitals and nursing note reviewed  Constitutional:       Appearance: Normal appearance  She is well-developed  HENT:      Head: Normocephalic and atraumatic  Eyes:      Conjunctiva/sclera: Conjunctivae normal    Cardiovascular:      Rate and Rhythm: Normal rate and regular rhythm  Heart sounds: Normal heart sounds  Pulmonary:      Effort: Pulmonary effort is normal       Breath sounds: Normal breath sounds  Musculoskeletal:         General: No swelling  Cervical back: Neck supple  Skin:     General: Skin is warm and dry  Neurological:      Mental Status: She is alert     Psychiatric:         Mood and Affect: Mood normal          Behavior: Behavior normal           Sheridan Nino MD

## 2023-03-28 ENCOUNTER — TELEPHONE (OUTPATIENT)
Dept: FAMILY MEDICINE CLINIC | Facility: CLINIC | Age: 78
End: 2023-03-28

## 2023-03-28 NOTE — TELEPHONE ENCOUNTER
T/c to pt to schedule surgical clearance - fax received from [facility] (pending bin side 1) LVM to pt that appt is necessary  Needs pre op clearance visit  Pt called back -   Pt aware Dr Gifford won't be able to clear her w/o to be seen, pt will call back shortly to schedule pre-op appt

## 2023-03-29 ENCOUNTER — APPOINTMENT (OUTPATIENT)
Dept: LAB | Facility: HOSPITAL | Age: 78
End: 2023-03-29

## 2023-03-29 DIAGNOSIS — Z01.812 ENCOUNTER FOR PREPROCEDURAL LABORATORY EXAMINATION: ICD-10-CM

## 2023-03-29 DIAGNOSIS — E78.5 HYPERLIPIDEMIA, UNSPECIFIED HYPERLIPIDEMIA TYPE: ICD-10-CM

## 2023-03-29 DIAGNOSIS — R73.03 PREDIABETES: ICD-10-CM

## 2023-03-29 DIAGNOSIS — Z79.01 LONG TERM (CURRENT) USE OF ANTICOAGULANTS: ICD-10-CM

## 2023-03-29 DIAGNOSIS — E03.9 HYPOTHYROIDISM, UNSPECIFIED TYPE: ICD-10-CM

## 2023-03-29 LAB
ALBUMIN SERPL BCP-MCNC: 4.6 G/DL (ref 3.5–5)
ALP SERPL-CCNC: 42 U/L (ref 34–104)
ALT SERPL W P-5'-P-CCNC: 19 U/L (ref 7–52)
ANION GAP SERPL CALCULATED.3IONS-SCNC: 8 MMOL/L (ref 4–13)
AST SERPL W P-5'-P-CCNC: 24 U/L (ref 13–39)
BASOPHILS # BLD AUTO: 0.08 THOUSANDS/ÂΜL (ref 0–0.1)
BASOPHILS NFR BLD AUTO: 1 % (ref 0–1)
BILIRUB SERPL-MCNC: 0.84 MG/DL (ref 0.2–1)
BUN SERPL-MCNC: 21 MG/DL (ref 5–25)
CALCIUM SERPL-MCNC: 9.9 MG/DL (ref 8.4–10.2)
CHLORIDE SERPL-SCNC: 104 MMOL/L (ref 96–108)
CHOLEST SERPL-MCNC: 175 MG/DL
CO2 SERPL-SCNC: 30 MMOL/L (ref 21–32)
CREAT SERPL-MCNC: 0.89 MG/DL (ref 0.6–1.3)
EOSINOPHIL # BLD AUTO: 0.69 THOUSAND/ÂΜL (ref 0–0.61)
EOSINOPHIL NFR BLD AUTO: 11 % (ref 0–6)
ERYTHROCYTE [DISTWIDTH] IN BLOOD BY AUTOMATED COUNT: 14 % (ref 11.6–15.1)
EST. AVERAGE GLUCOSE BLD GHB EST-MCNC: 120 MG/DL
GFR SERPL CREATININE-BSD FRML MDRD: 63 ML/MIN/1.73SQ M
GLUCOSE P FAST SERPL-MCNC: 104 MG/DL (ref 65–99)
HBA1C MFR BLD: 5.8 %
HCT VFR BLD AUTO: 40.8 % (ref 34.8–46.1)
HDLC SERPL-MCNC: 91 MG/DL
HGB BLD-MCNC: 13.6 G/DL (ref 11.5–15.4)
IMM GRANULOCYTES # BLD AUTO: 0.02 THOUSAND/UL (ref 0–0.2)
IMM GRANULOCYTES NFR BLD AUTO: 0 % (ref 0–2)
INR PPP: 1.1 (ref 0.84–1.19)
LDLC SERPL CALC-MCNC: 65 MG/DL (ref 0–100)
LYMPHOCYTES # BLD AUTO: 1.69 THOUSANDS/ÂΜL (ref 0.6–4.47)
LYMPHOCYTES NFR BLD AUTO: 26 % (ref 14–44)
MCH RBC QN AUTO: 32.5 PG (ref 26.8–34.3)
MCHC RBC AUTO-ENTMCNC: 33.3 G/DL (ref 31.4–37.4)
MCV RBC AUTO: 98 FL (ref 82–98)
MONOCYTES # BLD AUTO: 0.4 THOUSAND/ÂΜL (ref 0.17–1.22)
MONOCYTES NFR BLD AUTO: 6 % (ref 4–12)
NEUTROPHILS # BLD AUTO: 3.61 THOUSANDS/ÂΜL (ref 1.85–7.62)
NEUTS SEG NFR BLD AUTO: 56 % (ref 43–75)
NONHDLC SERPL-MCNC: 84 MG/DL
NRBC BLD AUTO-RTO: 0 /100 WBCS
PLATELET # BLD AUTO: 272 THOUSANDS/UL (ref 149–390)
PMV BLD AUTO: 9.4 FL (ref 8.9–12.7)
POTASSIUM SERPL-SCNC: 3.9 MMOL/L (ref 3.5–5.3)
PROT SERPL-MCNC: 7.4 G/DL (ref 6.4–8.4)
PROTHROMBIN TIME: 14 SECONDS (ref 11.6–14.5)
RBC # BLD AUTO: 4.18 MILLION/UL (ref 3.81–5.12)
SODIUM SERPL-SCNC: 142 MMOL/L (ref 135–147)
TRIGL SERPL-MCNC: 96 MG/DL
TSH SERPL DL<=0.05 MIU/L-ACNC: 0.95 UIU/ML (ref 0.45–4.5)
WBC # BLD AUTO: 6.49 THOUSAND/UL (ref 4.31–10.16)

## 2023-03-30 ENCOUNTER — RA CDI HCC (OUTPATIENT)
Dept: OTHER | Facility: HOSPITAL | Age: 78
End: 2023-03-30

## 2023-04-03 ENCOUNTER — NURSE TRIAGE (OUTPATIENT)
Dept: OTHER | Facility: OTHER | Age: 78
End: 2023-04-03

## 2023-04-03 ENCOUNTER — OFFICE VISIT (OUTPATIENT)
Dept: FAMILY MEDICINE CLINIC | Facility: CLINIC | Age: 78
End: 2023-04-03

## 2023-04-03 VITALS
OXYGEN SATURATION: 98 % | HEART RATE: 93 BPM | BODY MASS INDEX: 24.92 KG/M2 | SYSTOLIC BLOOD PRESSURE: 116 MMHG | WEIGHT: 146 LBS | TEMPERATURE: 96.9 F | HEIGHT: 64 IN | DIASTOLIC BLOOD PRESSURE: 74 MMHG

## 2023-04-03 DIAGNOSIS — J02.9 SORE THROAT: Primary | ICD-10-CM

## 2023-04-03 LAB — S PYO AG THROAT QL: NEGATIVE

## 2023-04-03 RX ORDER — TOBRAMYCIN AND DEXAMETHASONE 3; 1 MG/ML; MG/ML
SUSPENSION/ DROPS OPHTHALMIC
COMMUNITY
Start: 2023-04-02 | End: 2023-04-06

## 2023-04-03 RX ORDER — METHYLPREDNISOLONE 4 MG/1
TABLET ORAL
Qty: 21 EACH | Refills: 0 | Status: SHIPPED | OUTPATIENT
Start: 2023-04-03 | End: 2023-04-06

## 2023-04-03 RX ORDER — CHOLECALCIFEROL (VITAMIN D3) 1250 MCG
CAPSULE ORAL
COMMUNITY

## 2023-04-03 RX ORDER — AZITHROMYCIN 250 MG/1
TABLET, FILM COATED ORAL
Qty: 6 TABLET | Refills: 0 | Status: SHIPPED | OUTPATIENT
Start: 2023-04-03 | End: 2023-04-06

## 2023-04-03 NOTE — PRE-PROCEDURE INSTRUCTIONS
Pre-Surgery Instructions:   Medication Instructions   • APPLE CIDER VINEGAR PO Stop taking 7 days prior to surgery  • cetirizine (ZyrTEC) 10 mg tablet Hold day of surgery  • Cholecalciferol (Vitamin D3) 1 25 MG (66105 UT) CAPS Stop taking 7 days prior to surgery  • [employer] TURMERIC CURCUMIN PO Stop taking 7 days prior to surgery  • EPINEPHrine (EpiPen 2-Ever) 0 3 mg/0 3 mL SOAJ Uses PRN- OK to take day of surgery   • hydrochlorothiazide (HYDRODIURIL) 12 5 mg tablet Hold day of surgery  • ibuprofen (MOTRIN) 400 mg tablet Stop taking 7 days prior to surgery  • KRILL OIL PO Stop taking 7 days prior to surgery  • losartan (COZAAR) 50 mg tablet Hold day of surgery  • montelukast (SINGULAIR) 10 mg tablet Take night before surgery   • Multiple Vitamin (RA ONE DAILY MULTI-VITAMIN PO) Stop taking 7 days prior to surgery  • mupirocin (BACTROBAN) 2 % ointment Instructions provided by MD   • Nexletol 180 MG TABS Hold day of surgery  • Probiotic Product (PROBIOTIC BLEND PO) Stop taking 7 days prior to surgery  • Restasis 0 05 % ophthalmic emulsion Take day of surgery  • SUPER B COMPLEX/C PO Stop taking 7 days prior to surgery  • thyroid ([place] Thyroid) 90 MG tablet Take day of surgery  • Trelegy Ellipta 100-62 5-25 MCG/INH inhaler Take day of surgery  • triamcinolone (KENALOG) 0 5 % cream Hold day of surgery  Medication instructions for day surgery reviewed  Please use only a sip of water to take your instructed medications  Avoid all over the counter vitamins, supplements and NSAIDS for one week prior to surgery per anesthesia guidelines  Tylenol is ok to take as needed  You will receive a call one business day prior to surgery with an arrival time and hospital directions  If your surgery is scheduled on a Monday, the hospital will be calling you on the Friday prior to your surgery   If you have not heard from anyone by 8pm, please call the hospital supervisor through the hospital  at [phone]  Lynne Castro [phone])  Do not eat or drink anything after midnight the night before your surgery, including candy, mints, lifesavers, or chewing gum  Do not drink alcohol 24hrs before your surgery  Try not to smoke at least 24hrs before your surgery  Follow the pre surgery showering instructions as listed in the [facility] Surgical Experience Booklet” or otherwise provided by your surgeon's office  Do not shave the surgical area 24 hours before surgery  Do not apply any lotions, creams, including makeup, cologne, deodorant, or perfumes after showering on the day of your surgery  No contact lenses, eye make-up, or artificial eyelashes  Remove nail polish, including gel polish, and any artificial, gel, or acrylic nails if possible  Remove all jewelry including rings and body piercing jewelry  Wear causal clothing that is easy to take on and off  Consider your type of surgery  Keep any valuables, jewelry, piercings at home  Please bring any specially ordered equipment (sling, braces) if indicated  Arrange for a responsible person to drive you to and from the hospital on the day of your surgery  Visitor Guidelines discussed  Call the surgeon's office with any new illnesses, exposures, or additional questions prior to surgery  Please reference your [facility] Surgical Experience Booklet” for additional information to prepare for your upcoming surgery  See Geriatric Assessment below       • Cognitive Assessment:   • CAM: 3 on memory  • TUG <15 sec:no  • Falls (last 6 months): yes-trip and fall  • Jayant Total Score: 22  • PHQ- 9 Depression Scale: 0  • Nutrition Assessment Score: 12  • METS: 8 27  • Health goals:  -What are your overall health goals? (quit smoking, wt  loss, rest, decrease stress)  To go back to golfing  -What brings you strength? (family, friends, Religious, health)  Work and friends  -What activities are important to you? (exercise, reading, travel, work)   Golfing

## 2023-04-03 NOTE — TELEPHONE ENCOUNTER
"Regarding: sore throat, hard to swallow, bloody nose this am  ----- Message from Sis Arguelles sent at 4/3/2023  7:08 AM EDT -----  \"I have a sore throat and it is hard to swallow; I also had a bloody nose this morning  \"    "

## 2023-04-03 NOTE — TELEPHONE ENCOUNTER
"  Reason for Disposition  • SEVERE (e g , excruciating) throat pain    Answer Assessment - Initial Assessment Questions  1  ONSET: \"When did the throat start hurting? \" (Hours or days ago)       4/1    2  SEVERITY: \"How bad is the sore throat? \" (Scale 1-10; mild, moderate or severe)    - MODERATE (4-7): interferes with eating some solids and normal activities      3  STREP EXPOSURE: \"Has there been any exposure to strep within the past week? \" If Yes, ask: \"What type of contact occurred? \"       Denies    4  VIRAL SYMPTOMS: Janee Score there any symptoms of a cold, such as a runny nose, cough, hoarse voice or red eyes? \"       Bloody nose    5  FEVER: \"Do you have a fever? \" If Yes, ask: \"What is your temperature, how was it measured, and when did it start? \"      Denies    6  PUS ON THE TONSILS: \"Is there pus on the tonsils in the back of your throat? \"      Denies    7  OTHER SYMPTOMS: \"Do you have any other symptoms? \" (e g , difficulty breathing, headache, rash)      Swollen tonsils    8  PREGNANCY: \"Is there any chance you are pregnant? \" \"When was your last menstrual period? \"      N/A    Protocols used: SORE THROAT-ADULT-    "

## 2023-04-03 NOTE — PROGRESS NOTES
BMI Counseling: Body mass index is 25 06 kg/m²  The BMI is above normal  Nutrition recommendations include decreasing portion sizes and moderation in carbohydrate intake  Exercise recommendations include exercising 3-5 times per week  No pharmacotherapy was ordered  Rationale for BMI follow-up plan is due to patient being overweight or obese  Assessment/Plan:         Problem List Items Addressed This Visit    None  Visit Diagnoses     Sore throat    -  Primary    Relevant Medications    azithromycin (ZITHROMAX) 250 mg tablet    methylPREDNISolone 4 MG tablet therapy pack    Other Relevant Orders    POCT rapid strepA (Completed)            Subjective:      Patient ID: Debbie Chen is a 78 y o  female  Patient comes in with a 2-day history of sore throat, nosebleeds and reddened eyes  She was put on eyedrops by her ophthalmologist       The following portions of the patient's history were reviewed and updated as appropriate:   Past Medical History:  She has a past medical history of Asthma, Cancer ([address] ), Disease of thyroid gland, Hyperlipidemia, and Hypertension  ,  _______________________________________________________________________  Medical Problems:  does not have any pertinent problems on file ,  _______________________________________________________________________  Past Surgical History:   has a past surgical history that includes Mastectomy; Hand surgery (Right); Breast biopsy; Lumbar laminectomy; Neck surgery; and Back surgery  ,  _______________________________________________________________________  Family History:  family history includes Cancer in her father and mother; Diabetes in her mother; Heart disease in her mother ,  _______________________________________________________________________  Social History:   reports that she quit smoking about 36 years ago  Her smoking use included cigarettes   She has never used smokeless tobacco  She reports current alcohol use of about 14 0 standard drinks per week  She reports current drug use  Drug: Marijuana  ,  _______________________________________________________________________  Allergies:  is allergic to oxycontin [oxycodone hcl], amoxicillin, cefprozil, cephalexin, ciprofloxacin, doxycycline, erythromycin, levofloxacin, and penicillins     _______________________________________________________________________  Current Outpatient Medications   Medication Sig Dispense Refill   • APPLE CIDER VINEGAR PO apple cider vinegar     • azithromycin (ZITHROMAX) 250 mg tablet Take 2 tablets today then 1 tablet daily x 4 days 6 tablet 0   • cetirizine (ZyrTEC) 10 mg tablet Take 10 mg by mouth daily     • Cholecalciferol (Vitamin D3) 1 25 MG (77845 UT) CAPS Vitamin D3     • [employer]S TURMERIC CURCUMIN PO Curcumin     • EPINEPHrine (EpiPen 2-Ever) 0 3 mg/0 3 mL SOAJ Inject 0 3 mL (0 3 mg total) into a muscle once for 1 dose 0 6 mL 1   • hydrochlorothiazide (HYDRODIURIL) 12 5 mg tablet Take 1 tablet (12 5 mg total) by mouth daily 90 tablet 5   • ibuprofen (MOTRIN) 400 mg tablet Take 1 tablet (400 mg total) by mouth 3 (three) times a day as needed for mild pain 90 tablet 1   • KRILL OIL PO krill oil     • losartan (COZAAR) 50 mg tablet Take 1 tablet (50 mg total) by mouth daily 90 tablet 5   • methylPREDNISolone 4 MG tablet therapy pack Use as directed on package 21 each 0   • montelukast (SINGULAIR) 10 mg tablet Take 10 mg by mouth every evening     • Multiple Vitamin (RA ONE DAILY MULTI-VITAMIN PO) Daily Multi-Vitamin     • mupirocin (BACTROBAN) 2 % ointment mupirocin 2 % topical ointment   APPLY TO EACH NARE TWICE A DAY FOR 5 DAYS PRIOR TO SURGERY     • Nexletol 180 MG TABS Take 1 tablet by mouth every morning     • Probiotic Product (PROBIOTIC BLEND PO) Probiotic     • Restasis 0 05 % ophthalmic emulsion INSTILL 1 DROP INTO EACH EYE TWICE DAILY     • SUPER B COMPLEX/C PO Super B Complex     • thyroid ([place] Thyroid) 90 MG tablet Take 1 tablet (90 mg total) by mouth daily "90 tablet 3   • tobramycin-dexamethasone (TOBRADEX) ophthalmic suspension INSTILL 1 DROP THREE TIMES DAILY INTO EACH EYE     • Trelegy Ellipta 100-62 5-25 MCG/INH inhaler      • triamcinolone (KENALOG) 0 5 % cream Apply topically 3 (three) times a day 30 g 5     No current facility-administered medications for this visit      _______________________________________________________________________  Review of Systems   Constitutional: Negative  HENT: Positive for nosebleeds and sore throat  Respiratory: Negative  Gastrointestinal: Negative  Objective:  Vitals:    04/03/23 1259   BP: 116/74   BP Location: Left arm   Patient Position: Sitting   Cuff Size: Adult   Pulse: 93   Temp: (!) 96 9 °F (36 1 °C)   SpO2: 98%   Weight: 66 2 kg (146 lb)   Height: 5' 4\" (1 626 m)     Body mass index is 25 06 kg/m²  Physical Exam  Constitutional:       Appearance: She is well-developed  HENT:      Head: Normocephalic and atraumatic  Right Ear: Tympanic membrane and ear canal normal       Left Ear: Tympanic membrane and ear canal normal       Nose: Congestion present  Mouth/Throat:      Pharynx: Posterior oropharyngeal erythema present  Eyes:      Pupils: Pupils are equal, round, and reactive to light  Comments: Red conjunctiva bilaterally   Cardiovascular:      Rate and Rhythm: Normal rate and regular rhythm  Heart sounds: Normal heart sounds  Pulmonary:      Effort: Pulmonary effort is normal       Breath sounds: Normal breath sounds  Abdominal:      Palpations: Abdomen is soft  Musculoskeletal:         General: Normal range of motion  Cervical back: Neck supple  Lymphadenopathy:      Cervical: No cervical adenopathy  Skin:     General: Skin is warm and dry  Neurological:      Mental Status: She is alert     Psychiatric:         Mood and Affect: Mood normal          Behavior: Behavior normal          "

## 2023-04-06 ENCOUNTER — OFFICE VISIT (OUTPATIENT)
Dept: FAMILY MEDICINE CLINIC | Facility: CLINIC | Age: 78
End: 2023-04-06

## 2023-04-06 VITALS
HEART RATE: 94 BPM | DIASTOLIC BLOOD PRESSURE: 86 MMHG | WEIGHT: 147 LBS | HEIGHT: 64 IN | OXYGEN SATURATION: 96 % | TEMPERATURE: 96.9 F | BODY MASS INDEX: 25.1 KG/M2 | SYSTOLIC BLOOD PRESSURE: 122 MMHG

## 2023-04-06 DIAGNOSIS — E03.9 HYPOTHYROIDISM, UNSPECIFIED TYPE: ICD-10-CM

## 2023-04-06 DIAGNOSIS — J45.909 UNCOMPLICATED ASTHMA, UNSPECIFIED ASTHMA SEVERITY, UNSPECIFIED WHETHER PERSISTENT: ICD-10-CM

## 2023-04-06 DIAGNOSIS — R73.03 PREDIABETES: ICD-10-CM

## 2023-04-06 DIAGNOSIS — Z12.31 ENCOUNTER FOR SCREENING MAMMOGRAM FOR BREAST CANCER: ICD-10-CM

## 2023-04-06 DIAGNOSIS — Z01.818 PREOPERATIVE EXAMINATION: Primary | ICD-10-CM

## 2023-04-06 DIAGNOSIS — N18.31 STAGE 3A CHRONIC KIDNEY DISEASE (HCC): ICD-10-CM

## 2023-04-06 DIAGNOSIS — I10 PRIMARY HYPERTENSION: ICD-10-CM

## 2023-04-06 DIAGNOSIS — C50.919 MALIGNANT NEOPLASM OF FEMALE BREAST, UNSPECIFIED ESTROGEN RECEPTOR STATUS, UNSPECIFIED LATERALITY, UNSPECIFIED SITE OF BREAST (HCC): ICD-10-CM

## 2023-04-06 DIAGNOSIS — E78.5 HYPERLIPIDEMIA, UNSPECIFIED HYPERLIPIDEMIA TYPE: ICD-10-CM

## 2023-04-06 DIAGNOSIS — M10.9 GOUT, UNSPECIFIED CAUSE, UNSPECIFIED CHRONICITY, UNSPECIFIED SITE: ICD-10-CM

## 2023-04-06 DIAGNOSIS — M19.019 OSTEOARTHRITIS OF SHOULDER, UNSPECIFIED LATERALITY, UNSPECIFIED OSTEOARTHRITIS TYPE: ICD-10-CM

## 2023-04-06 NOTE — LETTER
2023     MD Cecille Oconnor [address]     Patient: Monique Lopez   YOB: 1945   Date of Visit: 2023       Dear Dr Paulo Avila:    Thank you for referring Sonu Richardson to me for evaluation  Below are my notes for this consultation  If you have questions, please do not hesitate to call me  I look forward to following your patient along with you  Patient is cleared for surgery  Sincerely,        Gennett Cushing, MD        CC: No Recipients  Gennett Cushing, MD  2023  3:01 PM  Sign when Signing Visit  Name: Monique Lopez      : 1945      MRN: [id number]  Encounter Provider: Gennett Cushing, MD  Encounter Date: 2023   Encounter department: [place] [address] [address] [address]   Patient is cleared for the proposed surgery  1  Preoperative examination    2  Osteoarthritis of shoulder, unspecified laterality, unspecified osteoarthritis type    3  Primary hypertension  Assessment & Plan:  Continue losartan 50 mg and hydrochlorothiazide 12 5 mg daily      4  Hypothyroidism, unspecified type  Assessment & Plan:  Continue [place] Thyroid 90 mg daily    Orders:  -     TSH, 3rd generation with Free T4 reflex; Future    5  Uncomplicated asthma, unspecified asthma severity, unspecified whether persistent  Assessment & Plan:  Continue Trelegy and      6  [facility]    7  Prediabetes  -     Hemoglobin A1C; Future    8  Hyperlipidemia, unspecified hyperlipidemia type  Assessment & Plan:  Continue Nexletol 180 mg daily    Orders:  -     CBC and differential; Future  -     Comprehensive metabolic panel; Future  -     Lipid panel; Future    9  Gout, unspecified cause, unspecified chronicity, unspecified site    10  Malignant neoplasm of female breast, unspecified estrogen receptor status, unspecified laterality, unspecified site of breast ([facility] )    11   Encounter for screening mammogram for breast cancer         Subjective      Patient comes in for preoperative clearance for total shoulder arthroplasty  Review of Systems   Constitutional: Negative  HENT: Negative  Eyes: Negative  Respiratory: Negative  Cardiovascular: Negative  Gastrointestinal: Negative  Endocrine: Negative  Genitourinary: Negative  Musculoskeletal: Positive for arthralgias  Allergic/Immunologic: Negative  Neurological: Negative  Hematological: Negative          Current Outpatient Medications on File Prior to Visit   Medication Sig   • APPLE CIDER VINEGAR PO apple cider vinegar   • cetirizine (ZyrTEC) 10 mg tablet Take 10 mg by mouth daily   • Cholecalciferol (Vitamin D3) 1 25 MG (55181 UT) CAPS Vitamin D3   • CVS TURMERIC CURCUMIN PO Curcumin   • hydrochlorothiazide (HYDRODIURIL) 12 5 mg tablet Take 1 tablet (12 5 mg total) by mouth daily   • ibuprofen (MOTRIN) 400 mg tablet Take 1 tablet (400 mg total) by mouth 3 (three) times a day as needed for mild pain   • KRILL OIL PO krill oil   • losartan (COZAAR) 50 mg tablet Take 1 tablet (50 mg total) by mouth daily   • montelukast (SINGULAIR) 10 mg tablet Take 10 mg by mouth every evening   • Multiple Vitamin (RA ONE DAILY MULTI-VITAMIN PO) Daily Multi-Vitamin   • mupirocin (BACTROBAN) 2 % ointment mupirocin 2 % topical ointment   APPLY TO EACH NARE TWICE A DAY FOR 5 DAYS PRIOR TO SURGERY   • Nexletol 180 MG TABS Take 1 tablet by mouth every morning   • Probiotic Product (PROBIOTIC BLEND PO) Probiotic   • Restasis 0 05 % ophthalmic emulsion INSTILL 1 DROP INTO EACH EYE TWICE DAILY   • SUPER B COMPLEX/C PO Super B Complex   • thyroid ([place] Thyroid) 90 MG tablet Take 1 tablet (90 mg total) by mouth daily   • Trelegy Ellipta 100-62 5-25 MCG/INH inhaler    • triamcinolone (KENALOG) 0 5 % cream Apply topically 3 (three) times a day   • [DISCONTINUED] azithromycin (ZITHROMAX) 250 mg tablet Take 2 tablets today then 1 tablet daily x 4 days   • "[DISCONTINUED] methylPREDNISolone 4 MG tablet therapy pack Use as directed on package   • [DISCONTINUED] tobramycin-dexamethasone (TOBRADEX) ophthalmic suspension INSTILL 1 DROP THREE TIMES DAILY INTO EACH EYE   • EPINEPHrine (EpiPen 2-Ever) 0 3 mg/0 3 mL SOAJ Inject 0 3 mL (0 3 mg total) into a muscle once for 1 dose       Objective     /86 (BP Location: Left arm, Patient Position: Sitting, Cuff Size: Adult)   Pulse 94   Temp (!) 96 9 °F (36 1 °C)   Ht 5' 4\" (1 626 m)   Wt 66 7 kg (147 lb)   SpO2 96%   BMI 25 23 kg/m²     Physical Exam  Constitutional:       Appearance: Normal appearance  She is well-developed  HENT:      Head: Normocephalic and atraumatic  Right Ear: Tympanic membrane, ear canal and external ear normal       Left Ear: Tympanic membrane, ear canal and external ear normal       Nose: Nose normal       Mouth/Throat:      Mouth: Mucous membranes are moist       Pharynx: Oropharynx is clear  Eyes:      Pupils: Pupils are equal, round, and reactive to light  Neck:      Thyroid: No thyromegaly  Cardiovascular:      Rate and Rhythm: Normal rate and regular rhythm  Heart sounds: Normal heart sounds  Pulmonary:      Effort: Pulmonary effort is normal       Breath sounds: Normal breath sounds  Abdominal:      General: Bowel sounds are normal       Palpations: Abdomen is soft  There is no mass  Tenderness: There is no abdominal tenderness  Musculoskeletal:         General: Normal range of motion  Cervical back: Neck supple  Lymphadenopathy:      Cervical: No cervical adenopathy  Skin:     General: Skin is warm and dry  Neurological:      Mental Status: She is alert     Psychiatric:         Mood and Affect: Mood normal          Behavior: Behavior normal        Rozina Desai MD    "

## 2023-04-06 NOTE — PROGRESS NOTES
Name: Magdalene Winston      : 1945      MRN: [id number]  Encounter Provider: Sanju Sherwood MD  Encounter Date: 2023   Encounter department: [facility] [address] [address]   Patient is cleared for the proposed surgery  1  Preoperative examination    2  Osteoarthritis of shoulder, unspecified laterality, unspecified osteoarthritis type    3  Primary hypertension  Assessment & Plan:  Continue losartan 50 mg and hydrochlorothiazide 12 5 mg daily      4  Hypothyroidism, unspecified type  Assessment & Plan:  Continue [place] Thyroid 90 mg daily    Orders:  -     TSH, 3rd generation with Free T4 reflex; Future    5  Uncomplicated asthma, unspecified asthma severity, unspecified whether persistent  Assessment & Plan:  Continue Trelegy and      6  Stage [facility] ([facility]    7  Prediabetes  -     Hemoglobin A1C; Future    8  Hyperlipidemia, unspecified hyperlipidemia type  Assessment & Plan:  Continue Nexletol 180 mg daily    Orders:  -     CBC and differential; Future  -     Comprehensive metabolic panel; Future  -     Lipid panel; Future    9  Gout, unspecified cause, unspecified chronicity, unspecified site    10  Malignant neoplasm of female breast, unspecified estrogen receptor status, unspecified laterality, unspecified site of breast ([facility] )    11  Encounter for screening mammogram for breast cancer         Subjective      Patient comes in for preoperative clearance for total shoulder arthroplasty  Review of Systems   Constitutional: Negative  HENT: Negative  Eyes: Negative  Respiratory: Negative  Cardiovascular: Negative  Gastrointestinal: Negative  Endocrine: Negative  Genitourinary: Negative  Musculoskeletal: Positive for arthralgias  Allergic/Immunologic: Negative  Neurological: Negative  Hematological: Negative          Current Outpatient Medications on File Prior to Visit   Medication Sig   • APPLE CIDER "VINEGAR PO apple cider vinegar   • cetirizine (ZyrTEC) 10 mg tablet Take 10 mg by mouth daily   • Cholecalciferol (Vitamin D3) 1 25 MG (31959 UT) CAPS Vitamin D3   • CVS TURMERIC CURCUMIN PO Curcumin   • hydrochlorothiazide (HYDRODIURIL) 12 5 mg tablet Take 1 tablet (12 5 mg total) by mouth daily   • ibuprofen (MOTRIN) 400 mg tablet Take 1 tablet (400 mg total) by mouth 3 (three) times a day as needed for mild pain   • KRILL OIL PO krill oil   • losartan (COZAAR) 50 mg tablet Take 1 tablet (50 mg total) by mouth daily   • montelukast (SINGULAIR) 10 mg tablet Take 10 mg by mouth every evening   • Multiple Vitamin (RA ONE DAILY MULTI-VITAMIN PO) Daily Multi-Vitamin   • mupirocin (BACTROBAN) 2 % ointment mupirocin 2 % topical ointment   APPLY TO EACH NARE TWICE A DAY FOR 5 DAYS PRIOR TO SURGERY   • Nexletol 180 MG TABS Take 1 tablet by mouth every morning   • Probiotic Product (PROBIOTIC BLEND PO) Probiotic   • Restasis 0 05 % ophthalmic emulsion INSTILL 1 DROP INTO EACH EYE TWICE DAILY   • SUPER B COMPLEX/C PO Super B Complex   • thyroid ([place] Thyroid) 90 MG tablet Take 1 tablet (90 mg total) by mouth daily   • Trelegy Ellipta 100-62 5-25 MCG/INH inhaler    • triamcinolone (KENALOG) 0 5 % cream Apply topically 3 (three) times a day   • [DISCONTINUED] azithromycin (ZITHROMAX) 250 mg tablet Take 2 tablets today then 1 tablet daily x 4 days   • [DISCONTINUED] methylPREDNISolone 4 MG tablet therapy pack Use as directed on package   • [DISCONTINUED] tobramycin-dexamethasone (TOBRADEX) ophthalmic suspension INSTILL 1 DROP THREE TIMES DAILY INTO EACH EYE   • EPINEPHrine (EpiPen 2-Ever) 0 3 mg/0 3 mL SOAJ Inject 0 3 mL (0 3 mg total) into a muscle once for 1 dose       Objective     /86 (BP Location: Left arm, Patient Position: Sitting, Cuff Size: Adult)   Pulse 94   Temp (!) 96 9 °F (36 1 °C)   Ht 5' 4\" (1 626 m)   Wt 66 7 kg (147 lb)   SpO2 96%   BMI 25 23 kg/m²     Physical Exam  Constitutional:       " Appearance: Normal appearance  She is well-developed  HENT:      Head: Normocephalic and atraumatic  Right Ear: Tympanic membrane, ear canal and external ear normal       Left Ear: Tympanic membrane, ear canal and external ear normal       Nose: Nose normal       Mouth/Throat:      Mouth: Mucous membranes are moist       Pharynx: Oropharynx is clear  Eyes:      Pupils: Pupils are equal, round, and reactive to light  Neck:      Thyroid: No thyromegaly  Cardiovascular:      Rate and Rhythm: Normal rate and regular rhythm  Heart sounds: Normal heart sounds  Pulmonary:      Effort: Pulmonary effort is normal       Breath sounds: Normal breath sounds  Abdominal:      General: Bowel sounds are normal       Palpations: Abdomen is soft  There is no mass  Tenderness: There is no abdominal tenderness  Musculoskeletal:         General: Normal range of motion  Cervical back: Neck supple  Lymphadenopathy:      Cervical: No cervical adenopathy  Skin:     General: Skin is warm and dry  Neurological:      Mental Status: She is alert     Psychiatric:         Mood and Affect: Mood normal          Behavior: Behavior normal        Desi Gomez MD

## 2023-04-06 NOTE — LETTER
2023     MD Cecille Anaya [address]     Patient: Abdirahman Mean   YOB: 1945   Date of Visit: 2023       Dear Dr Ted Mohr:    Thank you for referring Barney Crane to me for evaluation  Below are my notes for this consultation  If you have questions, please do not hesitate to call me  I look forward to following your patient along with you  Patient is cleared for surgery  Sincerely,        Rozina Desai MD        CC: No Recipients  Rozina Desai MD  2023  2:58 PM  Incomplete  Name: Abdirahman Mean      : 1945      MRN: [id number]  Encounter Provider: Rozina Desai MD  Encounter Date: 2023   Encounter department: [place] [address] [address] [place] [address] [address]   Patient is cleared for the proposed surgery  1  Preoperative examination    2  Osteoarthritis of shoulder, unspecified laterality, unspecified osteoarthritis type    3  Primary hypertension  Assessment & Plan:  Continue losartan 50 mg and hydrochlorothiazide 12 5 mg daily      4  Hypothyroidism, unspecified type  Assessment & Plan:  Continue [place] Thyroid 90 mg daily    Orders:  -     TSH, 3rd generation with Free T4 reflex; Future    5  Uncomplicated asthma, unspecified asthma severity, unspecified whether persistent  Assessment & Plan:  Continue Trelegy and      6  Stage [facility]    7  Prediabetes  -     Hemoglobin A1C; Future    8  Hyperlipidemia, unspecified hyperlipidemia type  Assessment & Plan:  Continue Nexletol 180 mg daily    Orders:  -     CBC and differential; Future  -     Comprehensive metabolic panel; Future  -     Lipid panel; Future    9  Gout, unspecified cause, unspecified chronicity, unspecified site    10  Malignant neoplasm of female breast, unspecified estrogen receptor status, unspecified laterality, unspecified site of breast ([facility] [address] )    11   Encounter for screening mammogram for breast cancer         Subjective      Patient comes in for preoperative clearance for total shoulder arthroplasty  Review of Systems   Constitutional: Negative  HENT: Negative  Eyes: Negative  Respiratory: Negative  Cardiovascular: Negative  Gastrointestinal: Negative  Endocrine: Negative  Genitourinary: Negative  Musculoskeletal: Positive for arthralgias  Allergic/Immunologic: Negative  Neurological: Negative  Hematological: Negative          Current Outpatient Medications on File Prior to Visit   Medication Sig   • APPLE CIDER VINEGAR PO apple cider vinegar   • cetirizine (ZyrTEC) 10 mg tablet Take 10 mg by mouth daily   • Cholecalciferol (Vitamin D3) 1 25 MG (25548 UT) CAPS Vitamin D3   • CVS TURMERIC CURCUMIN PO Curcumin   • hydrochlorothiazide (HYDRODIURIL) 12 5 mg tablet Take 1 tablet (12 5 mg total) by mouth daily   • ibuprofen (MOTRIN) 400 mg tablet Take 1 tablet (400 mg total) by mouth 3 (three) times a day as needed for mild pain   • KRILL OIL PO krill oil   • losartan (COZAAR) 50 mg tablet Take 1 tablet (50 mg total) by mouth daily   • montelukast (SINGULAIR) 10 mg tablet Take 10 mg by mouth every evening   • Multiple Vitamin (RA ONE DAILY MULTI-VITAMIN PO) Daily Multi-Vitamin   • mupirocin (BACTROBAN) 2 % ointment mupirocin 2 % topical ointment   APPLY TO EACH NARE TWICE A DAY FOR 5 DAYS PRIOR TO SURGERY   • Nexletol 180 MG TABS Take 1 tablet by mouth every morning   • Probiotic Product (PROBIOTIC BLEND PO) Probiotic   • Restasis 0 05 % ophthalmic emulsion INSTILL 1 DROP INTO EACH EYE TWICE DAILY   • SUPER B COMPLEX/C PO Super B Complex   • thyroid ([place] Thyroid) 90 MG tablet Take 1 tablet (90 mg total) by mouth daily   • Trelegy Ellipta 100-62 5-25 MCG/INH inhaler    • triamcinolone (KENALOG) 0 5 % cream Apply topically 3 (three) times a day   • [DISCONTINUED] azithromycin (ZITHROMAX) 250 mg tablet Take 2 tablets today then 1 tablet daily x 4 days   • [DISCONTINUED] "methylPREDNISolone 4 MG tablet therapy pack Use as directed on package   • [DISCONTINUED] tobramycin-dexamethasone (TOBRADEX) ophthalmic suspension INSTILL 1 DROP THREE TIMES DAILY INTO EACH EYE   • EPINEPHrine (EpiPen 2-Ever) 0 3 mg/0 3 mL SOAJ Inject 0 3 mL (0 3 mg total) into a muscle once for 1 dose       Objective     /86 (BP Location: Left arm, Patient Position: Sitting, Cuff Size: Adult)   Pulse 94   Temp (!) 96 9 °F (36 1 °C)   Ht 5' 4\" (1 626 m)   Wt 66 7 kg (147 lb)   SpO2 96%   BMI 25 23 kg/m²     Physical Exam  Constitutional:       Appearance: Normal appearance  She is well-developed  HENT:      Head: Normocephalic and atraumatic  Right Ear: Tympanic membrane, ear canal and external ear normal       Left Ear: Tympanic membrane, ear canal and external ear normal       Nose: Nose normal       Mouth/Throat:      Mouth: Mucous membranes are moist       Pharynx: Oropharynx is clear  Eyes:      Pupils: Pupils are equal, round, and reactive to light  Neck:      Thyroid: No thyromegaly  Cardiovascular:      Rate and Rhythm: Normal rate and regular rhythm  Heart sounds: Normal heart sounds  Pulmonary:      Effort: Pulmonary effort is normal       Breath sounds: Normal breath sounds  Abdominal:      General: Bowel sounds are normal       Palpations: Abdomen is soft  There is no mass  Tenderness: There is no abdominal tenderness  Musculoskeletal:         General: Normal range of motion  Cervical back: Neck supple  Lymphadenopathy:      Cervical: No cervical adenopathy  Skin:     General: Skin is warm and dry  Neurological:      Mental Status: She is alert     Psychiatric:         Mood and Affect: Mood normal          Behavior: Behavior normal        MD Michael Hernandez MD  2023  2:24 PM  Sign when Signing Visit  Name: Jose Miguel Moore      : 1945      MRN: [id number]  Encounter Provider: Michael Handy MD  Encounter Date: 2023   Encounter " department: [facility] [address]     {There are no diagnoses linked to this encounter   (Refresh or delete this SmartLink)}       Subjective      HPI  Review of Systems    Current Outpatient Medications on File Prior to Visit   Medication Sig   • APPLE CIDER VINEGAR PO apple cider vinegar   • azithromycin (ZITHROMAX) 250 mg tablet Take 2 tablets today then 1 tablet daily x 4 days   • cetirizine (ZyrTEC) 10 mg tablet Take 10 mg by mouth daily   • Cholecalciferol (Vitamin D3) 1 25 MG (02649 UT) CAPS Vitamin D3   • CVS TURMERIC CURCUMIN PO Curcumin   • hydrochlorothiazide (HYDRODIURIL) 12 5 mg tablet Take 1 tablet (12 5 mg total) by mouth daily   • ibuprofen (MOTRIN) 400 mg tablet Take 1 tablet (400 mg total) by mouth 3 (three) times a day as needed for mild pain   • KRILL OIL PO krill oil   • losartan (COZAAR) 50 mg tablet Take 1 tablet (50 mg total) by mouth daily   • methylPREDNISolone 4 MG tablet therapy pack Use as directed on package   • montelukast (SINGULAIR) 10 mg tablet Take 10 mg by mouth every evening   • Multiple Vitamin (RA ONE DAILY MULTI-VITAMIN PO) Daily Multi-Vitamin   • mupirocin (BACTROBAN) 2 % ointment mupirocin 2 % topical ointment   APPLY TO EACH NARE TWICE A DAY FOR 5 DAYS PRIOR TO SURGERY   • Nexletol 180 MG TABS Take 1 tablet by mouth every morning   • Probiotic Product (PROBIOTIC BLEND PO) Probiotic   • Restasis 0 05 % ophthalmic emulsion INSTILL 1 DROP INTO EACH EYE TWICE DAILY   • SUPER B COMPLEX/C PO Super B Complex   • thyroid ([place] Thyroid) 90 MG tablet Take 1 tablet (90 mg total) by mouth daily   • tobramycin-dexamethasone (TOBRADEX) ophthalmic suspension INSTILL 1 DROP THREE TIMES DAILY INTO EACH EYE   • Trelegy Ellipta 100-62 5-25 MCG/INH inhaler    • triamcinolone (KENALOG) 0 5 % cream Apply topically 3 (three) times a day   • EPINEPHrine (EpiPen 2-Ever) 0 3 mg/0 3 mL SOAJ Inject 0 3 mL (0 3 mg total) into a muscle once for 1 "dose       Objective     /86 (BP Location: Left arm, Patient Position: Sitting, Cuff Size: Adult)   Pulse 94   Temp (!) 96 9 °F (36 1 °C)   Ht 5' 4\" (1 626 m)   Wt 66 7 kg (147 lb)   SpO2 96%   BMI 25 23 kg/m²     Physical Exam  Ibeth Mack MD    "

## 2023-04-07 ENCOUNTER — TELEPHONE (OUTPATIENT)
Dept: FAMILY MEDICINE CLINIC | Facility: CLINIC | Age: 78
End: 2023-04-07

## 2023-04-07 NOTE — TELEPHONE ENCOUNTER
Per provider's request - Signed pre-op clearance form and chart notes faxed over to [id number] electronically via Epic on 04/07/2023, by me  Confirmation received  Copy attached to this note / scanned by me

## 2023-04-19 PROBLEM — J44.9 COPD, MODERATE (HCC): Status: ACTIVE | Noted: 2023-04-19

## 2023-04-21 ENCOUNTER — APPOINTMENT (OUTPATIENT)
Dept: RADIOLOGY | Facility: HOSPITAL | Age: 78
End: 2023-04-21

## 2023-04-21 ENCOUNTER — HOSPITAL ENCOUNTER (OUTPATIENT)
Facility: HOSPITAL | Age: 78
Setting detail: OUTPATIENT SURGERY
Discharge: HOME | End: 2023-04-22
Attending: ORTHOPAEDIC SURGERY | Admitting: ORTHOPAEDIC SURGERY

## 2023-04-21 DIAGNOSIS — M19.211 OTHER SECONDARY OSTEOARTHRITIS OF RIGHT SHOULDER: ICD-10-CM

## 2023-04-21 DEVICE — IMPLANTABLE DEVICE
Type: IMPLANTABLE DEVICE | Site: SHOULDER | Status: FUNCTIONAL
Brand: TRABECULAR METAL®

## 2023-04-21 DEVICE — IMPLANTABLE DEVICE
Type: IMPLANTABLE DEVICE | Site: SHOULDER | Status: FUNCTIONAL
Brand: ANATOMICAL SHOULDER™

## 2023-04-21 DEVICE — IMPLANTABLE DEVICE: Type: IMPLANTABLE DEVICE | Site: SHOULDER | Status: FUNCTIONAL

## 2023-04-21 DEVICE — SMARTSET HV HIGH VISCOSITY BONE CEMENT 40G
Type: IMPLANTABLE DEVICE | Site: SHOULDER | Status: FUNCTIONAL
Brand: SMARTSET

## 2023-04-21 RX ORDER — ONDANSETRON 2 MG/ML
4 INJECTION INTRAMUSCULAR; INTRAVENOUS ONCE AS NEEDED
Status: DISCONTINUED | OUTPATIENT
Start: 2023-04-21 | End: 2023-04-21 | Stop reason: HOSPADM

## 2023-04-21 RX ORDER — DOCUSATE SODIUM 100 MG/1
100 CAPSULE, LIQUID FILLED ORAL 2 TIMES DAILY
Status: DISCONTINUED | OUTPATIENT
Start: 2023-04-21 | End: 2023-04-22 | Stop reason: HOSPADM

## 2023-04-21 RX ORDER — LOSARTAN POTASSIUM 50 MG/1
50 TABLET ORAL DAILY
Status: DISCONTINUED | OUTPATIENT
Start: 2023-04-21 | End: 2023-04-22 | Stop reason: HOSPADM

## 2023-04-21 RX ORDER — SODIUM CHLORIDE 9 MG/ML
100 INJECTION, SOLUTION INTRAVENOUS CONTINUOUS
Status: DISCONTINUED | OUTPATIENT
Start: 2023-04-21 | End: 2023-04-22 | Stop reason: HOSPADM

## 2023-04-21 RX ORDER — HYDROCODONE BITARTRATE AND ACETAMINOPHEN 5; 325 MG/1; MG/1
1 TABLET ORAL EVERY 4 HOURS PRN
Status: DISCONTINUED | OUTPATIENT
Start: 2023-04-21 | End: 2023-04-22 | Stop reason: HOSPADM

## 2023-04-21 RX ORDER — ASPIRIN 325 MG
325 TABLET ORAL DAILY
Status: DISCONTINUED | OUTPATIENT
Start: 2023-04-21 | End: 2023-04-22 | Stop reason: HOSPADM

## 2023-04-21 RX ORDER — LEVOTHYROXINE SODIUM 0.07 MG/1
150 TABLET ORAL
Status: DISCONTINUED | OUTPATIENT
Start: 2023-04-22 | End: 2023-04-22 | Stop reason: HOSPADM

## 2023-04-21 RX ORDER — FENTANYL CITRATE/PF 50 MCG/ML
25 SYRINGE (ML) INJECTION
Status: DISCONTINUED | OUTPATIENT
Start: 2023-04-21 | End: 2023-04-21 | Stop reason: HOSPADM

## 2023-04-21 RX ORDER — ACETAMINOPHEN 325 MG/1
650 TABLET ORAL EVERY 6 HOURS PRN
Status: DISCONTINUED | OUTPATIENT
Start: 2023-04-21 | End: 2023-04-22 | Stop reason: HOSPADM

## 2023-04-21 RX ORDER — BISACODYL 10 MG
10 SUPPOSITORY, RECTAL RECTAL DAILY PRN
Status: DISCONTINUED | OUTPATIENT
Start: 2023-04-21 | End: 2023-04-22 | Stop reason: HOSPADM

## 2023-04-21 RX ORDER — HYDROMORPHONE HCL/PF 1 MG/ML
0.5 SYRINGE (ML) INJECTION EVERY 2 HOUR PRN
Status: DISCONTINUED | OUTPATIENT
Start: 2023-04-21 | End: 2023-04-22 | Stop reason: HOSPADM

## 2023-04-21 RX ORDER — METHOCARBAMOL 500 MG/1
500 TABLET, FILM COATED ORAL EVERY 6 HOURS SCHEDULED
Status: DISCONTINUED | OUTPATIENT
Start: 2023-04-21 | End: 2023-04-22 | Stop reason: HOSPADM

## 2023-04-21 RX ORDER — MONTELUKAST SODIUM 10 MG/1
10 TABLET ORAL EVERY EVENING
Status: DISCONTINUED | OUTPATIENT
Start: 2023-04-21 | End: 2023-04-22 | Stop reason: HOSPADM

## 2023-04-21 RX ORDER — ONDANSETRON 2 MG/ML
4 INJECTION INTRAMUSCULAR; INTRAVENOUS EVERY 6 HOURS PRN
Status: DISCONTINUED | OUTPATIENT
Start: 2023-04-21 | End: 2023-04-22 | Stop reason: HOSPADM

## 2023-04-21 RX ORDER — MAGNESIUM HYDROXIDE 1200 MG/15ML
LIQUID ORAL AS NEEDED
Status: DISCONTINUED | OUTPATIENT
Start: 2023-04-21 | End: 2023-04-21 | Stop reason: HOSPADM

## 2023-04-21 RX ORDER — CALCIUM CARBONATE 200(500)MG
1000 TABLET,CHEWABLE ORAL DAILY PRN
Status: DISCONTINUED | OUTPATIENT
Start: 2023-04-21 | End: 2023-04-22 | Stop reason: HOSPADM

## 2023-04-21 RX ORDER — LORATADINE 10 MG/1
10 TABLET ORAL DAILY
Status: DISCONTINUED | OUTPATIENT
Start: 2023-04-21 | End: 2023-04-22 | Stop reason: HOSPADM

## 2023-04-21 RX ORDER — HYDROCHLOROTHIAZIDE 12.5 MG/1
12.5 TABLET ORAL DAILY
Status: DISCONTINUED | OUTPATIENT
Start: 2023-04-21 | End: 2023-04-22 | Stop reason: HOSPADM

## 2023-04-21 RX ORDER — ALBUTEROL SULFATE 90 UG/1
2 AEROSOL, METERED RESPIRATORY (INHALATION) EVERY 4 HOURS PRN
Status: DISCONTINUED | OUTPATIENT
Start: 2023-04-21 | End: 2023-04-22 | Stop reason: HOSPADM

## 2023-04-21 RX ORDER — SODIUM CHLORIDE 9 MG/ML
125 INJECTION, SOLUTION INTRAVENOUS CONTINUOUS
Status: DISCONTINUED | OUTPATIENT
Start: 2023-04-21 | End: 2023-04-21

## 2023-04-21 RX ORDER — VANCOMYCIN HYDROCHLORIDE 1 G/200ML
1000 INJECTION, SOLUTION INTRAVENOUS EVERY 12 HOURS
Status: COMPLETED | OUTPATIENT
Start: 2023-04-21 | End: 2023-04-22

## 2023-04-21 RX ORDER — VANCOMYCIN HYDROCHLORIDE 1 G/200ML
15 INJECTION, SOLUTION INTRAVENOUS ONCE
Status: COMPLETED | OUTPATIENT
Start: 2023-04-21 | End: 2023-04-21

## 2023-04-21 RX ADMIN — LORATADINE 10 MG: 10 TABLET ORAL at 14:50

## 2023-04-21 RX ADMIN — ACETAMINOPHEN 325MG 650 MG: 325 TABLET ORAL at 14:50

## 2023-04-21 RX ADMIN — SODIUM CHLORIDE 125 ML/HR: 0.9 INJECTION, SOLUTION INTRAVENOUS at 08:30

## 2023-04-21 RX ADMIN — SODIUM CHLORIDE 100 ML/HR: 0.9 INJECTION, SOLUTION INTRAVENOUS at 15:12

## 2023-04-21 RX ADMIN — VANCOMYCIN HYDROCHLORIDE 1000 MG: 1 INJECTION, SOLUTION INTRAVENOUS at 10:33

## 2023-04-21 RX ADMIN — METHOCARBAMOL 500 MG: 500 TABLET ORAL at 14:50

## 2023-04-21 RX ADMIN — DOCUSATE SODIUM 100 MG: 100 CAPSULE, LIQUID FILLED ORAL at 17:41

## 2023-04-21 RX ADMIN — MONTELUKAST 10 MG: 10 TABLET, FILM COATED ORAL at 17:41

## 2023-04-21 RX ADMIN — HYDROCHLOROTHIAZIDE 12.5 MG: 12.5 TABLET ORAL at 14:50

## 2023-04-21 RX ADMIN — SODIUM CHLORIDE 125 ML/HR: 0.9 INJECTION, SOLUTION INTRAVENOUS at 12:59

## 2023-04-21 RX ADMIN — METHOCARBAMOL 500 MG: 500 TABLET ORAL at 23:14

## 2023-04-21 RX ADMIN — ASPIRIN 325 MG ORAL TABLET 325 MG: 325 PILL ORAL at 14:50

## 2023-04-21 RX ADMIN — VANCOMYCIN HYDROCHLORIDE 1000 MG: 1 INJECTION, SOLUTION INTRAVENOUS at 21:47

## 2023-04-21 RX ADMIN — LOSARTAN POTASSIUM 50 MG: 50 TABLET, FILM COATED ORAL at 14:50

## 2023-04-21 NOTE — CONSULTS
[address] [place] [address]  Consult  Name: Rahul Mesa 70 y o  female I MRN: [id number]  Unit/Bed#: E2 -01 I Date of Admission: 4/21/2023   Date of Service: 4/21/2023 I Hospital Day: 0    Inpatient consult to Internal Medicine  Consult performed by: Grant Roberts MD  Consult ordered by: Clif Whitehead MD        Addendum:  Asthma:  Patient reporting shortness of breath  Prn inhalers given  Feeling much better  Assessment/Plan   * Osteoarthritis of right shoulder  Assessment & Plan  72-year-old female admitted under the orthopedic service for the management of her right shoulder osteoarthritis  She is status post reverse total shoulder replacement right  · Dispo, DVT prophylaxis, and rest of management per primary team    Stage 3a chronic kidney disease [facility])  Assessment & Plan  Lab Results   Component Value Date    EGFR 63 03/29/2023    EGFR 68 02/10/2023    EGFR 55 08/17/2022    CREATININE 0 89 03/29/2023    CREATININE 0 84 02/10/2023    CREATININE 0 99 08/17/2022     Stable  Does not meet OPAL criteria    Hypothyroidism  Assessment & Plan  Continue thyroid medication    Hypertension  Assessment & Plan  Controlled  · Continue hydrochlorothiazide and losartan             VTE Prophylaxis: per primary team    Recommendations for Discharge:  · Resume home medications on discharge  · Dispo per primary team      Collaboration of Care: Were Recommendations Directly Discussed with Primary Treatment Team? - No     History of Present Illness:    Rahul Mesa is a 70 y o  female who is originally admitted to the orthopedic service due to right shoulder osteoarthritis  We are consulted for the management of her medical comorbidities  Patient 72-year-old female with history of hypertension and hypothyroidism  She reports a chronic history of right shoulder osteoarthritis  She underwent  reverse total shoulder replacement right today and tolerated the procedure postoperatively      Currently without any acute complaints  Review of Systems:    Review of Systems   Constitutional: Negative for chills and fever  HENT: Negative for congestion  Respiratory: Negative for cough, shortness of breath and wheezing  Cardiovascular: Negative for chest pain and palpitations  Gastrointestinal: Negative for abdominal pain, diarrhea, nausea and vomiting  Endocrine: Negative for polyuria  Genitourinary: Negative for dysuria  Musculoskeletal: Positive for arthralgias  Skin: Negative for color change and pallor  Neurological: Negative for weakness and headaches  Psychiatric/Behavioral: Negative for agitation and confusion  Past Medical and Surgical History:     Past Medical History:   Diagnosis Date   • Asthma    • Cancer [facility])     breast   • Disease of thyroid gland    • Hyperlipidemia    • Hypertension        Past Surgical History:   Procedure Laterality Date   • BACK SURGERY      L6! Correct--L6  • BREAST BIOPSY      reconstruction   • HAND SURGERY Right     Thumb/Wrist Prosthesis   • LUMBAR LAMINECTOMY     • MASTECTOMY     • NECK SURGERY      C4,5,6       Meds/Allergies:    all medications and allergies reviewed    Allergies:    Allergies   Allergen Reactions   • Oxycontin [Oxycodone Hcl] Dizziness   • Amoxicillin Rash   • Cefprozil Rash   • Cephalexin Rash   • Ciprofloxacin Rash   • Doxycycline Rash   • Erythromycin Rash   • Levofloxacin Rash   • Penicillins Rash       Social History:     Marital Status:     Substance Use History:   Social History     Substance and Sexual Activity   Alcohol Use Yes   • Alcohol/week: 14 0 standard drinks   • Types: 14 Glasses of wine per week    Comment: 1-2 daily     Social History     Tobacco Use   Smoking Status Former   • Types: Cigarettes   • Quit date: 1986   • Years since quittin 4   Smokeless Tobacco Never     Social History     Substance and Sexual Activity   Drug Use Yes   • Types: Marijuana    Comment: medical for sleep "      Family History:    Family History   Problem Relation Age of Onset   • Cancer Mother    • Diabetes Mother    • Heart disease Mother    • Cancer Father        Physical Exam:     Vitals:   Blood Pressure: 133/76 (04/21/23 1513)  Pulse: 81 (04/21/23 1513)  Temperature: (!) 97 3 °F (36 3 °C) (04/21/23 1513)  Temp Source: Temporal (04/21/23 1513)  Respirations: 15 (04/21/23 1447)  Height: 5' 4\" (162 6 cm) (04/21/23 0803)  Weight - Scale: 65 5 kg (144 lb 6 4 oz) (04/21/23 0803)  SpO2: 94 % (04/21/23 1513)    Physical Exam  Vitals reviewed  Constitutional:       General: She is not in acute distress  HENT:      Head: Normocephalic  Nose: Nose normal       Mouth/Throat:      Mouth: Mucous membranes are moist    Eyes:      General: No scleral icterus  Cardiovascular:      Rate and Rhythm: Normal rate and regular rhythm  Pulmonary:      Effort: Pulmonary effort is normal  No respiratory distress  Breath sounds: No wheezing  Abdominal:      General: There is no distension  Palpations: Abdomen is soft  Tenderness: There is no abdominal tenderness  Musculoskeletal:      Right lower leg: No edema  Left lower leg: No edema  Comments: Right shoulder sling   Skin:     General: Skin is warm  Neurological:      Mental Status: She is alert and oriented to person, place, and time  Psychiatric:         Mood and Affect: Mood normal          Behavior: Behavior normal        Additional Data:     Lab Results: I have personally reviewed pertinent reports  Lab Results   Component Value Date/Time    HGBA1C 5 8 (H) 03/29/2023 09:04 AM    HGBA1C 5 8 (H) 02/10/2023 09:25 AM    HGBA1C 6 1 (H) 01/19/2023 10:16 AM    HGBA1C 6 0 (H) 08/17/2022 09:20 AM    HGBA1C 6 2 (H) 01/25/2022 08:37 AM    HGBA1C 6 1 (H) 06/01/2021 08:59 AM               Imaging: I have personally reviewed pertinent reports        XR shoulder right 1 view    (Results Pending)       EKG, Pathology, and Other " Studies Reviewed on Admission:   · EKG: none    ** Please Note: This note has been constructed using a voice recognition system   **

## 2023-04-21 NOTE — ASSESSMENT & PLAN NOTE
Lab Results   Component Value Date    EGFR 63 03/29/2023    EGFR 68 02/10/2023    EGFR 55 08/17/2022    CREATININE 0 89 03/29/2023    CREATININE 0 84 02/10/2023    CREATININE 0 99 08/17/2022     Stable    Does not meet OPAL criteria

## 2023-04-21 NOTE — PLAN OF CARE
Problem: MOBILITY - ADULT  Goal: Maintain or return to baseline ADL function  Description: INTERVENTIONS:  -  Assess patient's ability to carry out ADLs; assess patient's baseline for ADL function and identify physical deficits which impact ability to perform ADLs (bathing, care of mouth/teeth, toileting, grooming, dressing, etc )  - Assess/evaluate cause of self-care deficits   - Assess range of motion  - Assess patient's mobility; develop plan if impaired  - Assess patient's need for assistive devices and provide as appropriate  - Encourage maximum independence but intervene and supervise when necessary  - Involve family in performance of ADLs  - Assess for home care needs following discharge   - Consider OT consult to assist with ADL evaluation and planning for discharge  - Provide patient education as appropriate  Outcome: Progressing  Goal: Maintains/Returns to pre admission functional level  Description: INTERVENTIONS:  - Perform BMAT or MOVE assessment daily    - Set and communicate daily mobility goal to care team and patient/family/caregiver  - Collaborate with rehabilitation services on mobility goals if consulted  - Perform Range of Motion 3 times a day  - Reposition patient every 2 hours    - Dangle patient 3 times a day  - Stand patient 3 times a day  - Ambulate patient 3 times a day  - Out of bed to chair 3 times a day   - Out of bed for meals 3 times a day  - Out of bed for toileting  - Record patient progress and toleration of activity level   Outcome: Progressing     Problem: PAIN - ADULT  Goal: Verbalizes/displays adequate comfort level or baseline comfort level  Description: Interventions:  - Encourage patient to monitor pain and request assistance  - Assess pain using appropriate pain scale  - Administer analgesics based on type and severity of pain and evaluate response  - Implement non-pharmacological measures as appropriate and evaluate response  - Consider cultural and social influences on pain and pain management  - Notify physician/advanced practitioner if interventions unsuccessful or patient reports new pain  Outcome: Progressing     Problem: INFECTION - ADULT  Goal: Absence or prevention of progression during hospitalization  Description: INTERVENTIONS:  - Assess and monitor for signs and symptoms of infection  - Monitor lab/diagnostic results  - Monitor all insertion sites, i e  indwelling lines, tubes, and drains  - Monitor endotracheal if appropriate and nasal secretions for changes in amount and color  - [place] appropriate cooling/warming therapies per order  - Administer medications as ordered  - Instruct and encourage patient and family to use good hand hygiene technique  - Identify and instruct in appropriate isolation precautions for identified infection/condition  Outcome: Progressing  Goal: Absence of fever/infection during neutropenic period  Description: INTERVENTIONS:  - Monitor WBC    Outcome: Progressing     Problem: SAFETY ADULT  Goal: Maintain or return to baseline ADL function  Description: INTERVENTIONS:  -  Assess patient's ability to carry out ADLs; assess patient's baseline for ADL function and identify physical deficits which impact ability to perform ADLs (bathing, care of mouth/teeth, toileting, grooming, dressing, etc )  - Assess/evaluate cause of self-care deficits   - Assess range of motion  - Assess patient's mobility; develop plan if impaired  - Assess patient's need for assistive devices and provide as appropriate  - Encourage maximum independence but intervene and supervise when necessary  - Involve family in performance of ADLs  - Assess for home care needs following discharge   - Consider OT consult to assist with ADL evaluation and planning for discharge  - Provide patient education as appropriate  Outcome: Progressing  Goal: Maintains/Returns to pre admission functional level  Description: INTERVENTIONS:  - Perform BMAT or MOVE assessment daily    - Set and communicate daily mobility goal to care team and patient/family/caregiver  - Collaborate with rehabilitation services on mobility goals if consulted  - Perform Range of Motion 3 times a day  - Reposition patient every 3 hours    - Dangle patient 2 times a day  - Stand patient 3 times a day  - Ambulate patient 3 times a day  - Out of bed to chair 3 times a day   - Out of bed for meals 3 times a day  - Out of bed for toileting  - Record patient progress and toleration of activity level   Outcome: Progressing  Goal: Patient will remain free of falls  Description: INTERVENTIONS:  - Educate patient/family on patient safety including physical limitations  - Instruct patient to call for assistance with activity   - Consult OT/PT to assist with strengthening/mobility   - Keep Call bell within reach  - Keep bed low and locked with side rails adjusted as appropriate  - Keep care items and personal belongings within reach  - Initiate and maintain comfort rounds  - Make Fall Risk Sign visible to staff  - Offer Toileting every 2 Hours, in advance of need  - Initiate/Maintain Bed alarm  - Apply yellow socks and bracelet for high fall risk patients  - Consider moving patient to room near nurses station  Outcome: Progressing     Problem: DISCHARGE PLANNING  Goal: Discharge to home or other facility with appropriate resources  Description: INTERVENTIONS:  - Identify barriers to discharge w/patient and caregiver  - Arrange for needed discharge resources and transportation as appropriate  - Identify discharge learning needs (meds, wound care, etc )  - Arrange for interpretive services to assist at discharge as needed  - Refer to Case Management Department for coordinating discharge planning if the patient needs post-hospital services based on physician/advanced practitioner order or complex needs related to functional status, cognitive ability, or social support system  Outcome: Progressing     Problem: Knowledge Deficit  Goal: Patient/family/caregiver demonstrates understanding of disease process, treatment plan, medications, and discharge instructions  Description: Complete learning assessment and assess knowledge base    Interventions:  - Provide teaching at level of understanding  - Provide teaching via preferred learning methods  Outcome: Progressing

## 2023-04-21 NOTE — OP NOTE
OPERATIVE REPORT  PATIENT NAME: Jesse Dean    :  1945  MRN: [id number]  Pt Location: [facility] OR ROOM 02    SURGERY DATE: 2023    Surgeon(s) and Role:     * Renata Botello MD - Primary    Preop Diagnosis:  Secondary osteoarthritis of right shoulder [M19 211]    Post-Op Diagnosis Codes:     * Secondary osteoarthritis of right shoulder [M19 211]    Procedure(s):  Right - Reverse total shoulder replacement    Specimen(s):  * No specimens in log *    Estimated Blood Loss:   Minimal    Drains:  * No LDAs found *    Anesthesia Type:   General w/ Interscalene Block    Operative Indications:  Secondary osteoarthritis of right shoulder [M19 211]      Operative Findings:  Severe rotator cuff tear arthropathy    Complications:   None    Procedure and Technique:  The patient was seen and evaluated in the preop holding area  I identified the right shoulder as the operative site  She confirmed the right shoulder was the operative site  I marked the right shoulder with my initials  A right interscalene block was performed by anesthesia  Once in the operating theater, she was placed under general anesthesia and a general endotracheal tube was placed  She was positioned in the modified beachchair position with bony prominences padded  The right arm and shoulder were prepped and draped in usual orthopedic fashion  Timeout was correct for beginning the case  Incision was then made from the coracoid extending distally  This was carried down to the cephalic vein was identified  Flaps were raised circumferentially  The deltopectoral interval was developed distally and carried proximally  The cephalic vein was retracted laterally  The upper centimeter of the pectoralis major was released  The conjoined tendon was identified and retracted medially  There was noted to be full-thickness tearing of the supraspinatus and infraspinatus  Greater than two thirds of the supra subscapularis was torn    The subscapularis remaining fibers were released a posterior release was performed  The humeral head cut was then performed  The glenoid was exposed and circumferentially the capsule and labrum were removed and released  We then prepared the glenoid for a 36 baseplate  The baseplate was impacted into place and secured with to 30 mm screws inferiorly and superiorly  Locking caps were placed  36 glenosphere was placed  Attention was then taken to the humerus  The humeral shaft was then was reamed to accept a size 12 stem  The size 12 stem was impacted but noted to be micromotion  We then cemented the size 12 stem into place  Once the cemented cured we then trialed the shoulder  A +9 spacer with a +6 poly was then selected  The final components were impacted the place  The shoulder was reduced and found to have stability throughout the range of motion  Copious irrigation had been used throughout the procedure  Final irrigation was performed  The deltopectoral interval was allowed to close on itself  2-0 Vicryl was used to close deep layer of skin  A skin sealant was placed  A sterile dressing was applied  Patient was placed in a sling  She was awoken taken recovery stable condition  I was present for the entire procedure      Patient Disposition:  PACU         SIGNATURE: Neva Covarrubias MD  DATE: April 21, 2023  TIME: 12:13 PM

## 2023-04-21 NOTE — INTERVAL H&P NOTE
H&P reviewed  After examining the patient I find no changes in the patients condition since the H&P had been written      Vitals:    04/21/23 1042   BP: 116/73   Pulse: 77   Resp:    Temp:    SpO2: 99%

## 2023-04-21 NOTE — ASSESSMENT & PLAN NOTE
72-year-old female admitted under the orthopedic service for the management of her right shoulder osteoarthritis  She is status post reverse total shoulder replacement right    · Dispo, DVT prophylaxis, and rest of management per primary team

## 2023-04-22 VITALS
HEART RATE: 88 BPM | HEIGHT: 64 IN | TEMPERATURE: 97.8 F | RESPIRATION RATE: 18 BRPM | SYSTOLIC BLOOD PRESSURE: 112 MMHG | WEIGHT: 144.4 LBS | DIASTOLIC BLOOD PRESSURE: 67 MMHG | OXYGEN SATURATION: 96 % | BODY MASS INDEX: 24.65 KG/M2

## 2023-04-22 RX ADMIN — METHOCARBAMOL 500 MG: 500 TABLET ORAL at 06:38

## 2023-04-22 RX ADMIN — ACETAMINOPHEN 325MG 650 MG: 325 TABLET ORAL at 03:19

## 2023-04-22 RX ADMIN — LORATADINE 10 MG: 10 TABLET ORAL at 08:09

## 2023-04-22 RX ADMIN — DOCUSATE SODIUM 100 MG: 100 CAPSULE, LIQUID FILLED ORAL at 08:09

## 2023-04-22 RX ADMIN — ASPIRIN 325 MG ORAL TABLET 325 MG: 325 PILL ORAL at 08:09

## 2023-04-22 RX ADMIN — HYDROCHLOROTHIAZIDE 12.5 MG: 12.5 TABLET ORAL at 08:10

## 2023-04-22 RX ADMIN — LOSARTAN POTASSIUM 50 MG: 50 TABLET, FILM COATED ORAL at 08:10

## 2023-04-22 RX ADMIN — FLUTICASONE FUROATE, UMECLIDINIUM BROMIDE AND VILANTEROL TRIFENATATE 1 PUFF: 100; 62.5; 25 POWDER RESPIRATORY (INHALATION) at 08:10

## 2023-04-22 NOTE — ASSESSMENT & PLAN NOTE
45-year-old female admitted under the orthopedic service for the management of her right shoulder osteoarthritis  She is status post reverse total shoulder replacement right    · Dispo, DVT prophylaxis, and rest of management per primary team

## 2023-04-22 NOTE — PLAN OF CARE
Problem: MOBILITY - ADULT  Goal: Maintain or return to baseline ADL function  Description: INTERVENTIONS:  -  Assess patient's ability to carry out ADLs; assess patient's baseline for ADL function and identify physical deficits which impact ability to perform ADLs (bathing, care of mouth/teeth, toileting, grooming, dressing, etc )  - Assess/evaluate cause of self-care deficits   - Assess range of motion  - Assess patient's mobility; develop plan if impaired  - Assess patient's need for assistive devices and provide as appropriate  - Encourage maximum independence but intervene and supervise when necessary  - Involve family in performance of ADLs  - Assess for home care needs following discharge   - Consider OT consult to assist with ADL evaluation and planning for discharge  - Provide patient education as appropriate  Outcome: Progressing  Goal: Maintains/Returns to pre admission functional level  Description: INTERVENTIONS:  - Perform BMAT or MOVE assessment daily    - Set and communicate daily mobility goal to care team and patient/family/caregiver     - Collaborate with rehabilitation services on mobility goals if consulted  - Out of bed for toileting  - Record patient progress and toleration of activity level   Outcome: Progressing     Problem: PAIN - ADULT  Goal: Verbalizes/displays adequate comfort level or baseline comfort level  Description: Interventions:  - Encourage patient to monitor pain and request assistance  - Assess pain using appropriate pain scale  - Administer analgesics based on type and severity of pain and evaluate response  - Implement non-pharmacological measures as appropriate and evaluate response  - Consider cultural and social influences on pain and pain management  - Notify physician/advanced practitioner if interventions unsuccessful or patient reports new pain  Outcome: Progressing     Problem: INFECTION - ADULT  Goal: Absence or prevention of progression during hospitalization  Description: INTERVENTIONS:  - Assess and monitor for signs and symptoms of infection  - Monitor lab/diagnostic results  - Monitor all insertion sites, i e  indwelling lines, tubes, and drains  - Monitor endotracheal if appropriate and nasal secretions for changes in amount and color  - [place] appropriate cooling/warming therapies per order  - Administer medications as ordered  - Instruct and encourage patient and family to use good hand hygiene technique  - Identify and instruct in appropriate isolation precautions for identified infection/condition  Outcome: Progressing  Goal: Absence of fever/infection during neutropenic period  Description: INTERVENTIONS:  - Monitor WBC    Outcome: Progressing     Problem: SAFETY ADULT  Goal: Maintain or return to baseline ADL function  Description: INTERVENTIONS:  -  Assess patient's ability to carry out ADLs; assess patient's baseline for ADL function and identify physical deficits which impact ability to perform ADLs (bathing, care of mouth/teeth, toileting, grooming, dressing, etc )  - Assess/evaluate cause of self-care deficits   - Assess range of motion  - Assess patient's mobility; develop plan if impaired  - Assess patient's need for assistive devices and provide as appropriate  - Encourage maximum independence but intervene and supervise when necessary  - Involve family in performance of ADLs  - Assess for home care needs following discharge   - Consider OT consult to assist with ADL evaluation and planning for discharge  - Provide patient education as appropriate  Outcome: Progressing  Goal: Maintains/Returns to pre admission functional level  Description: INTERVENTIONS:  - Perform BMAT or MOVE assessment daily    - Set and communicate daily mobility goal to care team and patient/family/caregiver     - Collaborate with rehabilitation services on mobility goals if consulted  - Out of bed for toileting  - Record patient progress and toleration of activity level   Outcome: Progressing  Goal: Patient will remain free of falls  Description: INTERVENTIONS:  - Educate patient/family on patient safety including physical limitations  - Instruct patient to call for assistance with activity   - Consult OT/PT to assist with strengthening/mobility   - Keep Call bell within reach  - Keep bed low and locked with side rails adjusted as appropriate  - Keep care items and personal belongings within reach  - Initiate and maintain comfort rounds  - Make Fall Risk Sign visible to staff  - Apply yellow socks and bracelet for high fall risk patients  - Consider moving patient to room near nurses station  Outcome: Progressing     Problem: DISCHARGE PLANNING  Goal: Discharge to home or other facility with appropriate resources  Description: INTERVENTIONS:  - Identify barriers to discharge w/patient and caregiver  - Arrange for needed discharge resources and transportation as appropriate  - Identify discharge learning needs (meds, wound care, etc )  - Arrange for interpretive services to assist at discharge as needed  - Refer to Case Management Department for coordinating discharge planning if the patient needs post-hospital services based on physician/advanced practitioner order or complex needs related to functional status, cognitive ability, or social support system  Outcome: Progressing     Problem: Knowledge Deficit  Goal: Patient/family/caregiver demonstrates understanding of disease process, treatment plan, medications, and discharge instructions  Description: Complete learning assessment and assess knowledge base    Interventions:  - Provide teaching at level of understanding  - Provide teaching via preferred learning methods  Outcome: Progressing

## 2023-04-22 NOTE — PLAN OF CARE
Problem: MOBILITY - ADULT  Goal: Maintain or return to baseline ADL function  Description: INTERVENTIONS:  -  Assess patient's ability to carry out ADLs; assess patient's baseline for ADL function and identify physical deficits which impact ability to perform ADLs (bathing, care of mouth/teeth, toileting, grooming, dressing, etc )  - Assess/evaluate cause of self-care deficits   - Assess range of motion  - Assess patient's mobility; develop plan if impaired  - Assess patient's need for assistive devices and provide as appropriate  - Encourage maximum independence but intervene and supervise when necessary  - Involve family in performance of ADLs  - Assess for home care needs following discharge   - Consider OT consult to assist with ADL evaluation and planning for discharge  - Provide patient education as appropriate  4/22/2023 1252 by Berlin Green RN  Outcome: Completed  4/22/2023 1251 by Berlin Green RN  Outcome: Progressing  Goal: Maintains/Returns to pre admission functional level  Description: INTERVENTIONS:  - Perform BMAT or MOVE assessment daily    - Set and communicate daily mobility goal to care team and patient/family/caregiver     - Collaborate with rehabilitation services on mobility goals if consulted  - Out of bed for toileting  - Record patient progress and toleration of activity level   4/22/2023 1252 by Berlin Green RN  Outcome: Completed  4/22/2023 1251 by Berlin Green RN  Outcome: Progressing     Problem: PAIN - ADULT  Goal: Verbalizes/displays adequate comfort level or baseline comfort level  Description: Interventions:  - Encourage patient to monitor pain and request assistance  - Assess pain using appropriate pain scale  - Administer analgesics based on type and severity of pain and evaluate response  - Implement non-pharmacological measures as appropriate and evaluate response  - Consider cultural and social influences on pain and pain management  - Notify physician/advanced practitioner if interventions unsuccessful or patient reports new pain  4/22/2023 1252 by Evan Arthur RN  Outcome: Completed  4/22/2023 1251 by Evan Arthur RN  Outcome: Progressing     Problem: INFECTION - ADULT  Goal: Absence or prevention of progression during hospitalization  Description: INTERVENTIONS:  - Assess and monitor for signs and symptoms of infection  - Monitor lab/diagnostic results  - Monitor all insertion sites, i e  indwelling lines, tubes, and drains  - Monitor endotracheal if appropriate and nasal secretions for changes in amount and color  - [place] appropriate cooling/warming therapies per order  - Administer medications as ordered  - Instruct and encourage patient and family to use good hand hygiene technique  - Identify and instruct in appropriate isolation precautions for identified infection/condition  4/22/2023 1252 by Evan Arthur RN  Outcome: Completed  4/22/2023 1251 by Evan Arthur RN  Outcome: Progressing  Goal: Absence of fever/infection during neutropenic period  Description: INTERVENTIONS:  - Monitor WBC    4/22/2023 1252 by Evan Arthur RN  Outcome: Completed  4/22/2023 1251 by Evan Arthur RN  Outcome: Progressing     Problem: SAFETY ADULT  Goal: Maintain or return to baseline ADL function  Description: INTERVENTIONS:  -  Assess patient's ability to carry out ADLs; assess patient's baseline for ADL function and identify physical deficits which impact ability to perform ADLs (bathing, care of mouth/teeth, toileting, grooming, dressing, etc )  - Assess/evaluate cause of self-care deficits   - Assess range of motion  - Assess patient's mobility; develop plan if impaired  - Assess patient's need for assistive devices and provide as appropriate  - Encourage maximum independence but intervene and supervise when necessary  - Involve family in performance of ADLs  - Assess for home care needs following discharge   - Consider OT consult to assist with ADL evaluation and planning for discharge  - Provide patient education as appropriate  4/22/2023 1252 by Carmenza Perez RN  Outcome: Completed  4/22/2023 1251 by Carmenza Perez RN  Outcome: Progressing  Goal: Maintains/Returns to pre admission functional level  Description: INTERVENTIONS:  - Perform BMAT or MOVE assessment daily    - Set and communicate daily mobility goal to care team and patient/family/caregiver     - Collaborate with rehabilitation services on mobility goals if consulted  - Out of bed for toileting  - Record patient progress and toleration of activity level   4/22/2023 1252 by Carmenza Perez RN  Outcome: Completed  4/22/2023 1251 by Carmenza Perez RN  Outcome: Progressing  Goal: Patient will remain free of falls  Description: INTERVENTIONS:  - Educate patient/family on patient safety including physical limitations  - Instruct patient to call for assistance with activity   - Consult OT/PT to assist with strengthening/mobility   - Keep Call bell within reach  - Keep bed low and locked with side rails adjusted as appropriate  - Keep care items and personal belongings within reach  - Initiate and maintain comfort rounds  - Make Fall Risk Sign visible to staff  - Apply yellow socks and bracelet for high fall risk patients  - Consider moving patient to room near nurses station  4/22/2023 1252 by Carmenza Perez RN  Outcome: Completed  4/22/2023 1251 by Carmenza Perez RN  Outcome: Progressing     Problem: DISCHARGE PLANNING  Goal: Discharge to home or other facility with appropriate resources  Description: INTERVENTIONS:  - Identify barriers to discharge w/patient and caregiver  - Arrange for needed discharge resources and transportation as appropriate  - Identify discharge learning needs (meds, wound care, etc )  - Arrange for interpretive services to assist at discharge as needed  - Refer to Case Management Department for coordinating discharge planning if the patient needs post-hospital services based on physician/advanced practitioner order or complex needs related to functional status, cognitive ability, or social support system  4/22/2023 1252 by Claudy Ocampo RN  Outcome: Completed  4/22/2023 1251 by Claudy Ocampo RN  Outcome: Progressing     Problem: Knowledge Deficit  Goal: Patient/family/caregiver demonstrates understanding of disease process, treatment plan, medications, and discharge instructions  Description: Complete learning assessment and assess knowledge base    Interventions:  - Provide teaching at level of understanding  - Provide teaching via preferred learning methods  4/22/2023 1252 by Claudy Ocampo RN  Outcome: Completed  4/22/2023 1251 by Claudy Ocampo RN  Outcome: Progressing

## 2023-04-22 NOTE — CASE MANAGEMENT
Case Management Discharge Planning Note    Patient name Zaina Masterson  Location [facility] /E2 -* MRN [id number]  : 1945 Date 2023       Current Admission Date: 2023  Current Admission Diagnosis:Osteoarthritis of right shoulder   Patient Active Problem List    Diagnosis Date Noted   • COPD, moderate ([facility] ) 2023   • Osteoarthritis of shoulder 2023   • Prepatellar bursitis of right knee 10/31/2022   • Bilateral shoulder pain 2022   • Stage 3a chronic kidney disease ([facility] ) 12/15/2020   • Contact dermatitis    • Lichen sclerosus et atrophicus of the vulva 2019   • Preoperative examination 12/10/2019   • Cataract 12/10/2019   • Nocturnal leg cramps 2019   • Prediabetes 2019   • History of breast cancer 2018   • Mild persistent asthma without complication    • Restrictive lung disease 2018   • History of vulvar dysplasia 2017   • Anemia 2017   • Anxiety 2017   • Gout 2016   • Allergic rhinitis, seasonal 2016   • Hyperuricemia 09/15/2015   • Fatty liver 2015   • Hyperlipidemia 2015   • Hypothyroidism 2015   • Vision disturbance 2015   • Osteoarthritis of right shoulder 2015   • Asthma 2014   • Breast cancer ([facility] ) 2014   • Hypertension 2014      LOS (days): 0  Geometric Mean LOS (GMLOS) (days):   Days to GMLOS:     OBJECTIVE:            Current admission status: Outpatient Surgery   Preferred Pharmacy:   [id number] - [facility] [address] [place] [address]  Phone: [phone] Fax: [phone]    Primary Care Provider: Emma Wills MD    Primary Insurance: MEDICARE  Secondary Insurance: AARP    DISCHARGE DETAILS:    Discharge planning discussed with[de-identified] patient  Freedom of Choice: Yes  Comments - Freedom of Choice: Provided list of outpt PT facilites & informed she could also Goggle locations near her home      Were Treatment Team discharge recommendations reviewed with patient/caregiver?: Yes  Did patient/caregiver verbalize understanding of patient care needs?: Yes       Contacts  Contact Method: In Person  Reason/Outcome: Discharge [address]         Is the patient interested in [facility] at discharge?: No    DME Referral Provided  Referral made for DME?: No    Other Referral/Resources/Interventions Provided:  Interventions: Outpatient PT  Referral Comments: Provided list of outpt PT facilites & informed pt she could also Goggle locations near her home           Treatment Team Recommendation: Home  Discharge Destination Plan[de-identified] Home  Transport at Discharge : Automobile (friend Albert Abrams)

## 2023-04-22 NOTE — NURSING NOTE
Patient discharged at this time  IV removed  Discharge instructions reviewed with patient with verbal understanding  Left in stable condition

## 2023-04-22 NOTE — OCCUPATIONAL THERAPY NOTE
Occupational Therapy Evaluation     Patient Name: Calista HUNG Date: 4/22/2023  Problem List  Principal Problem:    Osteoarthritis of right shoulder  Active Problems:    Hypertension    Hypothyroidism    Stage 3a chronic kidney disease ([facility] )    Past Medical History  Past Medical History:   Diagnosis Date    Asthma     Cancer ([facility] )     breast    Disease of thyroid gland     Hyperlipidemia     Hypertension      Past Surgical History  Past Surgical History:   Procedure Laterality Date    BACK SURGERY      L6! Correct--L6  BREAST BIOPSY      reconstruction    HAND SURGERY Right     Thumb/Wrist Prosthesis    LUMBAR LAMINECTOMY      MASTECTOMY      NECK SURGERY      C4,5,6           04/22/23 0946   OT Last Visit   OT Visit Date 04/22/23   Note Type   Note type Evaluation   Pain Assessment   Pain Assessment Tool 0-10   Pain Score 1   Pain Location/Orientation Orientation: Right;Location: Shoulder   Hospital Pain Intervention(s) Repositioned; Ambulation/increased activity; Elevated; Rest   Multiple Pain Sites No   Restrictions/Precautions   Weight Bearing Precautions Per Order Yes   RUE Weight Bearing Per Order NWB   Braces or Orthoses Other (Comment)  (R shldr immobilizer w/ abduction pillow)   Other Precautions Fall Risk;Pain   Home Living   Type of Home Apartment   Home Layout One level;Elevator   Bathroom Shower/Tub Tub/shower unit   Bathroom Toilet Standard   Bathroom Equipment   (Denies DME)   [address]   (Denies DME)   Additional Comments Pt lives alone in a one level apt with 0 HENRI and elevator access  Pt has local dtr and friends who can assist as needed at D/C  Prior Function   Level of [place] Independent with ADLs; Independent with functional mobility; Independent with IADLS   Lives With Alone   Receives Help From Family;Friend(s)   IADLs Independent with driving; Independent with meal prep; Independent with medication management   Falls in the last 6 months 1 to 4  (1)   Vocational Full time employment   Comments At baseline, pt was I w/ ADLs, IADLs, and functional transfers/mobility w/o use of AD  (+)   FT employed  +Fall PTA  Lifestyle   Autonomy At baseline, pt was I w/ ADLs, IADLs, and functional transfers/mobility w/o use of AD  (+)   FT employed  +Fall PTA  Reciprocal Relationships Dtr, supportive friends   Service to Others FT employed- Works at a [employer] firm,    ADL   Where Assessed Chair   Eating Assistance 7  [facility] 5  [address] [facility] 4  Minimal Assistance   LB [address]  [address]  4  Minimal Assistance    [facility] 5  [address]  5  [id number] [facility] 5  Supervision/Setup   Bed Mobility   Supine to Sit Unable to assess   Sit to Supine Unable to assess   Additional Comments Pt seated OOB in chair at start/end of session  Call bell and phone within reach  All needs met and pt reports no further questions for OT at this time  Transfers   Sit to Stand 6  Modified independent   Additional items Armrests; Increased time required   Stand to Sit 6  Modified independent   Additional items Armrests; Increased time required   Functional Mobility   Functional Mobility 5  Supervision   Additional Comments Assist x1 w/o use of AD   Balance   Static Sitting Normal   Dynamic Sitting Good   Static Standing Fair +   Dynamic Standing Fair   Ambulatory Fair   Activity Tolerance   Activity Tolerance Patient limited by pain; Patient tolerated treatment well   Medical Staff Sultana Hanna RN   Nurse Made Aware yes   RUE Assessment   RUE Assessment X  (sling donned;  strength: 4+/5)   LUE Assessment   LUE Assessment WFL   LUE Strength   LUE Overall Strength Within Functional Limits - able to perform ADL tasks with strength  (4+/5 throughout)   Hand Function   Gross Motor Coordination Functional   Fine Motor Coordination Functional   Sensation   Light Touch Partial deficits in the RUE   Proprioception   Proprioception No apparent deficits   Vision-Basic Assessment   Current Vision Wears glasses only for reading   Vision - Complex Assessment   Ocular Range of Motion Intact   Acuity Able to read clock/calendar on wall without difficulty; Able to read employee name badge without difficulty   Psychosocial   Psychosocial (WDL) WDL   Perception   Inattention/Neglect Appears intact   Cognition   Overall Cognitive Status WFL   Arousal/Participation Alert; Cooperative   Attention Within functional limits   Orientation Level Oriented X4   Memory Within functional limits   Following Commands Follows all commands and directions without difficulty   Assessment   Prognosis Good   Assessment Pt is a 70 y o  female seen for OT evaluation s/p adm to [address] on 4/21/2023 s/p R reverse total shoulder replacement  R sling donned during eval  NWB R UE  Comorbidities affecting pt’s functional performance include a significant PMH of Asthma, CA, HLD, HTN  Pt with active OT orders  Pt lives alone in a one level apt with 0 HENRI and elevator access  Pt has local dtr and friends who can assist as needed at D/C  At baseline, pt was I w/ ADLs, IADLs, and functional transfers/mobility w/o use of AD  (+)   FT employed  +Fall PTA  Upon evaluation, pt currently requires Min A for UB ADLs, Supervision for LB ADLs, Supervision for toileting, Mod I for transfers, and Supervision for functional mobility 2* the following deficits impacting occupational performance: decreased balance, decreased tolerance, impaired sensation and increased pain  Pt w/ personal factors of:fall risk, WBS, limited home support, difficulty performing ADLS and difficulty performing IADLS   Despite above mentioned deficits and personal factors, pt w/ limited ADL deficits   Based on the aforementioned OT evaluation, functional performance deficits, and assessments, pt has been identified as a Moderate complexity evaluation  No further acute OT needs identified at this time  Recommend continued mobilization with hospital staff while in the hospital to increase pt’s endurance and strength upon D/C  From OT standpoint, recommend D/C w/ OPPT when medically cleared  D/C pt from OT caseload at this time  Goals   Patient Goals To get back to work   Plan   OT Frequency Eval only  (D/C OT)   Recommendation   OT Discharge Recommendation Home with outpatient rehabilitation  (OPPT)   Additional Comments  The patient's raw score on the AM-PAC Daily Activity Inpatient Short Form is 19  A raw score of greater than or equal to 19 suggests the patient may benefit from discharge to home  Please refer to the recommendation of the Occupational Therapist for safe discharge planning     AM-PAC Daily Activity Inpatient   Lower Body Dressing 3   Bathing 3   Toileting 3   Upper Body Dressing 3   Grooming 3   Eating 4   Daily Activity Raw Score 19   Daily Activity Standardized Score (Calc for Raw Score >=11) 40 22   AM-PAC Applied Cognition Inpatient   Following a Speech/Presentation 4   Understanding Ordinary Conversation 4   Taking Medications 4   Remembering Where Things Are Placed or Put Away 4   Remembering List of 4-5 Errands 4   Taking Care of Complicated Tasks 4   Applied Cognition Raw Score 24   Applied Cognition Standardized Score 62 21       George Samson OTR/L

## 2023-04-22 NOTE — PROGRESS NOTES
POD 1 RT SHOULDER DR CUMMINGS  DOING WELL, SEEN OOB IN CHAIR  SLING ON, DRESSING CLEAN AND DRY  WRIST AND FINGER ROM INTACT  OK FOR DC HOME TODAY AFTER THERAPY SEES AND CLEARS

## 2023-04-22 NOTE — PROGRESS NOTES
[address] [place] [address]  Progress Note  Name: Andrea Lynch  MRN: [id number]  Unit/Bed#: E2 -01 I Date of Admission: 2023   Date of Service: 2023 I Hospital Day: 0    Assessment/Plan   * Osteoarthritis of right shoulder  Assessment & Plan  61-year-old female admitted under the orthopedic service for the management of her right shoulder osteoarthritis  She is status post reverse total shoulder replacement right  · Dispo, DVT prophylaxis, and rest of management per primary team    Stage 3a chronic kidney disease [facility])  Assessment & Plan  Lab Results   Component Value Date    EGFR 63 2023    EGFR 68 02/10/2023    EGFR 55 2022    CREATININE 0 89 2023    CREATININE 0 84 02/10/2023    CREATININE 0 99 2022     Stable  Does not meet OPAL criteria    Mild persistent asthma without complication  Assessment & Plan  Continue prn albuterol and trelegy    Hypothyroidism  Assessment & Plan  Continue thyroid medication    Hypertension  Assessment & Plan  Controlled  · Continue hydrochlorothiazide and losartan           VTE Pharmacologic Prophylaxis:   Pharmacologic: per primary team    Education and Discussions with Family / Patient: patient    Current Length of Stay: 0 day(s)    Current Patient Status: Outpatient Surgery   Certification Statement: The patient will continue to require additional inpatient hospital stay due to dispo planning    Discharge Plan: today    Code Status: No Order      Subjective:   Patient seen and examined at bedside  She denies having any shortness of breath at this point  She is feeling much better and is looking forward to her discharge today  Objective:     Vitals:   Temp (24hrs), Av 1 °F (36 2 °C), Min:96 °F (35 6 °C), Max:97 8 °F (36 6 °C)    Temp:  [96 °F (35 6 °C)-97 8 °F (36 6 °C)] 97 7 °F (36 5 °C)  HR:  [62-94] 93  Resp:  [12-20] 18  BP: (116-163)/(72-91) 128/89  SpO2:  [91 %-100 %] 94 %  Body mass index is 24 79 kg/m²  Input and Output Summary (last 24 hours): Intake/Output Summary (Last 24 hours) at 4/22/2023 1013  Last data filed at 4/22/2023 0301  Gross per 24 hour   Intake 1000 ml   Output 2525 ml   Net -1525 ml       Physical Exam:     Physical Exam  Vitals reviewed  Constitutional:       General: She is not in acute distress  HENT:      Head: Normocephalic  Nose: Nose normal       Mouth/Throat:      Mouth: Mucous membranes are moist    Eyes:      General: No scleral icterus  Cardiovascular:      Rate and Rhythm: Normal rate  Pulmonary:      Effort: Pulmonary effort is normal  No respiratory distress  Breath sounds: No wheezing or rales  Abdominal:      General: There is no distension  Palpations: Abdomen is soft  Tenderness: There is no abdominal tenderness  Musculoskeletal:      Comments: Right arm sling   Skin:     General: Skin is warm  Neurological:      Mental Status: She is alert and oriented to person, place, and time  Psychiatric:         Mood and Affect: Mood normal          Behavior: Behavior normal        Additional Data:     Labs:                                  * I Have Reviewed All Lab Data Listed Above  * Additional Pertinent Lab Tests Reviewed:  [address] Admission Reviewed      Lines:   Invasive Devices     Peripheral Intravenous Line  Duration           Peripheral IV 04/21/23 Left Forearm 1 day                   Imaging:    Imaging Reports Reviewed Today Include: no new imaging    Recent Cultures (last 7 days):           Last 24 Hours Medication List:   Current Facility-Administered Medications   Medication Dose Route Frequency Provider Last Rate   • acetaminophen  650 mg Oral Q6H PRN Makayla Patel MD     • albuterol  2 puff Inhalation Q4H PRN Monet Osei MD     • aspirin  325 mg Oral Daily Makayla Patel MD     • Bempedoic Acid  1 tablet Oral ANTONIO Patel MD     • bisacodyl  10 mg Rectal Daily PRN Makayla Patel MD     • calcium carbonate  1,000 mg Oral Daily PRN Conception Free, MD     • docusate sodium  100 mg Oral BID Conception Free, MD     • fluticasone-umeclidinium-vilanterol   Inhalation Daily Gilberto Coats MD     • hydrochlorothiazide  12 5 mg Oral Daily Conception Free, MD     • HYDROcodone-acetaminophen  1 tablet Oral Q4H PRN Conception Free, MD     • HYDROmorphone  0 5 mg Intravenous Q2H PRN Conception Free, MD     • levothyroxine  150 mcg Oral Early Morning Conception Free, MD     • loratadine  10 mg Oral Daily Conception Free, MD     • losartan  50 mg Oral Daily Conception Free, MD     • methocarbamol  500 mg Oral Q6H [address] Conception Free, MD     • montelukast  10 mg Oral QPM Conception Free, MD     • ondansetron  4 mg Intravenous Q6H PRN Conception Free, MD     • sodium chloride  100 mL/hr Intravenous Continuous Conception Free,  mL/hr (04/21/23 1512)        Today, Patient Was Seen By: Vasu Coleman MD    ** Please Note: Dictation voice to text software may have been used in the creation of this document   **

## 2023-04-26 ENCOUNTER — EVALUATION (OUTPATIENT)
Dept: PHYSICAL THERAPY | Facility: CLINIC | Age: 78
End: 2023-04-26

## 2023-04-26 DIAGNOSIS — Z96.611 S/P REVERSE TOTAL SHOULDER ARTHROPLASTY, RIGHT: Primary | ICD-10-CM

## 2023-04-26 NOTE — PROGRESS NOTES
PT Evaluation     Today's date: 2023  Patient name: Shanelle Knutson  : 1945  MRN: [id number]  Referring provider: Pedro Lewis MD  Dx:   Encounter Diagnosis     ICD-10-CM    1  S/P reverse total shoulder arthroplasty, right  Z96 611                      Assessment  Assessment details: Pt is a 71 y/o female who presents to physical therapy s/p R TSA on 23 with Dr Romana Hernandez in the environment of previous lower cervical fusion  Pt does not present with any red flag symptoms at this time  Pt doing well at this time following surgery  She has been completing PROM at home with the help of her grandson  She has been compliant with HEP  Due to inability to get out of sling, did notice some stiffness in the elbow for full extension  Educated pt on how to work on this at home and added this to [employer]  Discussed the importance of getting out of the sling for this purpose  Education provided regarding POC, prognosis and HEP, pt verablized understanding  Pt would benefit from skilled physical therapy in order to decrease deficits and return to prior level of function  Impairments: abnormal or restricted ROM, activity intolerance, impaired physical strength and pain with function  Understanding of Dx/Px/POC: good  Goals  STG (4 weeks):  Pt will be independent with HEP  Pt will demonstrate flexion ROM to 105d  Pt will demonstrate ER to 30d  LTG (8 weeks): FOTO will be expected outcome  Pt will demonstrate painfree flexion ROM to 120d  Pt will demonstrate AROM scaption to 100d  Plan  Patient would benefit from: skilled physical therapy  Planned modality interventions: cryotherapy  Planned therapy interventions: manual therapy, neuromuscular re-education, patient education, self care, strengthening, stretching, therapeutic activities, therapeutic exercise and home exercise program  Frequency: 1-2x/week    Duration in weeks: 10  Treatment plan discussed with: patient        Subjective Evaluation    History of Present Illness  Mechanism of injury: Chief Complaint: Pt reports R TSA on 4/21/23  Pt reports that prior to surgery she was having a lot of pain and crepitus  She reports since the surgery, she has had some pain but is able to mitigate it via pain medication  She reports 0 pain today in the shoulder  She has had some pain in the medial elbow  States that she is having difficulty removing the sling by herself, and therefore, has it on all day until someone comes to her house to help her at night  Severity: post op  Irritability: post op  Nature: nociceptive  Stage: acute  Stability: progressing    P1: post op    Physical Activity: wants to return to golf  Sleep: Recliner    Occupation: desk job  GHS: Bowel reduction in movements since surgery due to pain meds, has been able to go with the help of medication for this  Patient Goals  Patient goals for therapy: return to work  Patient goal: return to golfing        Objective     Postural Observations    Additional Postural Observation Details  Sling pad off of the neck, height in a good place    Cervical/Thoracic Screen   Cervical range of motion within normal limits with the following exceptions:  Will assess in the upcoming visits, does have a history of ACDF C5-6    Active Range of Motion     Additional Active Range of Motion Details  Not tested due to post op status    Passive Range of Motion     Right Shoulder   Flexion: 80 degrees with pain  External rotation 0°: 0 degrees with pain    Additional Passive Range of Motion Details  Empty end feel    Strength/Myotome Testing     Additional Strength Details  Not tested due to post op status             Precautions: Cervical fusion C5-6    POC expires Auth Status Unit limit Start date  Expiration date PT/OT + Visit Limit?   7/5/23 None required [facility] 4/26/23 12/31/23 BOMN                                           Manuals 4/26 Neuro Re-Ed                                                                                                        Ther Ex             PROM-shld/elbow JOSE MIGUEL            AAROM elbow flexion HEP            Pendulums HEP                                                                Pt edu JOSE MIGUEL            Ther Activity                                       Gait Training                                       Modalities

## 2023-04-26 NOTE — LETTER
2023    Conception Free, MD  [address] [facility]    Patient: Lizzy Jack   YOB: 1945   Date of Visit: 2023     Encounter Diagnosis     ICD-10-CM    1  S/P reverse total shoulder arthroplasty, right  Z96 611           Dear Dr Rosy Tidwell:    Thank you for your recent referral of Lizzy Jack  Please review the attached evaluation summary from Hope's recent visit  Please verify that you agree with the plan of care by signing the attached order  If you have any questions or concerns, please do not hesitate to call  I sincerely appreciate the opportunity to share in the care of one of your patients and hope to have another opportunity to work with you in the near future  Sincerely,    Lucila Kyle, PT      Referring Provider:      I certify that I have read the below Plan of Care and certify the need for these services furnished under this plan of treatment while under my care  Conception Free, MD  [address] [place] [address]  Via Fax: [phone]          PT Evaluation     Today's date: 2023  Patient name: Lizzy Jack  : 1945  MRN: [id number]  Referring provider: Ishan Nicole MD  Dx:   Encounter Diagnosis     ICD-10-CM    1  S/P reverse total shoulder arthroplasty, right  Z96 611                      Assessment  Assessment details: Pt is a 69 y/o female who presents to physical therapy s/p R TSA on 23 with Dr Rosy Tidwell in the environment of previous lower cervical fusion  Pt does not present with any red flag symptoms at this time  Pt doing well at this time following surgery  She has been completing PROM at home with the help of her grandson  She has been compliant with HEP  Due to inability to get out of sling, did notice some stiffness in the elbow for full extension  Educated pt on how to work on this at home and added this to [employer]   Discussed the importance of getting out of the sling for this purpose  Education provided regarding POC, prognosis and HEP, pt verablized understanding  Pt would benefit from skilled physical therapy in order to decrease deficits and return to prior level of function  Impairments: abnormal or restricted ROM, activity intolerance, impaired physical strength and pain with function  Understanding of Dx/Px/POC: good  Goals  STG (4 weeks):  Pt will be independent with HEP  Pt will demonstrate flexion ROM to 105d  Pt will demonstrate ER to 30d  LTG (8 weeks): FOTO will be expected outcome  Pt will demonstrate painfree flexion ROM to 120d  Pt will demonstrate AROM scaption to 100d  Plan  Patient would benefit from: skilled physical therapy  Planned modality interventions: cryotherapy  Planned therapy interventions: manual therapy, neuromuscular re-education, patient education, self care, strengthening, stretching, therapeutic activities, therapeutic exercise and home exercise program  Frequency: 1-2x/week  Duration in weeks: 10  Treatment plan discussed with: patient        Subjective Evaluation    History of Present Illness  Mechanism of injury: Chief Complaint: Pt reports R TSA on 4/21/23  Pt reports that prior to surgery she was having a lot of pain and crepitus  She reports since the surgery, she has had some pain but is able to mitigate it via pain medication  She reports 0 pain today in the shoulder  She has had some pain in the medial elbow  States that she is having difficulty removing the sling by herself, and therefore, has it on all day until someone comes to her house to help her at night  Severity: post op  Irritability: post op  Nature: nociceptive  Stage: acute  Stability: progressing    P1: post op    Physical Activity: wants to return to golf  Sleep: Recliner    Occupation: desk job  GHS: Bowel reduction in movements since surgery due to pain meds, has been able to go with the help of medication for this     Patient Goals  Patient goals for therapy: return to work  Patient goal: return to golfing        Objective     Postural Observations    Additional Postural Observation Details  Sling pad off of the neck, height in a good place    Cervical/Thoracic Screen   Cervical range of motion within normal limits with the following exceptions:  Will assess in the upcoming visits, does have a history of ACDF C5-6    Active Range of Motion     Additional Active Range of Motion Details  Not tested due to post op status    Passive Range of Motion     Right Shoulder   Flexion: 80 degrees with pain  External rotation 0°: 0 degrees with pain    Additional Passive Range of Motion Details  Empty end feel    Strength/Myotome Testing     Additional Strength Details  Not tested due to post op status            Precautions: Cervical fusion C5-6    POC expires Auth Status Unit limit Start date  Expiration date PT/OT + Visit Limit?   7/5/23 None required [facility] 4/26/23 12/31/23 BOMN                                           Manuals 4/26                                                                Neuro Re-Ed                                                                                                        Ther Ex             PROM-shld/elbow JOSE MIGUEL            AAROM elbow flexion HEP            Pendulums HEP                                                                Pt edu JOSE MIGUEL            Ther Activity                                       Gait Training                                       Modalities

## 2023-05-03 ENCOUNTER — OFFICE VISIT (OUTPATIENT)
Dept: PHYSICAL THERAPY | Facility: CLINIC | Age: 78
End: 2023-05-03

## 2023-05-03 DIAGNOSIS — Z96.611 S/P REVERSE TOTAL SHOULDER ARTHROPLASTY, RIGHT: Primary | ICD-10-CM

## 2023-05-03 NOTE — PROGRESS NOTES
"Daily Note     Today's date: 5/3/2023  Patient name: Dorian Koehler  : 1945  MRN: [id number]  Referring provider: Safia Mendoza MD  Dx:   Encounter Diagnosis     ICD-10-CM    1  S/P reverse total shoulder arthroplasty, right  Z96 611                      Subjective: Pt reports she is completing HEP as prescribed  Objective: See treatment diary below      Assessment: Tolerated treatment fair  Pt had difficulty with supine head elevated position today  Did most of PROM in standing  PROM flex: ~70d  PROM ER 0d ~25d  Patient would benefit from continued PT      Plan: Continue per plan of care  Progress treatment as tolerated         Precautions: Cervical fusion C5-6    POC expires Auth Status Unit limit Start date  Expiration date PT/OT + Visit Limit?   23 None required BOMN 23 BOMN                                           Manuals 4/26 5/3                                                               Neuro Re-Ed                                                                                                        Ther Ex             PROM-shld/elbow JOSE MIGUEL JOSE MIGUEL           AAROM elbow flexion HEP 15x           Pendulums HEP            Cervical R ROT with pt OP  15x5\"                                                  Pt edu JOSE MIGUEL            Ther Activity                                       Gait Training                                       Modalities                                            "

## 2023-05-05 ENCOUNTER — OFFICE VISIT (OUTPATIENT)
Dept: PHYSICAL THERAPY | Facility: CLINIC | Age: 78
End: 2023-05-05

## 2023-05-05 DIAGNOSIS — Z96.611 S/P REVERSE TOTAL SHOULDER ARTHROPLASTY, RIGHT: Primary | ICD-10-CM

## 2023-05-05 NOTE — PROGRESS NOTES
"Daily Note     Today's date: 2023  Patient name: Elvie Ng  : 1945  MRN: [id number]  Referring provider: Tanja Cheung MD  Dx:   Encounter Diagnosis     ICD-10-CM    1  S/P reverse total shoulder arthroplasty, right  Z96 611                      Subjective: Pt offers no new complaints  Objective: See treatment diary below      Assessment: Tolerated treatment well  PROM more well tolerated  Able to achieve about 90d scaption  ER still painful, did not press into this pain due to acuity  Initiated AROM elbow flexion, pt tolerated well without reports of pain  Cueing on increasing trunk flexion for pendulums in order to increase ROM, pt tolerated well  Patient would benefit from continued PT      Plan: Continue per plan of care  Progress treatment as tolerated         Precautions: Cervical fusion C5-6    POC expires Auth Status Unit limit Start date  Expiration date PT/OT + Visit Limit?   23 None required BOMN 23 BOMN                                           Manuals 4/26 5/3 5/5                                                              Neuro Re-Ed                                                                                                        Ther Ex             PROM-shld/elbow JOSE MGIUEL JOSE MIGUEL JOSE MIGUEL          AAROM elbow flexion HEP 15x AROM 20x          Pendulums HEP  20x ea CW/CCW/A/p/M/L          Cervical R ROT with pt OP  15x5\"                                                  Pt edu JOSE MIGUEL            Ther Activity                                       Gait Training                                       Modalities                                            "

## 2023-05-10 ENCOUNTER — OFFICE VISIT (OUTPATIENT)
Dept: PHYSICAL THERAPY | Facility: CLINIC | Age: 78
End: 2023-05-10

## 2023-05-10 DIAGNOSIS — Z96.611 S/P REVERSE TOTAL SHOULDER ARTHROPLASTY, RIGHT: Primary | ICD-10-CM

## 2023-05-10 NOTE — PROGRESS NOTES
"Daily Note     Today's date: 5/10/2023  Patient name: Yaw Nicholas  : 1945  MRN: [id number]  Referring provider: Nataliya Cody MD  Dx:   Encounter Diagnosis     ICD-10-CM    1  S/P reverse total shoulder arthroplasty, right  Z96 611                      Subjective: Pt reports her shoulder is doing fine, however, she is having a lot of pain in her post scap and the R lateral neck  Objective: See treatment diary below      Assessment: Tolerated treatment well  PROM flexion continues to improve and pt is able to tolerate it more  ER is still challenging for the patient to tolerate, pain prior to 0d  Unable to go any further than this  Some increased post upper arm pain with AROM elbow flexion today  Patient would benefit from continued PT  Plan: Continue per plan of care  Progress treatment as tolerated         Precautions: Cervical fusion C5-6    POC expires Auth Status Unit limit Start date  Expiration date PT/OT + Visit Limit?   23 None required BOMN 23 BOMN                                           Manuals 4/26 5/3 5/5 5/10                                                             Neuro Re-Ed                                                                                                        Ther Ex             PROM-shld/elbow JOSE MIGUEL JOSE MIGUEL JOSE MIGUEL JOSE MIGUEL         AAROM elbow flexion HEP 15x AROM 20x AROM 20x         Pendulums HEP  20x ea CW/CCW/A/p/M/L 20x ea CW/CCW/A/p/M/L          Cervical R ROT with pt OP  15x5\"                                                  Pt edu JOSE MIGUEL            Ther Activity                                       Gait Training                                       Modalities                                            "

## 2023-05-12 ENCOUNTER — OFFICE VISIT (OUTPATIENT)
Dept: PHYSICAL THERAPY | Facility: CLINIC | Age: 78
End: 2023-05-12

## 2023-05-12 DIAGNOSIS — Z96.611 S/P REVERSE TOTAL SHOULDER ARTHROPLASTY, RIGHT: Primary | ICD-10-CM

## 2023-05-12 NOTE — PROGRESS NOTES
"Daily Note     Today's date: 2023  Patient name: Graham Ugarte  : 1945  MRN: [id number]  Referring provider: Alfredo Epstein MD  Dx:   Encounter Diagnosis     ICD-10-CM    1  S/P reverse total shoulder arthroplasty, right  Z96 611                      Subjective: Pt reports the R side of her ribs is bothering her  Objective: See treatment diary below      Assessment: Tolerated treatment fair  PROM improving, however, she continues to have difficulty with ER  Able to get to ~95-100d of shld flexion in scaption today  Patient would benefit from continued PT      Plan: Continue per plan of care  Progress treatment as tolerated         Precautions: Cervical fusion C5-6    POC expires Auth Status Unit limit Start date  Expiration date PT/OT + Visit Limit?   23 None required BOMN 23 BOMN                                           Manuals 4/26 5/3 5/5 5/10                                                             Neuro Re-Ed                                                                                                        Ther Ex             PROM-shld/elbow JOSE MIGUEL JOSE MIGUEL JOSE MIGUEL JOSE MIGUEL JOSE MIGUEL        AAROM elbow flexion HEP 15x AROM 20x AROM 20x AROM 20x        Pendulums HEP  20x ea CW/CCW/A/p/M/L 20x ea CW/CCW/A/p/M/L  20x ea CW/CCW/A/P/M/L        Cervical R ROT with pt OP  15x5\"                                                  Pt edu JOSE MIGUEL            Ther Activity                                       Gait Training                                       Modalities                                            "

## 2023-05-15 ENCOUNTER — OFFICE VISIT (OUTPATIENT)
Dept: PHYSICAL THERAPY | Facility: CLINIC | Age: 78
End: 2023-05-15

## 2023-05-15 DIAGNOSIS — Z96.611 S/P REVERSE TOTAL SHOULDER ARTHROPLASTY, RIGHT: Primary | ICD-10-CM

## 2023-05-15 NOTE — PROGRESS NOTES
"Daily Note     Today's date: 5/15/2023  Patient name: Melody Wang  : 1945  MRN: [id number]  Referring provider: Shahid Messer MD  Dx:   Encounter Diagnosis     ICD-10-CM    1  S/P reverse total shoulder arthroplasty, right  Z96 611                      Subjective: Pt reports her shoulder is a little more sore today, 5/10  Objective: See treatment diary below      Assessment: Therapist late to treatment  Able to complete all listed exercise but because of therapist reduced amount of time for treatment today  Tolerated treatment well  PROM continues to improve with less pain into flexion and abduction  Patient would benefit from continued PT      Plan: Continue per plan of care  Progress treatment as tolerated         Precautions: Cervical fusion C5-6    POC expires Auth Status Unit limit Start date  Expiration date PT/OT + Visit Limit?   23 None required BOMN 23 BOMN                                           Manuals 4/26 5/3 5/5 5/10  5/15                                                           Neuro Re-Ed                                                                                                        Ther Ex             PROM-shld/elbow JOSE MIGUEL JOSE MIGUEL JOSE MIGUEL JOSE MIGUEL JOSE MIGUEL JOSE MIGUEL       AAROM elbow flexion HEP 15x AROM 20x AROM 20x AROM 20x AROM 20x       Pendulums HEP  20x ea CW/CCW/A/p/M/L 20x ea CW/CCW/A/p/M/L  20x ea CW/CCW/A/P/M/L 20x ea CW/CCW/A/P/M/L       Cervical R ROT with pt OP  15x5\"                                                  Pt edu JOSE MIGUEL            Ther Activity                                       Gait Training                                       Modalities                                            "

## 2023-05-17 ENCOUNTER — OFFICE VISIT (OUTPATIENT)
Dept: PHYSICAL THERAPY | Facility: CLINIC | Age: 78
End: 2023-05-17

## 2023-05-17 DIAGNOSIS — Z96.611 S/P REVERSE TOTAL SHOULDER ARTHROPLASTY, RIGHT: Primary | ICD-10-CM

## 2023-05-17 NOTE — PROGRESS NOTES
"Daily Note     Today's date: 2023  Patient name: Nica Ortiz  : 1945  MRN: [id number]  Referring provider: Cindy Mccurdy MD  Dx:   Encounter Diagnosis     ICD-10-CM    1  S/P reverse total shoulder arthroplasty, right  Z96 611                      Subjective: Pt reports she was able to sleep better last night compared to previously  Objective: See treatment diary below      Assessment: Tolerated treatment well  ROM continues to be less painful  More ROM achieved with ER today  Patient would benefit from continued PT      Plan: Continue per plan of care  Progress treatment as tolerated         Precautions: Cervical fusion C5-6    POC expires Auth Status Unit limit Start date  Expiration date PT/OT + Visit Limit?   23 None required BOMN 23 BOMN                                           Manuals 4/26 5/3 5/5 5/10  5/15 5/17                                                          Neuro Re-Ed                                                                                                        Ther Ex             PROM-shld/elbow JOSE MIGUEL JOSE MIGUEL JOSE MIGUEL JOSE MIGUEL JOSE MIGUEL JOSE MIGUEL JOSE MIGUEL      AAROM elbow flexion HEP 15x AROM 20x AROM 20x AROM 20x AROM 20x AROM 20x      Pendulums HEP  20x ea CW/CCW/A/p/M/L 20x ea CW/CCW/A/p/M/L  20x ea CW/CCW/A/P/M/L 20x ea CW/CCW/A/P/M/L 20x ea CW/CCW/A/P/M/L      Cervical R ROT with pt OP  15x5\"                                                  Pt edu JOSE MIGUEL            Ther Activity                                       Gait Training                                       Modalities                                            "

## 2023-05-19 DIAGNOSIS — M10.9 ACUTE GOUT, UNSPECIFIED CAUSE, UNSPECIFIED SITE: Primary | ICD-10-CM

## 2023-05-19 RX ORDER — INDOMETHACIN 50 MG/1
50 CAPSULE ORAL 2 TIMES DAILY WITH MEALS
Qty: 30 CAPSULE | Refills: 0 | Status: SHIPPED | OUTPATIENT
Start: 2023-05-19

## 2023-05-22 ENCOUNTER — EVALUATION (OUTPATIENT)
Dept: PHYSICAL THERAPY | Facility: CLINIC | Age: 78
End: 2023-05-22

## 2023-05-22 DIAGNOSIS — Z96.611 S/P REVERSE TOTAL SHOULDER ARTHROPLASTY, RIGHT: Primary | ICD-10-CM

## 2023-05-22 NOTE — LETTER
May 22, 2023    MD Cecille Chapman [address]     Patient: Adri Magaña   YOB: 1945   Date of Visit: 2023     Encounter Diagnosis     ICD-10-CM    1  S/P reverse total shoulder arthroplasty, right  Z96 611           Dear Dr Suad Nava:    Thank you for your recent referral of Adri Magaña  Please review the attached evaluation summary from Hope's recent visit  Please verify that you agree with the plan of care by signing the attached order  If you have any questions or concerns, please do not hesitate to call  I sincerely appreciate the opportunity to share in the care of one of your patients and hope to have another opportunity to work with you in the near future  Sincerely,    Francy Cm, PT      Referring Provider:      I certify that I have read the below Plan of Care and certify the need for these services furnished under this plan of treatment while under my care  MD Cecille Chapman [address]   Via Fax: [phone]          PT Re-evaluation     Today's date: 2023  Patient name: Adri Magaña  : 1945  MRN: [id number]  Referring provider: Beckie Onofre MD  Dx:   Encounter Diagnosis     ICD-10-CM    1  S/P reverse total shoulder arthroplasty, right  Z96 611                      Assessment  Assessment details: Pt is a 69 y/o female who presents to physical therapy s/p R TSA on 23 with Dr Suad Nava in the environment of previous lower cervical fusion  Pt does not present with any red flag symptoms at this time  Pt doing well  Following protocol in clinic  Reminded pt of sling use today in office, pt verbalized understanding  Based on protocol and typical healing patterns, still  Pt would benefit from skilled physical therapy in order to decrease deficits and return to prior level of function      Impairments: abnormal or restricted ROM, activity intolerance, impaired physical strength and pain with function  Understanding of Dx/Px/POC: good  Goals  STG (4 weeks):   Pt will be independent with HEP  - MET  Pt will demonstrate flexion ROM to 105d  - MET  Pt will demonstrate ER to 30d  - Ongoing    LTG (8 weeks): FOTO will be expected outcome  - Ongoing  Pt will demonstrate painfree flexion ROM to 120d  - Ongoing  Pt will demonstrate AROM scaption to 100d  - Ongoing        Plan  Patient would benefit from: skilled physical therapy  Planned modality interventions: cryotherapy  Planned therapy interventions: manual therapy, neuromuscular re-education, patient education, self care, strengthening, stretching, therapeutic activities, therapeutic exercise and home exercise program  Frequency: 1-2x/week  Duration in weeks: 10  Treatment plan discussed with: patient        Subjective Evaluation    History of Present Illness  Mechanism of injury: Chief Complaint: Pt reports R TSA on 4/21/23  Pt reports that prior to surgery she was having a lot of pain and crepitus  She reports since the surgery, she has had some pain but is able to mitigate it via pain medication  She reports 0 pain today in the shoulder  She has had some pain in the medial elbow  States that she is having difficulty removing the sling by herself, and therefore, has it on all day until someone comes to her house to help her at night  Re (5/22): Pt reports her shoulder continues to get better each day  Bruising is going down and her pain is less  She reports not wearing her sling as much throughout the day recently , and sometimes her shoulder can get sore if she is not in it as much       Severity: post op  Irritability: post op  Nature: nociceptive  Stage: acute  Stability: progressing    P1: post op    Physical Activity: wants to return to golf  Sleep: Recliner    Occupation: desk job  GHS: Bowel reduction in movements since surgery due to pain meds, has been able to go with the help of "medication for this  Patient Goals  Patient goals for therapy: return to work  Patient goal: return to golfing        Objective     Postural Observations    Additional Postural Observation Details  Sling pad off of the neck, height in a good place    Cervical/Thoracic Screen   Cervical range of motion within normal limits with the following exceptions:  Will assess in the upcoming visits, does have a history of ACDF C5-6    Active Range of Motion     Additional Active Range of Motion Details  Not tested due to post op status    Passive Range of Motion     Right Shoulder   Flexion: 110 degrees  External rotation 0° in plane of scap: 5 degrees with pain    Additional Passive Range of Motion Details  Empty end feel    Strength/Myotome Testing     Additional Strength Details  Not tested due to post op status             Precautions: Cervical fusion C5-6    POC expires Auth Status Unit limit Start date  Expiration date PT/OT + Visit Limit?   7/5/23 None required [facility] 4/26/23 12/31/23 BOMN                                         Manuals 4/26 5/3 5/5 5/10  5/15 5/17 5/22                                                         Neuro Re-Ed                                                                                                        Ther Ex             PROM-shld/elbow [place]     AAROM elbow flexion HEP 15x AROM 20x AROM 20x AROM 20x AROM 20x AROM 20x AROM 20x     Pendulums HEP  20x ea CW/CCW/A/p/M/L 20x ea CW/CCW/A/p/M/L  20x ea CW/CCW/A/P/M/L 20x ea CW/CCW/A/P/M/L 20x ea CW/CCW/A/P/M/L 20x ea CW/CCW/A/P/M/L     Cervical R ROT with pt OP  15x5\"                                                  Pt edu JOSE MIGUEL            Ther Activity                                       Gait Training                                       Modalities                                                            "

## 2023-05-22 NOTE — PROGRESS NOTES
PT Re-evaluation     Today's date: 2023  Patient name: Demian Solo  : 1945  MRN: [id number]  Referring provider: Ching Avila MD  Dx:   Encounter Diagnosis     ICD-10-CM    1  S/P reverse total shoulder arthroplasty, right  Z96 611                      Assessment  Assessment details: Pt is a 69 y/o female who presents to physical therapy s/p R TSA on 23 with Dr Angelito Carranza in the environment of previous lower cervical fusion  Pt does not present with any red flag symptoms at this time  Pt doing well  Following protocol in clinic  Reminded pt of sling use today in office, pt verbalized understanding  Based on protocol and typical healing patterns, still  Pt would benefit from skilled physical therapy in order to decrease deficits and return to prior level of function  Impairments: abnormal or restricted ROM, activity intolerance, impaired physical strength and pain with function  Understanding of Dx/Px/POC: good  Goals  STG (4 weeks):   Pt will be independent with HEP  - MET  Pt will demonstrate flexion ROM to 105d  - MET  Pt will demonstrate ER to 30d  - Ongoing    LTG (8 weeks): FOTO will be expected outcome  - Ongoing  Pt will demonstrate painfree flexion ROM to 120d  - Ongoing  Pt will demonstrate AROM scaption to 100d  - Ongoing        Plan  Patient would benefit from: skilled physical therapy  Planned modality interventions: cryotherapy  Planned therapy interventions: manual therapy, neuromuscular re-education, patient education, self care, strengthening, stretching, therapeutic activities, therapeutic exercise and home exercise program  Frequency: 1-2x/week  Duration in weeks: 10  Treatment plan discussed with: patient        Subjective Evaluation    History of Present Illness  Mechanism of injury: Chief Complaint: Pt reports R TSA on 23  Pt reports that prior to surgery she was having a lot of pain and crepitus   She reports since the surgery, she has had some pain but is able to mitigate it via pain medication  She reports 0 pain today in the shoulder  She has had some pain in the medial elbow  States that she is having difficulty removing the sling by herself, and therefore, has it on all day until someone comes to her house to help her at night  Re (5/22): Pt reports her shoulder continues to get better each day  Bruising is going down and her pain is less  She reports not wearing her sling as much throughout the day recently , and sometimes her shoulder can get sore if she is not in it as much  Severity: post op  Irritability: post op  Nature: nociceptive  Stage: acute  Stability: progressing    P1: post op    Physical Activity: wants to return to golf  Sleep: Recliner    Occupation: desk job  GHS: Bowel reduction in movements since surgery due to pain meds, has been able to go with the help of medication for this  Patient Goals  Patient goals for therapy: return to work  Patient goal: return to golfing        Objective     Postural Observations    Additional Postural Observation Details  Sling pad off of the neck, height in a good place    Cervical/Thoracic Screen   Cervical range of motion within normal limits with the following exceptions:  Will assess in the upcoming visits, does have a history of ACDF C5-6    Active Range of Motion     Additional Active Range of Motion Details  Not tested due to post op status    Passive Range of Motion     Right Shoulder   Flexion: 110 degrees  External rotation 0° in plane of scap: 5 degrees with pain    Additional Passive Range of Motion Details  Empty end feel    Strength/Myotome Testing     Additional Strength Details  Not tested due to post op status             Precautions: Cervical fusion C5-6    POC expires Auth Status Unit limit Start date  Expiration date PT/OT + Visit Limit?   7/5/23 None required [facility] 4/26/23 12/31/23 BOMN                                         Manuals 4/26 5/3 5/5 5/10  5/15 5/17 5/22 "                                         Neuro Re-Ed                                                                                                        Ther Ex             PROM-shld/elbow JOSE MIGUEL JOES MIGUEL [place] JOSE MIGUEL     AAROM elbow flexion HEP 15x AROM 20x AROM 20x AROM 20x AROM 20x AROM 20x AROM 20x     Pendulums HEP  20x ea CW/CCW/A/p/M/L 20x ea CW/CCW/A/p/M/L  20x ea CW/CCW/A/P/M/L 20x ea CW/CCW/A/P/M/L 20x ea CW/CCW/A/P/M/L 20x ea CW/CCW/A/P/M/L     Cervical R ROT with pt OP  15x5\"                                                  Pt edu JOSE MIGUEL            Ther Activity                                       Gait Training                                       Modalities                                            "

## 2023-05-24 ENCOUNTER — OFFICE VISIT (OUTPATIENT)
Dept: PHYSICAL THERAPY | Facility: CLINIC | Age: 78
End: 2023-05-24

## 2023-05-24 DIAGNOSIS — Z96.611 S/P REVERSE TOTAL SHOULDER ARTHROPLASTY, RIGHT: Primary | ICD-10-CM

## 2023-05-24 NOTE — PROGRESS NOTES
"Daily Note     Today's date: 2023  Patient name: Wilton Pavon  : 1945  MRN: [id number]  Referring provider: Kristen Mckeon MD  Dx:   Encounter Diagnosis     ICD-10-CM    1  S/P reverse total shoulder arthroplasty, right  Z96 611                      Subjective: Pt offers no new complaints  Objective: See treatment diary below      Assessment: Tolerated treatment well  Able to progress past 0d of ER in scaption today to ~5d prior to onset of pain  Patient would benefit from continued PT      Plan: Continue per plan of care  Progress treatment as tolerated         Precautions: Cervical fusion C5-6    POC expires Auth Status Unit limit Start date  Expiration date PT/OT + Visit Limit?   23 None required BOMN 23 BOMN                                         Manuals 4/26 5/3 5/5 5/10  5/15 5/17 5/22 5/24                                                        Neuro Re-Ed                                                                                                        Ther Ex             PROM-shld/elbow JOSE MIGUEL JOSE MIGUEL JOSE MIGUEL JOSE MIGUEL JOSE MIGUEL JOSE MIGUEL JOSE MIGUEL JOSE MIGUEL JOSE MIGUEL    AAROM elbow flexion HEP 15x AROM 20x AROM 20x AROM 20x AROM 20x AROM 20x AROM 20x AROM 20x    Pendulums HEP  20x ea CW/CCW/A/p/M/L 20x ea CW/CCW/A/p/M/L  20x ea CW/CCW/A/P/M/L 20x ea CW/CCW/A/P/M/L 20x ea CW/CCW/A/P/M/L 20x ea CW/CCW/A/P/M/L 20x ea CW/CCW/A/P/M/L    Cervical R ROT with pt OP  15x5\"            squeeze         Black 20x                              Pt edu JOSE MIGUEL            Ther Activity                                       Gait Training                                       Modalities                                              "

## 2023-05-31 ENCOUNTER — OFFICE VISIT (OUTPATIENT)
Dept: PHYSICAL THERAPY | Facility: CLINIC | Age: 78
End: 2023-05-31

## 2023-05-31 DIAGNOSIS — Z96.611 S/P REVERSE TOTAL SHOULDER ARTHROPLASTY, RIGHT: Primary | ICD-10-CM

## 2023-05-31 NOTE — PROGRESS NOTES
"Daily Note     Today's date: 2023  Patient name: Brigitte Anne  : 1945  MRN: [id number]  Referring provider: Brayan Gonzalez MD  Dx:   Encounter Diagnosis     ICD-10-CM    1  S/P reverse total shoulder arthroplasty, right  Z96 611                      Subjective: Pt reports shoulder is doing well, has had an increase in stomach pain and constipation the past two days  Objective: See treatment diary below      Assessment: Tolerated treatment well  PROM continues to improve, no symptoms with scaption  Patient would benefit from continued PT      Plan: Continue per plan of care  Progress treatment as tolerated         Precautions: Cervical fusion C5-6    POC expires Auth Status Unit limit Start date  Expiration date PT/OT + Visit Limit?   23 None required BOMN 23 BOMN                                         Manuals 4/26 5/3 5/5 5/10  5/15 5/17 5/22 5/24                                                        Neuro Re-Ed                                                                                                        Ther Ex             PROM-shld/elbow JOSE MIGUEL JOSE MIGUEL JOSE MIGUEL JOSE MIGUEL JOSE MIGUEL JOSE MIGUEL JOSE MIGUEL JOSE MIGUEL JOSE MIGUEL    AAROM elbow flexion HEP 15x AROM 20x AROM 20x AROM 20x AROM 20x AROM 20x AROM 20x AROM 20x    Pendulums HEP  20x ea CW/CCW/A/p/M/L 20x ea CW/CCW/A/p/M/L  20x ea CW/CCW/A/P/M/L 20x ea CW/CCW/A/P/M/L 20x ea CW/CCW/A/P/M/L 20x ea CW/CCW/A/P/M/L 20x ea CW/CCW/A/P/M/L    Cervical R ROT with pt OP  15x5\"            squeeze         Black 20x                              Pt edu JOSE MIGUEL            Ther Activity                                       Gait Training                                       Modalities                                                "

## 2023-06-02 ENCOUNTER — OFFICE VISIT (OUTPATIENT)
Dept: PHYSICAL THERAPY | Facility: CLINIC | Age: 78
End: 2023-06-02

## 2023-06-02 DIAGNOSIS — Z96.611 S/P REVERSE TOTAL SHOULDER ARTHROPLASTY, RIGHT: Primary | ICD-10-CM

## 2023-06-02 NOTE — PROGRESS NOTES
"Daily Note     Today's date: 2023  Patient name: Yi Rojas  : 1945  MRN: [id number]  Referring provider: Manuelito Bliss MD  Dx:   Encounter Diagnosis     ICD-10-CM    1  S/P reverse total shoulder arthroplasty, right  Z96 611                      Subjective: Pt offers no new complaints  Objective: See treatment diary below      Assessment: Tolerated treatment well  PROM scaption to ~130d today without pain  Will begin AROM at next visit  Patient would benefit from continued PT      Plan: Continue per plan of care  Progress treatment as tolerated         Precautions: Cervical fusion C5-6    POC expires Auth Status Unit limit Start date  Expiration date PT/OT + Visit Limit?   23 None required BOMN 23 BOMN                                         Manuals 4/26 5/3 5/5 5/10  5/15 5/17 5/22 5/24 6/2                                                       Neuro Re-Ed             scap retraction          10x5\"   scap elevation          10x5\"                                                                    Ther Ex             PROM-shld/elbow [address] [facility]   AAR elbow flexion HEP 15x AROM 20x AROM 20x AROM 20x AROM 20x AROM 20x AROM 20x AROM 20x AROM 20x   Pendulums HEP  20x ea CW/CCW/A/p/M/L 20x ea CW/CCW/A/p/M/L  20x ea CW/CCW/A/P/M/L 20x ea CW/CCW/A/P/M/L 20x ea CW/CCW/A/P/M/L 20x ea CW/CCW/A/P/M/L 20x ea CW/CCW/A/P/M/L 20x ea CW/CCW/A/P/M/L   Cervical R ROT with pt OP  15x5\"            squeeze         Black 20x    AROM scaption             AROM abduction             Pt edu JOSE MIGUEL            Ther Activity                                       Gait Training                                       Modalities                                                  "

## 2023-06-05 ENCOUNTER — OFFICE VISIT (OUTPATIENT)
Dept: PHYSICAL THERAPY | Facility: CLINIC | Age: 78
End: 2023-06-05
Payer: MEDICARE

## 2023-06-05 DIAGNOSIS — Z96.611 S/P REVERSE TOTAL SHOULDER ARTHROPLASTY, RIGHT: Primary | ICD-10-CM

## 2023-06-05 PROCEDURE — 97110 THERAPEUTIC EXERCISES: CPT | Performed by: PHYSICAL THERAPIST

## 2023-06-05 NOTE — PROGRESS NOTES
"Daily Note     Today's date: 2023  Patient name: Nona Green  : 1945  MRN: [id number]  Referring provider: Venkat Mcgrath MD  Dx:   Encounter Diagnosis     ICD-10-CM    1  S/P reverse total shoulder arthroplasty, right  Z96 611                      Subjective: Pt offers no new complaints  Objective: See treatment diary below      Assessment: Tolerated treatment well  Initiated next phase of therapy with strengthening, pt tolerated well  Educated on how to use pulley's at home and purpose of next phase of PT, pt verbalized understanding  Patient would benefit from continued PT      Plan: Continue per plan of care  Progress treatment as tolerated         Precautions: Cervical fusion C5-6    POC expires Auth Status Unit limit Start date  Expiration date PT/OT + Visit Limit?   23 None required [facility] 23 BOMN                                         Manuals 6/5 5/3 5/5 5/10  5/15 5/17 5/22 5/24 6/2                                                       Neuro Re-Ed             scap retraction 10x5\"         10x5\"   scap elevation 20x         10x5\"                                                                    Ther Ex             PROM-shld/elbow [address] [facility] elbow flexion 2# 2x15 15x AROM 20x AROM 20x AROM 20x AROM 20x AROM 20x AROM 20x AROM 20x AROM 20x   Pendulums   20x ea CW/CCW/A/p/M/L 20x ea CW/CCW/A/p/M/L  20x ea CW/CCW/A/P/M/L 20x ea CW/CCW/A/P/M/L 20x ea CW/CCW/A/P/M/L 20x ea CW/CCW/A/P/M/L 20x ea CW/CCW/A/P/M/L 20x ea CW/CCW/A/P/M/L   Cervical R ROT with pt OP  15x5\"            squeeze         Black 20x    Supine AROM flexion 2x10            AROM abduction             Osmin's 4'            Wall slide 2x10                         Pt edu JOSE MIGUEL            Ther Activity                                       Gait Training                                       Modalities                                                    "

## 2023-06-07 ENCOUNTER — APPOINTMENT (OUTPATIENT)
Dept: PHYSICAL THERAPY | Facility: CLINIC | Age: 78
End: 2023-06-07
Payer: MEDICARE

## 2023-06-09 ENCOUNTER — APPOINTMENT (OUTPATIENT)
Dept: PHYSICAL THERAPY | Facility: CLINIC | Age: 78
End: 2023-06-09
Payer: MEDICARE

## 2023-06-13 ENCOUNTER — OFFICE VISIT (OUTPATIENT)
Dept: PHYSICAL THERAPY | Facility: CLINIC | Age: 78
End: 2023-06-13
Payer: MEDICARE

## 2023-06-13 DIAGNOSIS — Z96.611 S/P REVERSE TOTAL SHOULDER ARTHROPLASTY, RIGHT: Primary | ICD-10-CM

## 2023-06-13 PROCEDURE — 97110 THERAPEUTIC EXERCISES: CPT | Performed by: PHYSICAL THERAPIST

## 2023-06-13 NOTE — PROGRESS NOTES
"Daily Note     Today's date: 2023  Patient name: Venus Pierre  : 1945  MRN: [id number]  Referring provider: Sunshine Blank MD  Dx:   Encounter Diagnosis     ICD-10-CM    1  S/P reverse total shoulder arthroplasty, right  Z96 611                      Subjective: Pt offers no new complaints  Objective: See treatment diary below      Assessment: Tolerated treatment well  Progressing strength as able  AAROM today without difficulty  Supine cane flex doing well  Patient would benefit from continued PT      Plan: Continue per plan of care  Progress treatment as tolerated         Precautions: Cervical fusion C5-6    POC expires Auth Status Unit limit Start date  Expiration date PT/OT + Visit Limit?   23 None required [facility] 23 BOMN                                         Manuals 6/5 6/13 5/5 5/10  5/15 5/17 5/22 5/24 6/2                                                       Neuro Re-Ed             scap retraction 10x5\"         10x5\"   scap elevation 20x         10x5\"                                                                    Ther Ex             PROM-shld/elbow [address] [facility]   AAR elbow flexion 2# 2x15 4# 1x20  3# 1x20 AROM 20x AROM 20x AROM 20x AROM 20x AROM 20x AROM 20x AROM 20x AROM 20x   Pendulums   20x ea CW/CCW/A/p/M/L 20x ea CW/CCW/A/p/M/L  20x ea CW/CCW/A/P/M/L 20x ea CW/CCW/A/P/M/L 20x ea CW/CCW/A/P/M/L 20x ea CW/CCW/A/P/M/L 20x ea CW/CCW/A/P/M/L 20x ea CW/CCW/A/P/M/L   Cervical R ROT with pt OP              squeeze         Black 20x    Supine AROM flexion 2x10 30x cane flexion           AROM abduction             Pulley's 4' 4'           Wall slide 2x10 2x20                        Pt edu JOSE MIGUEL            Ther Activity                                       Gait Training                                       Modalities                                                      "

## 2023-06-16 ENCOUNTER — OFFICE VISIT (OUTPATIENT)
Dept: PHYSICAL THERAPY | Facility: CLINIC | Age: 78
End: 2023-06-16
Payer: MEDICARE

## 2023-06-16 DIAGNOSIS — Z96.611 S/P REVERSE TOTAL SHOULDER ARTHROPLASTY, RIGHT: Primary | ICD-10-CM

## 2023-06-16 PROCEDURE — 97110 THERAPEUTIC EXERCISES: CPT | Performed by: PHYSICAL THERAPIST

## 2023-06-16 NOTE — PROGRESS NOTES
"Daily Note     Today's date: 2023  Patient name: Ronaldo Santana  : 1945  MRN: [id number]  Referring provider: Jovany Samaniego MD  Dx:   Encounter Diagnosis     ICD-10-CM    1  S/P reverse total shoulder arthroplasty, right  Z96 611                      Subjective: Pt offers no new complaints  Objective: See treatment diary below      Assessment: Tolerated treatment well  Progressing strengthening as able  Fatigue noted with shld flexion, but otherwise minimal fatigue noted  Patient would benefit from continued PT      Plan: Continue per plan of care  Progress treatment as tolerated         Precautions: Cervical fusion C5-6    POC expires Auth Status Unit limit Start date  Expiration date PT/OT + Visit Limit?   23 None required [facility] 23 BOMN                                         Manuals 6/5 6/13 6/16 5/10  5/15 5/17 5/22 5/24 6/2                                                       Neuro Re-Ed             scap retraction 10x5\"         10x5\"   scap elevation 20x         10x5\"                                                                    Ther Ex             PROM-shld/elbow [address] [facility] elbow flexion 2# 2x15 4# 1x20  3# 1x20  AROM 20x AROM 20x AROM 20x AROM 20x AROM 20x AROM 20x AROM 20x   Pendulums    20x ea CW/CCW/A/p/M/L  20x ea CW/CCW/A/P/M/L 20x ea CW/CCW/A/P/M/L 20x ea CW/CCW/A/P/M/L 20x ea CW/CCW/A/P/M/L 20x ea CW/CCW/A/P/M/L 20x ea CW/CCW/A/P/M/L   Cervical R ROT with pt OP              squeeze         Black 20x    Supine AROM flexion 2x10 30x cane flexion 30x cane          AROM flexion   2x10    1# 2x10  RPE 8/10          Pulley's 4' 4' 3'          Wall slide 2x10 2x20 2x20           Banded shld ext   1x15 GTB; 1x15 BTB          Tricep push down   2x15 12#          Pt edu JOSE MIGUEL            Ther Activity                                       Gait Training                                       Modalities                                     "

## 2023-06-19 ENCOUNTER — OFFICE VISIT (OUTPATIENT)
Dept: PHYSICAL THERAPY | Facility: CLINIC | Age: 78
End: 2023-06-19
Payer: MEDICARE

## 2023-06-19 DIAGNOSIS — Z96.611 S/P REVERSE TOTAL SHOULDER ARTHROPLASTY, RIGHT: Primary | ICD-10-CM

## 2023-06-19 PROCEDURE — 97110 THERAPEUTIC EXERCISES: CPT | Performed by: PHYSICAL THERAPIST

## 2023-06-19 NOTE — PROGRESS NOTES
"Daily Note     Today's date: 2023  Patient name: Hayde Ruano  : 1945  MRN: [id number]  Referring provider: Tristen Henry MD  Dx:   Encounter Diagnosis     ICD-10-CM    1  S/P reverse total shoulder arthroplasty, right  Z96 611                      Subjective: Pt reports golfing yesterday  She used her   She reports only slight pain in the R scapula following, no pain in the shoulder  Objective: See treatment diary below      Assessment: Tolerated treatment well  Strength doing well  Progressing well  Patient would benefit from continued PT      Plan: Continue per plan of care  Progress treatment as tolerated         Precautions: Cervical fusion C5-6    POC expires Auth Status Unit limit Start date  Expiration date PT/OT + Visit Limit?   23 None required [facility] 23 BOMN                                         Manuals 6/5 6/13 6/16 6/19  5/15 5/17 5/22 5/24 6/2                                                       Neuro Re-Ed             scap retraction 10x5\"         10x5\"   scap elevation 20x         10x5\"                                                                    Ther Ex             PROM-shld/elbow [address] JOSE MIGUEL JOSE MIGUEL JOSE MIGUEL   AAROM elbow flexion 2# 2x15 4# 1x20  3# 1x20   AROM 20x AROM 20x AROM 20x AROM 20x AROM 20x AROM 20x   Pendulums     20x ea CW/CCW/A/P/M/L 20x ea CW/CCW/A/P/M/L 20x ea CW/CCW/A/P/M/L 20x ea CW/CCW/A/P/M/L 20x ea CW/CCW/A/P/M/L 20x ea CW/CCW/A/P/M/L   Cervical R ROT with pt OP              squeeze         Black 20x    Supine AROM flexion 2x10 30x cane flexion 30x cane 2x20 Cane         AROM flexion   2x10    1# 2x10  RPE 8/10 2x10 1#         Pulley's 4' 4' 3' 4'         Wall slide 2x10 2x20 2x20  2x20         Banded shld ext   1x15 GTB; 1x15 BTB 2x15 BTB         Banded row    2x15 Black TB         Tricep push down   2x15 12#          Pt edu JOSE MIGUEL            Ther Activity                                       Gait Training                  " Modalities

## 2023-06-21 ENCOUNTER — OFFICE VISIT (OUTPATIENT)
Dept: PHYSICAL THERAPY | Facility: CLINIC | Age: 78
End: 2023-06-21
Payer: MEDICARE

## 2023-06-21 DIAGNOSIS — Z96.611 S/P REVERSE TOTAL SHOULDER ARTHROPLASTY, RIGHT: Primary | ICD-10-CM

## 2023-06-21 PROCEDURE — 97110 THERAPEUTIC EXERCISES: CPT | Performed by: PHYSICAL THERAPIST

## 2023-06-21 NOTE — PROGRESS NOTES
"Daily Note     Today's date: 2023  Patient name: Jayme Brittle  : 1945  MRN: [id number]  Referring provider: Tati Fernandez MD  Dx:   Encounter Diagnosis     ICD-10-CM    1  S/P reverse total shoulder arthroplasty, right  Z96 611                      Subjective: Pt reports that she has pain with reaching behind  Objective: See treatment diary below      Assessment: Tolerated treatment well  Able to progress exercise again today  Did assess shld ext, not just ROM, but strength into this direction is painful  Therefore, added row and shld ext for HEP  UBE for muscular endurance  Patient would benefit from continued PT      Plan: Continue per plan of care  Progress treatment as tolerated         Precautions: Cervical fusion C5-6    POC expires Auth Status Unit limit Start date  Expiration date PT/OT + Visit Limit?   23 None required [facility] BOMN                                         Manuals 6/5 6/13 6/16 6/19 6/21 5/15 5/17 5/22 5/24 6/2                                                       Neuro Re-Ed             scap retraction 10x5\"         10x5\"   scap elevation 20x         10x5\"                                                                    Ther Ex             PROM-shld/elbow [address]   AAROM elbow flexion 2# 2x15 4# 1x20  3# 1x20    AROM 20x AROM 20x AROM 20x AROM 20x AROM 20x   Pendulums      20x ea CW/CCW/A/P/M/L 20x ea CW/CCW/A/P/M/L 20x ea CW/CCW/A/P/M/L 20x ea CW/CCW/A/P/M/L 20x ea CW/CCW/A/P/M/L   Cervical R ROT with pt OP              squeeze         Black 20x    Supine AROM flexion 2x10 30x cane flexion 30x cane 2x20 Cane         AROM flexion   2x10    1# 2x10  RPE 8/10 2x10 1# 2x12 1#        Pulley's 4' 4' 3' 4' 4'        Wall slide 2x10 2x20 2x20  2x20 2x20        Banded shld ext   1x15 GTB; 1x15 BTB 2x15 BTB 2x15 BTB         Banded row    2x15 Black TB 2x15 Black TB        Tricep push down   2x15 12#  2x15 14#        Bicep curl     5# " 2x10        UBE     3'/3'        Pt [facility] JOSE MIGUEL            Ther Activity                                       Gait Training                                       Modalities

## 2023-06-26 ENCOUNTER — OFFICE VISIT (OUTPATIENT)
Dept: PHYSICAL THERAPY | Facility: CLINIC | Age: 78
End: 2023-06-26
Payer: MEDICARE

## 2023-06-26 DIAGNOSIS — Z96.611 S/P REVERSE TOTAL SHOULDER ARTHROPLASTY, RIGHT: Primary | ICD-10-CM

## 2023-06-26 PROCEDURE — 97110 THERAPEUTIC EXERCISES: CPT | Performed by: PHYSICAL THERAPIST

## 2023-06-26 NOTE — PROGRESS NOTES
"Daily Note     Today's date: 2023  Patient name: Cass Magdaleno  : 1945  MRN: [id number]  Referring provider: Breana Meneses MD  Dx:   Encounter Diagnosis     ICD-10-CM    1  S/P reverse total shoulder arthroplasty, right  Z96 611                      Subjective: Pt reports some       Objective: See treatment diary below      Assessment: Tolerated treatment fair  Some increased neck symptoms with today's treatment  Some pain down the arm when holding 5# db for bicep curl  It resolved once she set the weight down  All others WNL  Added ER walkout, some ER strength noted which is a positive  Patient would benefit from continued PT      Plan: Continue per plan of care  Progress treatment as tolerated         Precautions: Cervical fusion C5-6    POC expires Auth Status Unit limit Start date  Expiration date PT/OT + Visit Limit?   23 None required [facility] 23 BOMN                                         Manuals                                                        Neuro Re-Ed             scap retraction 10x5\"         10x5\"   scap elevation 20x         10x5\"                                                                    Ther Ex             PROM-shld/elbow [address]   AAROM elbow flexion 2# 2x15 4# 1x20  3# 1x20     AROM 20x AROM 20x AROM 20x AROM 20x   Pendulums       20x ea CW/CCW/A/P/M/L 20x ea CW/CCW/A/P/M/L 20x ea CW/CCW/A/P/M/L 20x ea CW/CCW/A/P/M/L   Cervical R ROT with pt OP              squeeze         Black 20x    Supine AROM flexion 2x10 30x cane flexion 30x cane 2x20 Cane         AROM flexion   2x10    1# 2x10  RPE 8/10 2x10 1# 2x12 1# 2x12 1#       Pulley's 4' 4' 3' 4' 4' 4'       Wall slide 2x10 2x20 2x20  2x20 2x20 2x20       Banded shld ext   1x15 GTB; 1x15 BTB 2x15 BTB 2x15 BTB  2x20 BTB       Banded row    2x15 Black TB 2x15 Black TB 2x20 Black TB       Tricep push down   2x15 12#  2x15 14# 2x15 14#     " Bicep curl     5# 2x10 5# 2x12                    ER walkout      YTB 10x 2 steps       IR       RTB 2x10       UBE     3'/3' 3'/3'       Pt edu JOSE MIGUEL            Ther Activity                                       Gait Training                                       Modalities

## 2023-06-28 ENCOUNTER — OFFICE VISIT (OUTPATIENT)
Dept: PHYSICAL THERAPY | Facility: CLINIC | Age: 78
End: 2023-06-28
Payer: MEDICARE

## 2023-06-28 DIAGNOSIS — Z96.611 S/P REVERSE TOTAL SHOULDER ARTHROPLASTY, RIGHT: Primary | ICD-10-CM

## 2023-06-28 PROCEDURE — 97110 THERAPEUTIC EXERCISES: CPT | Performed by: PHYSICAL THERAPIST

## 2023-06-28 NOTE — PROGRESS NOTES
PT Discharge     Today's date: 2023  Patient name: Irvin Loredo  : 1945  MRN: [id number]  Referring provider: Clarita Durand MD  Dx:   Encounter Diagnosis     ICD-10-CM    1  S/P reverse total shoulder arthroplasty, right  Z96 611                      Assessment  Assessment details: Pt is a 69 y/o female who presents to physical therapy s/p R TSA on 23 with Dr Armando Ibrahim in the environment of previous lower cervical fusion  Pt has made good improvement since beginning physical therapy  She is confident in her ability to complete HEP  Max benefit from skilled physical therapy met  PT and pt discussed d/c, pt agreeable  Impairments: abnormal or restricted ROM, activity intolerance, impaired physical strength and pain with function  Understanding of Dx/Px/POC: good  Goals  STG (4 weeks):   Pt will be independent with HEP  - MET  Pt will demonstrate flexion ROM to 105d  - MET  Pt will demonstrate ER to 30d  - MET PROM    LTG (8 weeks): FOTO will be expected outcome  - MET  Pt will demonstrate painfree flexion ROM to 120d  - Ongoing  Pt will demonstrate AROM scaption to 100d  - MET        Plan  D/c      Subjective Evaluation    History of Present Illness  Mechanism of injury: Chief Complaint: Pt reports R TSA on 23  Pt reports that prior to surgery she was having a lot of pain and crepitus  She reports since the surgery, she has had some pain but is able to mitigate it via pain medication  She reports 0 pain today in the shoulder  She has had some pain in the medial elbow  States that she is having difficulty removing the sling by herself, and therefore, has it on all day until someone comes to her house to help her at night  Re (): Pt reports her shoulder continues to get better each day  Bruising is going down and her pain is less  She reports not wearing her sling as much throughout the day recently , and sometimes her shoulder can get sore if she is not in it as much       D/c "(6/28): Pt reports continued improvement  Pt reports no functional deficits  Severity: post op  Irritability: post op  Nature: nociceptive  Stage: acute  Stability: progressing    P1: post op    Physical Activity: wants to return to golf  Sleep: Recliner    Occupation: desk job  GHS: Bowel reduction in movements since surgery due to pain meds, has been able to go with the help of medication for this  Patient Goals  Patient goals for therapy: return to work  Patient goal: return to golfing        Objective     Postural Observations    Additional Postural Observation Details  Sling pad off of the neck, height in a good place    Cervical/Thoracic Screen   Cervical range of motion within normal limits with the following exceptions:  Will assess in the upcoming visits, does have a history of ACDF C5-6    Active Range of Motion     Flex: 105d  Abd: 75d  ER: 25d    Passive Range of Motion     Right Shoulder   Flexion: 110 degrees  External rotation 0° in plane of scap: 35d  Abd: 90d    Additional Passive Range of Motion Details  Empty end feel    Strength/Myotome Testing     Additional Strength Details  Not tested due to post op status             Precautions: Cervical fusion C5-6    POC expires Auth Status Unit limit Start date  Expiration date PT/OT + Visit Limit?   7/5/23 None required [facility] 4/26/23 12/31/23 BOMN                                       Manuals 6/5 6/13 6/16 6/19 6/21 6/26 5/17 5/22 5/24 6/2                                                       Neuro Re-Ed             scap retraction 10x5\"         10x5\"   scap elevation 20x         10x5\"                                                                    Ther Ex             PROM-shld/elbow [address]   AAROM elbow flexion 2# 2x15 4# 1x20  3# 1x20     AROM 20x AROM 20x AROM 20x AROM 20x   Pendulums       20x ea CW/CCW/A/P/M/L 20x ea CW/CCW/A/P/M/L 20x ea CW/CCW/A/P/M/L 20x ea CW/CCW/A/P/M/L   Cervical R ROT with pt OP              " squeeze         Black 20x    Supine AROM flexion 2x10 30x cane flexion 30x cane 2x20 Cane         AROM flexion   2x10    1# 2x10  RPE 8/10 2x10 1# 2x12 1# 2x12 1#       Pulley's 4' 4' 3' 4' 4' 4'       Wall slide 2x10 2x20 2x20  2x20 2x20 2x20       Banded shld ext   1x15 GTB; 1x15 BTB 2x15 BTB 2x15 BTB  2x20 BTB       Banded row    2x15 Black TB 2x15 Black TB 2x20 Black TB       Tricep push down   2x15 12#  2x15 14# 2x15 14#       Bicep curl     5# 2x10 5# 2x12                    ER walkout      YTB 10x 2 steps       IR       RTB 2x10       UBE     3'/3' 3'/3'       Pt edu JOSE MIGUEL            Ther Activity                                       Gait Training                                       Modalities

## 2023-07-08 DIAGNOSIS — E03.9 HYPOTHYROIDISM, UNSPECIFIED TYPE: ICD-10-CM

## 2023-07-08 RX ORDER — LEVOTHYROXINE, LIOTHYRONINE 57; 13.5 UG/1; UG/1
TABLET ORAL
Qty: 90 TABLET | Refills: 0 | Status: SHIPPED | OUTPATIENT
Start: 2023-07-08

## 2023-07-26 ENCOUNTER — APPOINTMENT (OUTPATIENT)
Dept: LAB | Facility: HOSPITAL | Age: 78
End: 2023-07-26
Payer: MEDICARE

## 2023-07-26 DIAGNOSIS — R73.03 PREDIABETES: ICD-10-CM

## 2023-07-26 DIAGNOSIS — I25.119 ATHEROSCLEROSIS OF NATIVE CORONARY ARTERY WITH ANGINA PECTORIS, UNSPECIFIED WHETHER NATIVE OR TRANSPLANTED HEART (HCC): ICD-10-CM

## 2023-07-26 DIAGNOSIS — E03.9 HYPOTHYROIDISM, UNSPECIFIED TYPE: ICD-10-CM

## 2023-07-26 DIAGNOSIS — E78.5 HYPERLIPIDEMIA, UNSPECIFIED HYPERLIPIDEMIA TYPE: ICD-10-CM

## 2023-07-26 DIAGNOSIS — I10 ESSENTIAL HYPERTENSION, MALIGNANT: ICD-10-CM

## 2023-07-26 DIAGNOSIS — R73.01 IMPAIRED FASTING GLUCOSE: ICD-10-CM

## 2023-07-26 DIAGNOSIS — M79.18 DIFFUSE MYOFASCIAL PAIN SYNDROME: ICD-10-CM

## 2023-07-26 LAB
ALBUMIN SERPL BCP-MCNC: 4.6 G/DL (ref 3.5–5)
ALP SERPL-CCNC: 53 U/L (ref 34–104)
ALT SERPL W P-5'-P-CCNC: 14 U/L (ref 7–52)
ANION GAP SERPL CALCULATED.3IONS-SCNC: 7 MMOL/L
AST SERPL W P-5'-P-CCNC: 21 U/L (ref 13–39)
BASOPHILS # BLD AUTO: 0.08 THOUSANDS/ÂΜL (ref 0–0.1)
BASOPHILS NFR BLD AUTO: 1 % (ref 0–1)
BILIRUB SERPL-MCNC: 0.46 MG/DL (ref 0.2–1)
BUN SERPL-MCNC: 21 MG/DL (ref 5–25)
CALCIUM SERPL-MCNC: 10.2 MG/DL (ref 8.4–10.2)
CHLORIDE SERPL-SCNC: 103 MMOL/L (ref 96–108)
CHOLEST SERPL-MCNC: 167 MG/DL
CO2 SERPL-SCNC: 30 MMOL/L (ref 21–32)
CREAT SERPL-MCNC: 0.91 MG/DL (ref 0.6–1.3)
EOSINOPHIL # BLD AUTO: 0.6 THOUSAND/ÂΜL (ref 0–0.61)
EOSINOPHIL NFR BLD AUTO: 10 % (ref 0–6)
ERYTHROCYTE [DISTWIDTH] IN BLOOD BY AUTOMATED COUNT: 14.8 % (ref 11.6–15.1)
EST. AVERAGE GLUCOSE BLD GHB EST-MCNC: 117 MG/DL
GFR SERPL CREATININE-BSD FRML MDRD: 61 ML/MIN/1.73SQ M
GLUCOSE P FAST SERPL-MCNC: 107 MG/DL (ref 65–99)
HBA1C MFR BLD: 5.7 %
HCT VFR BLD AUTO: 38.4 % (ref 34.8–46.1)
HDLC SERPL-MCNC: 79 MG/DL
HGB BLD-MCNC: 12.5 G/DL (ref 11.5–15.4)
IMM GRANULOCYTES # BLD AUTO: 0.01 THOUSAND/UL (ref 0–0.2)
IMM GRANULOCYTES NFR BLD AUTO: 0 % (ref 0–2)
LDLC SERPL CALC-MCNC: 73 MG/DL (ref 0–100)
LYMPHOCYTES # BLD AUTO: 1.53 THOUSANDS/ÂΜL (ref 0.6–4.47)
LYMPHOCYTES NFR BLD AUTO: 27 % (ref 14–44)
MCH RBC QN AUTO: 30.8 PG (ref 26.8–34.3)
MCHC RBC AUTO-ENTMCNC: 32.6 G/DL (ref 31.4–37.4)
MCV RBC AUTO: 95 FL (ref 82–98)
MONOCYTES # BLD AUTO: 0.4 THOUSAND/ÂΜL (ref 0.17–1.22)
MONOCYTES NFR BLD AUTO: 7 % (ref 4–12)
NEUTROPHILS # BLD AUTO: 3.14 THOUSANDS/ÂΜL (ref 1.85–7.62)
NEUTS SEG NFR BLD AUTO: 55 % (ref 43–75)
NONHDLC SERPL-MCNC: 88 MG/DL
NRBC BLD AUTO-RTO: 0 /100 WBCS
PLATELET # BLD AUTO: 246 THOUSANDS/UL (ref 149–390)
PMV BLD AUTO: 9.1 FL (ref 8.9–12.7)
POTASSIUM SERPL-SCNC: 3.8 MMOL/L (ref 3.5–5.3)
PROT SERPL-MCNC: 7.8 G/DL (ref 6.4–8.4)
RBC # BLD AUTO: 4.06 MILLION/UL (ref 3.81–5.12)
SODIUM SERPL-SCNC: 140 MMOL/L (ref 135–147)
TRIGL SERPL-MCNC: 74 MG/DL
TSH SERPL DL<=0.05 MIU/L-ACNC: 2.58 UIU/ML (ref 0.45–4.5)
URATE SERPL-MCNC: 9 MG/DL (ref 2–7.5)
WBC # BLD AUTO: 5.76 THOUSAND/UL (ref 4.31–10.16)

## 2023-07-26 PROCEDURE — 80053 COMPREHEN METABOLIC PANEL: CPT

## 2023-07-26 PROCEDURE — 36415 COLL VENOUS BLD VENIPUNCTURE: CPT

## 2023-07-26 PROCEDURE — 84550 ASSAY OF BLOOD/URIC ACID: CPT

## 2023-07-26 PROCEDURE — 84443 ASSAY THYROID STIM HORMONE: CPT

## 2023-07-26 PROCEDURE — 85025 COMPLETE CBC W/AUTO DIFF WBC: CPT

## 2023-07-26 PROCEDURE — 83036 HEMOGLOBIN GLYCOSYLATED A1C: CPT

## 2023-07-26 PROCEDURE — 80061 LIPID PANEL: CPT

## 2023-08-04 ENCOUNTER — RA CDI HCC (OUTPATIENT)
Dept: OTHER | Facility: HOSPITAL | Age: 78
End: 2023-08-04

## 2023-08-14 ENCOUNTER — OFFICE VISIT (OUTPATIENT)
Dept: FAMILY MEDICINE CLINIC | Facility: CLINIC | Age: 78
End: 2023-08-14
Payer: MEDICARE

## 2023-08-14 VITALS
SYSTOLIC BLOOD PRESSURE: 126 MMHG | WEIGHT: 147 LBS | TEMPERATURE: 97.4 F | HEART RATE: 71 BPM | BODY MASS INDEX: 25.1 KG/M2 | OXYGEN SATURATION: 95 % | DIASTOLIC BLOOD PRESSURE: 78 MMHG | HEIGHT: 64 IN

## 2023-08-14 DIAGNOSIS — I10 PRIMARY HYPERTENSION: Primary | ICD-10-CM

## 2023-08-14 DIAGNOSIS — E79.0 HYPERURICEMIA: ICD-10-CM

## 2023-08-14 DIAGNOSIS — E03.9 HYPOTHYROIDISM, UNSPECIFIED TYPE: ICD-10-CM

## 2023-08-14 DIAGNOSIS — R73.03 PREDIABETES: ICD-10-CM

## 2023-08-14 DIAGNOSIS — E78.5 HYPERLIPIDEMIA, UNSPECIFIED HYPERLIPIDEMIA TYPE: ICD-10-CM

## 2023-08-14 DIAGNOSIS — C50.919 MALIGNANT NEOPLASM OF FEMALE BREAST, UNSPECIFIED ESTROGEN RECEPTOR STATUS, UNSPECIFIED LATERALITY, UNSPECIFIED SITE OF BREAST (HCC): ICD-10-CM

## 2023-08-14 DIAGNOSIS — N18.31 STAGE 3A CHRONIC KIDNEY DISEASE (HCC): ICD-10-CM

## 2023-08-14 DIAGNOSIS — M19.90 ARTHRITIS: ICD-10-CM

## 2023-08-14 DIAGNOSIS — J45.909 UNCOMPLICATED ASTHMA, UNSPECIFIED ASTHMA SEVERITY, UNSPECIFIED WHETHER PERSISTENT: ICD-10-CM

## 2023-08-14 PROBLEM — M25.512 BILATERAL SHOULDER PAIN: Status: RESOLVED | Noted: 2022-08-29 | Resolved: 2023-08-14

## 2023-08-14 PROBLEM — M25.511 BILATERAL SHOULDER PAIN: Status: RESOLVED | Noted: 2022-08-29 | Resolved: 2023-08-14

## 2023-08-14 PROBLEM — Z01.818 PREOPERATIVE EXAMINATION: Status: RESOLVED | Noted: 2019-12-10 | Resolved: 2023-08-14

## 2023-08-14 PROBLEM — M70.41 PREPATELLAR BURSITIS OF RIGHT KNEE: Status: RESOLVED | Noted: 2022-10-31 | Resolved: 2023-08-14

## 2023-08-14 PROCEDURE — 99214 OFFICE O/P EST MOD 30 MIN: CPT | Performed by: FAMILY MEDICINE

## 2023-08-14 RX ORDER — IBUPROFEN 400 MG/1
400 TABLET ORAL 3 TIMES DAILY PRN
Qty: 90 TABLET | Refills: 5 | Status: SHIPPED | OUTPATIENT
Start: 2023-08-14

## 2023-08-14 RX ORDER — IBUPROFEN 400 MG/1
400 TABLET ORAL 3 TIMES DAILY PRN
Qty: 90 TABLET | Refills: 1 | Status: CANCELLED | OUTPATIENT
Start: 2023-08-14

## 2023-08-14 RX ORDER — ALLOPURINOL 100 MG/1
100 TABLET ORAL EVERY MORNING
COMMUNITY
Start: 2023-08-02

## 2023-08-14 NOTE — PROGRESS NOTES
Name: Leslye Doshi      : 1945      MRN: [id number]  Encounter Provider: Gracie Nam MD  Encounter Date: 2023   Encounter department: [address] [place] [address]   Return visit in 6 months with fasting blood prior to visit. 1. Primary hypertension  Assessment & Plan:  Continue losartan 50 mg and hydrochlorothiazide 12.5 mg daily      2. Hypothyroidism, unspecified type  Assessment & Plan:  Continue thyroid milligrams daily    Orders:  -     TSH, 3rd generation with Free T4 reflex; Future    3. Uncomplicated asthma, unspecified asthma severity, unspecified whether persistent  Assessment & Plan:  Continue Trelegy Ellipta      4. Stage 3a chronic kidney disease ([facility])    5. Malignant neoplasm of female breast, unspecified estrogen receptor status, unspecified laterality, unspecified site of breast ([facility])    6. Prediabetes  -     Hemoglobin A1C; Future    7. Hyperlipidemia, unspecified hyperlipidemia type  Assessment & Plan:  Continue Nexletol 180 mg daily    Orders:  -     CBC and differential; Future  -     Comprehensive metabolic panel; Future  -     Lipid panel; Future    8. Hyperuricemia  Assessment & Plan:  Continue allopurinol 100 mg daily    Orders:  -     Uric acid; Future    9. Arthritis  -     ibuprofen (MOTRIN) 400 mg tablet; Take 1 tablet (400 mg total) by mouth 3 (three) times a day as needed for mild pain      Depression Screening and Follow-up Plan: Patient was screened for depression during today's encounter. They screened negative with a PHQ-2 score of 0. Falls Plan of Care: balance, strength, and gait training instructions were provided. Subjective      Shoulder replacement and is doing well with this. Her cardiologist started on allopurinol for elevated uric acid level. Review of Systems   Constitutional: Negative. Respiratory: Negative. Cardiovascular: Negative. Musculoskeletal: Positive for arthralgias. Current Outpatient Medications on File Prior to Visit   Medication Sig   • allopurinol (ZYLOPRIM) 100 mg tablet Take 100 mg by mouth every morning   • APPLE CIDER VINEGAR PO apple cider vinegar   • cetirizine (ZyrTEC) 10 mg tablet Take 10 mg by mouth daily   • Cholecalciferol (Vitamin D3) 1.25 MG (87630 UT) CAPS Vitamin D3   • [employer] TURMERIC CURCUMIN PO Curcumin   • hydrochlorothiazide (HYDRODIURIL) 12.5 mg tablet Take 1 tablet (12.5 mg total) by mouth daily   • indomethacin (INDOCIN) 50 mg capsule Take 1 capsule (50 mg total) by mouth 2 (two) times a day with meals   • KRILL OIL PO krill oil   • losartan (COZAAR) 50 mg tablet Take 1 tablet (50 mg total) by mouth daily   • montelukast (SINGULAIR) 10 mg tablet Take 10 mg by mouth every evening   • Multiple Vitamin (RA ONE DAILY MULTI-VITAMIN PO) Daily Multi-Vitamin   • Nexletol 180 MG TABS Take 1 tablet by mouth every morning   • NP Thyroid 90 MG tablet Take 1 tablet by mouth once daily   • Probiotic Product (PROBIOTIC BLEND PO) Probiotic   • Restasis 0.05 % ophthalmic emulsion INSTILL 1 DROP INTO EACH EYE TWICE DAILY   • SUPER B COMPLEX/C PO Super B Complex   • Trelegy Ellipta 100-62.5-25 MCG/INH inhaler Inhale 1 puff daily   • triamcinolone (KENALOG) 0.5 % cream Apply topically 3 (three) times a day   • [DISCONTINUED] ibuprofen (MOTRIN) 400 mg tablet Take 1 tablet (400 mg total) by mouth 3 (three) times a day as needed for mild pain   • EPINEPHrine (EpiPen 2-Ever) 0.3 mg/0.3 mL SOAJ Inject 0.3 mL (0.3 mg total) into a muscle once for 1 dose   • [DISCONTINUED] mupirocin (BACTROBAN) 2 % ointment mupirocin 2 % topical ointment   APPLY TO EACH NARE TWICE A DAY FOR 5 DAYS PRIOR TO SURGERY       Objective     /78 (BP Location: Left arm, Patient Position: Sitting, Cuff Size: Adult)   Pulse 71   Temp (!) 97.4 °F (36.3 °C) (Tympanic)   Ht 5' 4" (1.626 m)   Wt 66.7 kg (147 lb)   SpO2 95%   BMI 25.23 kg/m²     Physical Exam  Constitutional:       Appearance: Normal appearance. She is well-developed. HENT:      Head: Normocephalic and atraumatic. Right Ear: Tympanic membrane normal.   Eyes:      Pupils: Pupils are equal, round, and reactive to light. Neck:      Thyroid: No thyromegaly. Cardiovascular:      Rate and Rhythm: Normal rate and regular rhythm. Heart sounds: Normal heart sounds. Pulmonary:      Effort: Pulmonary effort is normal.      Breath sounds: Normal breath sounds. Musculoskeletal:         General: Normal range of motion. Cervical back: Neck supple. Lymphadenopathy:      Cervical: No cervical adenopathy. Skin:     General: Skin is warm and dry. Neurological:      Mental Status: She is alert.    Psychiatric:         Mood and Affect: Mood normal.         Behavior: Behavior normal.       Gerre Paget, MD

## 2023-09-06 DIAGNOSIS — M19.90 ARTHRITIS: Primary | ICD-10-CM

## 2023-09-06 RX ORDER — METHYLPREDNISOLONE 4 MG/1
TABLET ORAL
Qty: 21 EACH | Refills: 0 | Status: SHIPPED | OUTPATIENT
Start: 2023-09-06

## 2023-09-14 DIAGNOSIS — J06.9 ACUTE URI: Primary | ICD-10-CM

## 2023-09-14 DIAGNOSIS — J06.9 ACUTE URI: ICD-10-CM

## 2023-09-14 RX ORDER — AZITHROMYCIN 250 MG/1
TABLET, FILM COATED ORAL
Qty: 6 TABLET | Refills: 0 | Status: SHIPPED | OUTPATIENT
Start: 2023-09-14 | End: 2023-09-14

## 2023-09-14 RX ORDER — AZITHROMYCIN 250 MG/1
TABLET, FILM COATED ORAL
Qty: 6 TABLET | Refills: 0 | Status: SHIPPED | OUTPATIENT
Start: 2023-09-14 | End: 2023-09-19

## 2023-09-14 RX ORDER — AZITHROMYCIN 250 MG/1
TABLET, FILM COATED ORAL
Qty: 6 TABLET | Refills: 0 | OUTPATIENT
Start: 2023-09-14

## 2023-09-15 ENCOUNTER — TELEPHONE (OUTPATIENT)
Dept: FAMILY MEDICINE CLINIC | Facility: CLINIC | Age: 78
End: 2023-09-15

## 2023-09-15 NOTE — TELEPHONE ENCOUNTER
T/c from [address] [place] [address] --    Report a script was sent over for pt yesterday for azithromycin, however, according to [facility] records, the pt is allergic to this. Pharmacist also reports pt is allergice to several other medications (verified these are listed on her allergies list for the pharmacist). Is requesting an option that the pt is not allergic to. Please advise.

## 2023-09-15 NOTE — TELEPHONE ENCOUNTER
Spoke with patient to she states that she's not allergic to zpack and will be heading to pharmacy to pick it up pt will call us if she has issues

## 2023-10-17 DIAGNOSIS — E03.9 HYPOTHYROIDISM, UNSPECIFIED TYPE: ICD-10-CM

## 2023-10-17 RX ORDER — LEVOTHYROXINE, LIOTHYRONINE 57; 13.5 UG/1; UG/1
TABLET ORAL
Qty: 90 TABLET | Refills: 0 | Status: SHIPPED | OUTPATIENT
Start: 2023-10-17

## 2023-11-17 ENCOUNTER — OFFICE VISIT (OUTPATIENT)
Age: 78
End: 2023-11-17
Payer: MEDICARE

## 2023-11-17 VITALS
WEIGHT: 150 LBS | SYSTOLIC BLOOD PRESSURE: 131 MMHG | RESPIRATION RATE: 18 BRPM | DIASTOLIC BLOOD PRESSURE: 83 MMHG | OXYGEN SATURATION: 96 % | TEMPERATURE: 97.9 F | HEART RATE: 87 BPM | HEIGHT: 65 IN | BODY MASS INDEX: 24.99 KG/M2

## 2023-11-17 DIAGNOSIS — M26.609 TMJ (TEMPOROMANDIBULAR JOINT DISORDER): Primary | ICD-10-CM

## 2023-11-17 PROBLEM — U07.1 COVID-19 VIRUS INFECTION: Status: ACTIVE | Noted: 2021-11-01

## 2023-11-17 PROCEDURE — G0463 HOSPITAL OUTPT CLINIC VISIT: HCPCS

## 2023-11-17 PROCEDURE — 99213 OFFICE O/P EST LOW 20 MIN: CPT

## 2023-11-17 RX ORDER — METHOCARBAMOL 500 MG/1
500 TABLET, FILM COATED ORAL
Qty: 7 TABLET | Refills: 0 | Status: SHIPPED | OUTPATIENT
Start: 2023-11-17 | End: 2023-11-24

## 2023-11-17 RX ORDER — LIDOCAINE HYDROCHLORIDE 10 MG/ML
INJECTION, SOLUTION INFILTRATION; PERINEURAL
COMMUNITY
Start: 2023-09-18

## 2023-11-17 RX ORDER — NAPROXEN 500 MG/1
500 TABLET ORAL 2 TIMES DAILY WITH MEALS
Qty: 14 TABLET | Refills: 0 | Status: SHIPPED | OUTPATIENT
Start: 2023-11-17 | End: 2023-11-24

## 2023-11-17 NOTE — PATIENT INSTRUCTIONS
Take Naproxen as directed for next 7 days for pain and Robaxin as directed before going to sleep. Do not take Naproxen with any other NSAIDS (ibuprofen, advil, aspirin, aleve) and do not take Robaxin with any other sedating medications or during daytime hours as it may cause drowsiness. Follow-up with oral maxillary surgery for further management of symptoms. Report to the ER sooner if symptoms worsen.

## 2023-11-17 NOTE — PROGRESS NOTES
[facility]        NAME: Og Thomason is a 70 y.o. female  : 1945    MRN: [id number]  DATE: 2023  TIME: 1:21 PM    Assessment and Plan   TMJ (temporomandibular joint disorder) [M26.609]  1. TMJ (temporomandibular joint disorder)  methocarbamol (ROBAXIN) 500 mg tablet    Ambulatory Referral to Oral Maxillofacial Surgery    naproxen (Naprosyn) 500 mg tablet        Physical exam consistent with TMJ disorder, medications as directed. Referral placed to oral maxillary surgery for further management. Patient Instructions     Take Naproxen as directed for next 7 days for pain and Robaxin as directed before going to sleep. Do not take Naproxen with any other NSAIDS (ibuprofen, advil, aspirin, aleve) and do not take Robaxin with any other sedating medications or during daytime hours as it may cause drowsiness. Follow-up with oral maxillary surgery for further management of symptoms. Report to the ER sooner if symptoms worsen. Chief Complaint     Chief Complaint   Patient presents with    Jaw Pain     Started 5 days ago. Patient complains of right jaw pain. History of Present Illness       70year old female presents for evaluation of right sided jaw pain that started 5 days ago. She denies any known injury but reports history of TMJD in the past when she was a teenager. She does report increased stress lately. She denies associated fevers, fatigue, body aches, dental pain, difficulty swallowing, or shortness of breath. She has taken Advil for pain with minimal relief. Oral Pain   This is a new problem. The current episode started in the past 7 days. The problem occurs constantly. The problem has been unchanged. The pain is at a severity of 8/10. The pain is moderate. Pertinent negatives include no difficulty swallowing, facial pain, fever, oral bleeding, sinus pressure or thermal sensitivity. She has tried NSAIDs for the symptoms. The treatment provided no relief. Review of Systems   Review of Systems   Constitutional:  Negative for activity change, appetite change, chills, fatigue and fever. HENT:  Negative for congestion, rhinorrhea, sinus pressure and sore throat. Respiratory:  Negative for cough, chest tightness and shortness of breath. Cardiovascular:  Negative for chest pain. Gastrointestinal:  Negative for abdominal pain, constipation, diarrhea, nausea and vomiting. Skin:  Negative for color change and pallor. Allergic/Immunologic: Negative for environmental allergies and food allergies.          Current Medications       Current Outpatient Medications:     allopurinol (ZYLOPRIM) 100 mg tablet, Take 100 mg by mouth every morning, Disp: , Rfl:     APPLE CIDER VINEGAR PO, apple cider vinegar, Disp: , Rfl:     cetirizine (ZyrTEC) 10 mg tablet, Take 10 mg by mouth daily, Disp: , Rfl:     Cholecalciferol (Vitamin D3) 1.25 MG (29634 UT) CAPS, Vitamin D3, Disp: , Rfl:     [employer] TURMERIC CURCUMIN PO, Curcumin, Disp: , Rfl:     EPINEPHrine (EpiPen 2-Ever) 0.3 mg/0.3 mL SOAJ, Inject 0.3 mL (0.3 mg total) into a muscle once for 1 dose, Disp: 0.6 mL, Rfl: 1    hydrochlorothiazide (HYDRODIURIL) 12.5 mg tablet, Take 1 tablet (12.5 mg total) by mouth daily, Disp: 90 tablet, Rfl: 5    ibuprofen (MOTRIN) 400 mg tablet, Take 1 tablet (400 mg total) by mouth 3 (three) times a day as needed for mild pain, Disp: 90 tablet, Rfl: 5    KRILL OIL PO, krill oil, Disp: , Rfl:     lidocaine (Xylocaine) 1 %, Take 3 mL by injection route., Disp: , Rfl:     losartan (COZAAR) 50 mg tablet, Take 1 tablet (50 mg total) by mouth daily, Disp: 90 tablet, Rfl: 5    methocarbamol (ROBAXIN) 500 mg tablet, Take 1 tablet (500 mg total) by mouth daily at bedtime as needed for muscle spasms for up to 7 days, Disp: 7 tablet, Rfl: 0    methylPREDNISolone 4 MG tablet therapy pack, Use as directed on package, Disp: 21 each, Rfl: 0    Multiple Vitamin (RA ONE DAILY MULTI-VITAMIN PO), Daily Multi-Vitamin, Disp: , Rfl:     mupirocin (BACTROBAN) 2 % ointment, Apply to each nare twice a day for five days prior to surgery, Disp: , Rfl:     naproxen (Naprosyn) 500 mg tablet, Take 1 tablet (500 mg total) by mouth 2 (two) times a day with meals for 7 days, Disp: 14 tablet, Rfl: 0    Nexletol 180 MG TABS, Take 1 tablet by mouth every morning, Disp: , Rfl:     NP Thyroid 90 MG tablet, Take 1 tablet by mouth once daily, Disp: 90 tablet, Rfl: 0    Probiotic Product (PROBIOTIC BLEND PO), Probiotic, Disp: , Rfl:     Restasis 0.05 % ophthalmic emulsion, INSTILL 1 DROP INTO EACH EYE TWICE DAILY, Disp: , Rfl:     SUPER B COMPLEX/C PO, Super B Complex, Disp: , Rfl:     Trelegy Ellipta 100-62.5-25 MCG/INH inhaler, Inhale 1 puff daily, Disp: , Rfl:     triamcinolone (KENALOG) 0.5 % cream, Apply topically 3 (three) times a day, Disp: 30 g, Rfl: 5    montelukast (SINGULAIR) 10 mg tablet, Take 10 mg by mouth every evening (Patient not taking: Reported on 11/17/2023), Disp: , Rfl:     Current Allergies     Allergies as of 11/17/2023 - Reviewed 11/17/2023   Allergen Reaction Noted    Oxycontin [oxycodone hcl] Dizziness 04/03/2023    Amoxicillin Rash 12/09/2014    Cefprozil Rash 12/09/2014    Cephalexin Rash 12/09/2014    Ciprofloxacin Rash 06/15/2016    Doxycycline Rash 06/20/2017    Erythromycin Rash 01/15/2015    Levofloxacin Rash 02/08/2016    Penicillins Rash 06/15/2016            The following portions of the patient's history were reviewed and updated as appropriate: allergies, current medications, past family history, past medical history, past social history, past surgical history and problem list.     Past Medical History:   Diagnosis Date    Asthma     Cancer ([address])     breast    Disease of thyroid gland     Hyperlipidemia     Hypertension        Past Surgical History:   Procedure Laterality Date    BACK SURGERY      L6! Correct--L6.     BREAST BIOPSY      reconstruction    HAND SURGERY Right     Thumb/Wrist Prosthesis    LUMBAR LAMINECTOMY      MASTECTOMY      NECK SURGERY      C4,5,6    HI ARTHROPLASTY GLENOHUMERAL JOINT TOTAL SHOULDER Right 4/21/2023    Procedure: Reverse total shoulder replacement;  Surgeon: Domenico Hahn MD;  Location: [facility];  Service: Orthopedics       Family History   Problem Relation Age of Onset    Cancer Mother     Diabetes Mother     Heart disease Mother     Cancer Father          Medications have been verified. Objective   /83   Pulse 87   Temp 97.9 °F (36.6 °C)   Resp 18   Ht 5' 4.5" (1.638 m)   Wt 68 kg (150 lb)   SpO2 96%   BMI 25.35 kg/m²        Physical Exam     Physical Exam  Vitals and nursing note reviewed. Constitutional:       General: She is awake. Appearance: Normal appearance. She is well-developed and normal weight. HENT:      Head: Normocephalic and atraumatic. Jaw: Tenderness and pain on movement present. No trismus, swelling or malocclusion. Comments: Clicking heard over jaw when opening and closing mouth     Right Ear: Hearing, tympanic membrane, ear canal and external ear normal.      Left Ear: Hearing, tympanic membrane, ear canal and external ear normal.      Nose: No congestion or rhinorrhea. Right Turbinates: Not enlarged, swollen or pale. Left Turbinates: Not enlarged, swollen or pale. Right Sinus: No maxillary sinus tenderness or frontal sinus tenderness. Left Sinus: No maxillary sinus tenderness or frontal sinus tenderness. Mouth/Throat:      Lips: Pink. Mouth: Mucous membranes are moist.      Dentition: Normal dentition. No dental tenderness or dental abscesses. Pharynx: Oropharynx is clear. Uvula midline. No oropharyngeal exudate or posterior oropharyngeal erythema. Eyes:      Conjunctiva/sclera: Conjunctivae normal.   Cardiovascular:      Rate and Rhythm: Normal rate and regular rhythm. Pulses: Normal pulses. Heart sounds: Normal heart sounds.    Pulmonary:      Effort: Pulmonary effort is normal.      Breath sounds: Normal breath sounds. Musculoskeletal:      Cervical back: Full passive range of motion without pain, normal range of motion and neck supple. Lymphadenopathy:      Cervical: No cervical adenopathy. Skin:     General: Skin is warm and dry. Neurological:      General: No focal deficit present. Mental Status: She is alert and oriented to person, place, and time. Psychiatric:         Mood and Affect: Mood normal.         Behavior: Behavior normal. Behavior is cooperative. Thought Content:  Thought content normal.         Judgment: Judgment normal.

## 2023-11-28 ENCOUNTER — TELEPHONE (OUTPATIENT)
Dept: FAMILY MEDICINE CLINIC | Facility: CLINIC | Age: 78
End: 2023-11-28

## 2023-11-28 DIAGNOSIS — J06.9 ACUTE URI: Primary | ICD-10-CM

## 2023-11-28 RX ORDER — AZITHROMYCIN 250 MG/1
TABLET, FILM COATED ORAL
Qty: 6 TABLET | Refills: 0 | Status: SHIPPED | OUTPATIENT
Start: 2023-11-28 | End: 2023-12-03

## 2023-11-28 NOTE — TELEPHONE ENCOUNTER
T/c from pt -- sore throat and cough since Saturday. States Theraflu doesn't seem to be helping. Pt states she took a covid test yesterday and was negative. Pt asking for an appt today, but there are no openings with any providers. Pt stated that this is the 2nd time she has called and she wasn't able to get in. Informed pt I would send provider a message to see if they can prescribe something for her.        Please advise

## 2023-11-28 NOTE — TELEPHONE ENCOUNTER
T/c to pt - pt would like something called in today (she can barely swallow) but also scheduled the appt tomorrow to see you at her request.

## 2023-11-29 ENCOUNTER — OFFICE VISIT (OUTPATIENT)
Dept: FAMILY MEDICINE CLINIC | Facility: CLINIC | Age: 78
End: 2023-11-29
Payer: MEDICARE

## 2023-11-29 VITALS
WEIGHT: 148 LBS | TEMPERATURE: 97.3 F | HEART RATE: 98 BPM | BODY MASS INDEX: 24.66 KG/M2 | SYSTOLIC BLOOD PRESSURE: 112 MMHG | DIASTOLIC BLOOD PRESSURE: 64 MMHG | OXYGEN SATURATION: 100 % | HEIGHT: 65 IN

## 2023-11-29 DIAGNOSIS — J02.9 SORE THROAT: Primary | ICD-10-CM

## 2023-11-29 PROBLEM — U07.1 COVID-19 VIRUS INFECTION: Status: RESOLVED | Noted: 2021-11-01 | Resolved: 2023-11-29

## 2023-11-29 PROBLEM — H26.9 CATARACT: Status: RESOLVED | Noted: 2019-12-10 | Resolved: 2023-11-29

## 2023-11-29 LAB
SARS-COV-2 AG UPPER RESP QL IA: NEGATIVE
VALID CONTROL: NORMAL

## 2023-11-29 PROCEDURE — 87811 SARS-COV-2 COVID19 W/OPTIC: CPT | Performed by: FAMILY MEDICINE

## 2023-11-29 PROCEDURE — 99213 OFFICE O/P EST LOW 20 MIN: CPT | Performed by: FAMILY MEDICINE

## 2023-11-29 NOTE — PROGRESS NOTES
Name: Makeda Mcclure      : 1945      MRN: [id number]  Encounter Provider: Maki Sr MD  Encounter Date: 2023   Encounter department: [address] [place] [address]. Sore throat  Assessment & Plan:  Finish Z-Ever    Orders:  -     POCT Rapid Covid Ag           Subjective     Patient comes in with a 3-day history of sinus congestion and sore throat. She started on Z-Ever yesterday and feels she is improving. Sore Throat   Associated symptoms include congestion and coughing. Review of Systems   Constitutional: Negative. HENT:  Positive for congestion and sore throat. Respiratory:  Positive for cough. Cardiovascular: Negative. Past Medical History:   Diagnosis Date    Arthritis     Asthma     Cancer ([address])     breast    Disease of thyroid gland     Hyperlipidemia     Hypertension      Past Surgical History:   Procedure Laterality Date    BACK SURGERY      L6! Correct--L6.     BREAST BIOPSY      reconstruction    EYE SURGERY      HAND SURGERY Right     Thumb/Wrist Prosthesis    JOINT REPLACEMENT      LUMBAR LAMINECTOMY      MASTECTOMY      NECK SURGERY      C4,5,6    DE ARTHROPLASTY GLENOHUMERAL JOINT TOTAL SHOULDER Right 2023    Procedure: Reverse total shoulder replacement;  Surgeon: Rc Garcia MD;  Location: [facility] OR;  Service: Orthopedics     Family History   Problem Relation Age of Onset    Cancer Mother     Diabetes Mother     Heart disease Mother     Cancer Father      Social History     Socioeconomic History    Marital status:      Spouse name: None    Number of children: None    Years of education: None    Highest education level: None   Occupational History    None   Tobacco Use    Smoking status: Former     Packs/day: 2.00     Years: 30.00     Total pack years: 60.00     Types: Cigarettes     Quit date: 1986     Years since quittin.0    Smokeless tobacco: Never   Vaping Use    Vaping Use: Never used   Substance and Sexual Activity    Alcohol use: Yes     Alcohol/week: 14.0 standard drinks of alcohol     Types: 14 Glasses of wine per week     Comment: 1-2 daily    Drug use: Yes     Types: Marijuana     Comment: medical for sleep    Sexual activity: None   Other Topics Concern    None   Social History Narrative    Golf     Never uses seat belt      Social Determinants of Health     Financial Resource Strain: Low Risk  (2/13/2023)    Overall Financial Resource Strain (CARDIA)     Difficulty of Paying Living Expenses: Not hard at all   Food Insecurity: Not on file   Transportation Needs: No Transportation Needs (2/13/2023)    PRAPARE - Transportation     Lack of Transportation (Medical): No     Lack of Transportation (Non-Medical): No   Physical Activity: Not on file   Stress: Not on file   Social Connections: Not on file   Intimate Partner Violence: Not on file   Housing Stability: Not on file     Current Outpatient Medications on File Prior to Visit   Medication Sig    allopurinol (ZYLOPRIM) 100 mg tablet Take 100 mg by mouth every morning    APPLE CIDER VINEGAR PO apple cider vinegar    azithromycin (Zithromax) 250 mg tablet Take 2 tablets (500 mg total) by mouth daily for 1 day, THEN 1 tablet (250 mg total) daily for 4 days. cetirizine (ZyrTEC) 10 mg tablet Take 10 mg by mouth daily    Cholecalciferol (Vitamin D3) 1.25 MG (37588 UT) CAPS Vitamin D3    [employer] TURMERIC CURCUMIN PO Curcumin    hydrochlorothiazide (HYDRODIURIL) 12.5 mg tablet Take 1 tablet (12.5 mg total) by mouth daily    ibuprofen (MOTRIN) 400 mg tablet Take 1 tablet (400 mg total) by mouth 3 (three) times a day as needed for mild pain    KRILL OIL PO krill oil    lidocaine (Xylocaine) 1 % Take 3 mL by injection route.     losartan (COZAAR) 50 mg tablet Take 1 tablet (50 mg total) by mouth daily    Multiple Vitamin (RA ONE DAILY MULTI-VITAMIN PO) Daily Multi-Vitamin    Nexletol 180 MG TABS Take 1 tablet by mouth every morning    NP Thyroid 90 MG tablet Take 1 tablet by mouth once daily    Probiotic Product (PROBIOTIC BLEND PO) Probiotic    Restasis 0.05 % ophthalmic emulsion INSTILL 1 DROP INTO EACH EYE TWICE DAILY    SUPER B COMPLEX/C PO Super B Complex    Trelegy Ellipta 100-62.5-25 MCG/INH inhaler Inhale 1 puff daily    triamcinolone (KENALOG) 0.5 % cream Apply topically 3 (three) times a day    EPINEPHrine (EpiPen 2-Ever) 0.3 mg/0.3 mL SOAJ Inject 0.3 mL (0.3 mg total) into a muscle once for 1 dose    naproxen (Naprosyn) 500 mg tablet Take 1 tablet (500 mg total) by mouth 2 (two) times a day with meals for 7 days    [DISCONTINUED] methocarbamol (ROBAXIN) 500 mg tablet Take 1 tablet (500 mg total) by mouth daily at bedtime as needed for muscle spasms for up to 7 days    [DISCONTINUED] methylPREDNISolone 4 MG tablet therapy pack Use as directed on package    [DISCONTINUED] montelukast (SINGULAIR) 10 mg tablet Take 10 mg by mouth every evening (Patient not taking: Reported on 11/17/2023)    [DISCONTINUED] mupirocin (BACTROBAN) 2 % ointment Apply to each nare twice a day for five days prior to surgery     Allergies   Allergen Reactions    Oxycontin [Oxycodone Hcl] Dizziness    Amoxicillin Rash    Cefprozil Rash    Cephalexin Rash    Ciprofloxacin Rash    Doxycycline Rash    Erythromycin Rash    Levofloxacin Rash    Penicillins Rash     Immunization History   Administered Date(s) Administered    COVID-19 J&J (Pollen) vaccine 0.5 mL 03/26/2021    COVID-19 MODERNA VACC 0.5 ML IM 11/20/2021    Pneumococcal Conjugate 13-Valent 04/02/2015    Pneumococcal Conjugate Vaccine 20-valent (Pcv20), Polysace 02/13/2023    Pneumococcal Polysaccharide PPV23 09/16/2011, 10/21/2016    Tdap 11/17/2015    Zoster 06/13/2015       Objective     /64   Pulse 98   Temp (!) 97.3 °F (36.3 °C)   Ht 5' 4.5" (1.638 m)   Wt 67.1 kg (148 lb)   SpO2 100%   BMI 25.01 kg/m²     Physical Exam  Constitutional:       Appearance: Normal appearance. She is well-developed. HENT:      Head: Normocephalic and atraumatic. Right Ear: Tympanic membrane, ear canal and external ear normal.      Left Ear: Tympanic membrane, ear canal and external ear normal.      Nose: Congestion present. Mouth/Throat:      Pharynx: Posterior oropharyngeal erythema present. Eyes:      Pupils: Pupils are equal, round, and reactive to light. Neck:      Thyroid: No thyromegaly. Cardiovascular:      Rate and Rhythm: Normal rate and regular rhythm. Heart sounds: Normal heart sounds. Pulmonary:      Effort: Pulmonary effort is normal.      Breath sounds: Normal breath sounds. Musculoskeletal:         General: Normal range of motion. Cervical back: Neck supple. Lymphadenopathy:      Cervical: No cervical adenopathy. Skin:     General: Skin is warm and dry. Neurological:      Mental Status: She is alert.    Psychiatric:         Mood and Affect: Mood normal.         Behavior: Behavior normal.       Brandon Candelaria MD

## 2023-12-15 ENCOUNTER — TELEPHONE (OUTPATIENT)
Dept: FAMILY MEDICINE CLINIC | Facility: CLINIC | Age: 78
End: 2023-12-15

## 2023-12-15 NOTE — TELEPHONE ENCOUNTER
Re : knee effusion - pt requested an appt. Pt scheduled an appt with  for 12/18/2023 for her R knee pain - all questions and concerns were answered/addressed. Advised to contact me or the office with any concerns or questions. In the event of an emergency, and unable to contact a provider they are to go to the emergency room.

## 2023-12-16 ENCOUNTER — APPOINTMENT (EMERGENCY)
Dept: RADIOLOGY | Facility: HOSPITAL | Age: 78
End: 2023-12-16
Payer: MEDICARE

## 2023-12-16 ENCOUNTER — HOSPITAL ENCOUNTER (EMERGENCY)
Facility: HOSPITAL | Age: 78
Discharge: HOME | End: 2023-12-16
Attending: EMERGENCY MEDICINE
Payer: MEDICARE

## 2023-12-16 VITALS
TEMPERATURE: 98.2 F | BODY MASS INDEX: 24.34 KG/M2 | SYSTOLIC BLOOD PRESSURE: 165 MMHG | DIASTOLIC BLOOD PRESSURE: 92 MMHG | RESPIRATION RATE: 18 BRPM | HEART RATE: 95 BPM | OXYGEN SATURATION: 96 % | WEIGHT: 144 LBS

## 2023-12-16 DIAGNOSIS — M25.561 RIGHT KNEE PAIN: Primary | ICD-10-CM

## 2023-12-16 LAB
APPEARANCE FLD: ABNORMAL
COLOR FLD: ABNORMAL
EOSINOPHIL NFR SNV MANUAL: 2 %
HISTIOCYTES NFR SNV MANUAL: 16 %
LYMPHOCYTES # SNV MANUAL: 9 %
MONOCYTES NFR SNV MANUAL: 1 %
NEUTROPHILS NFR SNV MANUAL: 72 %
RBC # SNV MANUAL: 4000 /UL (ref 0–10)
SITE: ABNORMAL
TOTAL CELLS COUNTED SPEC: 100
WBC # FLD MANUAL: 45 /UL (ref 0–200)

## 2023-12-16 PROCEDURE — 99283 EMERGENCY DEPT VISIT LOW MDM: CPT

## 2023-12-16 PROCEDURE — 89051 BODY FLUID CELL COUNT: CPT | Performed by: EMERGENCY MEDICINE

## 2023-12-16 PROCEDURE — 73564 X-RAY EXAM KNEE 4 OR MORE: CPT

## 2023-12-16 PROCEDURE — 87205 SMEAR GRAM STAIN: CPT | Performed by: EMERGENCY MEDICINE

## 2023-12-16 PROCEDURE — 99284 EMERGENCY DEPT VISIT MOD MDM: CPT | Performed by: EMERGENCY MEDICINE

## 2023-12-16 PROCEDURE — 89050 BODY FLUID CELL COUNT: CPT | Performed by: EMERGENCY MEDICINE

## 2023-12-16 PROCEDURE — 20610 DRAIN/INJ JOINT/BURSA W/O US: CPT | Performed by: EMERGENCY MEDICINE

## 2023-12-16 PROCEDURE — 89060 EXAM SYNOVIAL FLUID CRYSTALS: CPT | Performed by: EMERGENCY MEDICINE

## 2023-12-16 PROCEDURE — 87070 CULTURE OTHR SPECIMN AEROBIC: CPT | Performed by: EMERGENCY MEDICINE

## 2023-12-16 RX ORDER — PREDNISONE 20 MG/1
40 TABLET ORAL DAILY
Qty: 10 TABLET | Refills: 0 | Status: SHIPPED | OUTPATIENT
Start: 2023-12-17 | End: 2023-12-27

## 2023-12-16 RX ORDER — PREDNISONE 20 MG/1
40 TABLET ORAL ONCE
Status: COMPLETED | OUTPATIENT
Start: 2023-12-16 | End: 2023-12-16

## 2023-12-16 RX ADMIN — PREDNISONE 40 MG: 20 TABLET ORAL at 16:57

## 2023-12-17 LAB — CRYSTALS SNV QL MICRO: NORMAL

## 2023-12-17 NOTE — ED PROVIDER NOTES
History  Chief Complaint   Patient presents with    Knee Pain     Pt c/o right knee pain, pt states she needs it drained      Several days of progressively worsening R knee pain and swelling with associated difficulty ambulating. Similar sxs in past, unclear etiology, has been drained by pcp several times. No fever or systemic illness. No trauma to knee. Not on thinners.         Prior to Admission Medications   Prescriptions Last Dose Informant Patient Reported? Taking?   APPLE CIDER VINEGAR PO   Yes No   Sig: apple cider vinegar   CVS TURMERIC CURCUMIN PO   Yes No   Sig: Curcumin   Cholecalciferol (Vitamin D3) 1.25 MG (98777 UT) CAPS   Yes No   Sig: Vitamin D3   EPINEPHrine (EpiPen 2-Ever) 0.3 mg/0.3 mL SOAJ   No No   Sig: Inject 0.3 mL (0.3 mg total) into a muscle once for 1 dose   KRILL OIL PO   Yes No   Sig: krill oil   Multiple Vitamin (RA ONE DAILY MULTI-VITAMIN PO)   Yes No   Sig: Daily Multi-Vitamin   NP Thyroid 90 MG tablet   No No   Sig: Take 1 tablet by mouth once daily   Nexletol 180 MG TABS   Yes No   Sig: Take 1 tablet by mouth every morning   Probiotic Product (PROBIOTIC BLEND PO)   Yes No   Sig: Probiotic   Restasis 0.05 % ophthalmic emulsion   Yes No   Sig: INSTILL 1 DROP INTO EACH EYE TWICE DAILY   SUPER B COMPLEX/C PO   Yes No   Sig: Super B Complex   Trelegy Ellipta 100-62.5-25 MCG/INH inhaler   Yes No   Sig: Inhale 1 puff daily   allopurinol (ZYLOPRIM) 100 mg tablet   Yes No   Sig: Take 100 mg by mouth every morning   cetirizine (ZyrTEC) 10 mg tablet   Yes No   Sig: Take 10 mg by mouth daily   hydrochlorothiazide (HYDRODIURIL) 12.5 mg tablet   No No   Sig: Take 1 tablet (12.5 mg total) by mouth daily   ibuprofen (MOTRIN) 400 mg tablet   No No   Sig: Take 1 tablet (400 mg total) by mouth 3 (three) times a day as needed for mild pain   lidocaine (Xylocaine) 1 %   Yes No   Sig: Take 3 mL by injection route.   losartan (COZAAR) 50 mg tablet   No No   Sig: Take 1 tablet (50 mg total) by mouth daily    naproxen (Naprosyn) 500 mg tablet   No No   Sig: Take 1 tablet (500 mg total) by mouth 2 (two) times a day with meals for 7 days   triamcinolone (KENALOG) 0.5 % cream   No No   Sig: Apply topically 3 (three) times a day      Facility-Administered Medications: None       Past Medical History:   Diagnosis Date    Arthritis     Asthma     Cancer ([facility])     breast    Disease of thyroid gland     Hyperlipidemia     Hypertension        Past Surgical History:   Procedure Laterality Date    BACK SURGERY      L6! Correct--L6.    BREAST BIOPSY      reconstruction    EYE SURGERY      HAND SURGERY Right     Thumb/Wrist Prosthesis    JOINT REPLACEMENT      LUMBAR LAMINECTOMY      MASTECTOMY      NECK SURGERY      C4,5,6    VA ARTHROPLASTY GLENOHUMERAL JOINT TOTAL SHOULDER Right 2023    Procedure: Reverse total shoulder replacement;  Surgeon: Tu Bull MD;  Location: [facility];  Service: Orthopedics       Family History   Problem Relation Age of Onset    Cancer Mother     Diabetes Mother     Heart disease Mother     Cancer Father      I have reviewed and agree with the history as documented.    E-Cigarette/Vaping    E-Cigarette Use Never User      E-Cigarette/Vaping Substances    Nicotine No     THC No     CBD No     Flavoring No     Other No     Unknown No      Social History     Tobacco Use    Smoking status: Former     Current packs/day: 0.00     Average packs/day: 2.0 packs/day for 30.0 years (60.0 ttl pk-yrs)     Types: Cigarettes     Start date: 1956     Quit date: 1986     Years since quittin.1    Smokeless tobacco: Never   Vaping Use    Vaping status: Never Used   Substance Use Topics    Alcohol use: Yes     Alcohol/week: 14.0 standard drinks of alcohol     Types: 14 Glasses of wine per week     Comment: 1-2 daily    Drug use: Yes     Types: Marijuana     Comment: medical for sleep       Review of Systems   Musculoskeletal:  Positive for arthralgias, gait problem and joint swelling.        Physical Exam  Physical Exam  Vitals and nursing note reviewed.   Constitutional:       General: She is not in acute distress.     Appearance: She is well-developed. She is not diaphoretic.   HENT:      Head: Normocephalic and atraumatic.   Eyes:      General:         Right eye: No discharge.         Left eye: No discharge.      Conjunctiva/sclera: Conjunctivae normal.      Pupils: Pupils are equal, round, and reactive to light.   Neck:      Vascular: No JVD.   Pulmonary:      Breath sounds: No stridor.   Musculoskeletal:         General: Swelling present. No tenderness, deformity or signs of injury. Normal range of motion.      Cervical back: Normal range of motion and neck supple.      Comments: R knee effusion. Prepatellar swelling and tenderness. No erythema. Painless passive ROM.    Skin:     General: Skin is warm and dry.      Capillary Refill: Capillary refill takes less than 2 seconds.   Neurological:      Mental Status: She is alert and oriented to person, place, and time.      Cranial Nerves: No cranial nerve deficit.      Sensory: No sensory deficit.      Motor: No abnormal muscle tone.      Coordination: Coordination normal.         Vital Signs  ED Triage Vitals [12/16/23 1556]   Temperature Pulse Respirations Blood Pressure SpO2   98.2 °F (36.8 °C) 95 18 165/92 96 %      Temp Source Heart Rate Source Patient Position - Orthostatic VS BP Location FiO2 (%)   Oral Monitor Sitting Left arm --      Pain Score       --           Vitals:    12/16/23 1556   BP: 165/92   Pulse: 95   Patient Position - Orthostatic VS: Sitting         Visual Acuity      ED Medications  Medications   predniSONE tablet 40 mg (40 mg Oral Given 12/16/23 1657)       Diagnostic Studies  Results Reviewed       Procedure Component Value Units Date/Time    Body fluid culture and Gram stain [[id number]] Collected: 12/16/23 1650    Lab Status: Preliminary result Specimen: Body Fluid from Joint, Right Knee Updated: 12/17/23 0825     Gram  Stain Result No Polys or Bacteria seen    Synovial fluid, crystal [[id number]] Collected: 12/16/23 1650    Lab Status: Final result Specimen: Synovial Fluid from Joint, Right Knee Updated: 12/17/23 0047     Crystals, Synovial Fluid No Crystals Seen    Synovial Fluid Diff [[id number]] Collected: 12/16/23 1650    Lab Status: Final result Specimen: Synovial Fluid from Joint, Right Knee Updated: 12/16/23 2105     Total Counted 100     Neutrophil % Synovial 72 %      Lymph % Synovial 9 %      Monocyte % Synovial 1 %      Eosinophil % Synovial 2 %      Histiocyte % Synovial 16 %     RBC count,Synovial Fluid [[id number]]  (Abnormal) Collected: 12/16/23 1650    Lab Status: Final result Specimen: Synovial Fluid from Joint, Right Knee Updated: 12/16/23 1800     RBC, SYNOVIAL 4,000    Synovial fluid white cell count w/ diff [[id number]]  (Abnormal) Collected: 12/16/23 1650    Lab Status: Final result Specimen: Synovial Fluid from Joint, Right Knee Updated: 12/16/23 1759     Site right knee synovial     Color, Fluid Red     Clarity, Fluid Bloody     WBC, Fluid 45 /ul                    XR knee 4+ vw right injury   Final Result by Alexis Oakley MD (12/17 0858)   Degenerative change and prepatellar bursitis. No fracture.      Workstation performed: [id number]                    Procedures  Orthopedic injury treatment    Date/Time: 12/16/2023 5:03 PM    Performed by: Juve Ruiz MD  Authorized by: Juve Ruiz MD    Patient Location:  ED  [facility] Protocol:  Consent: Verbal consent obtained.  Risks and benefits: risks, benefits and alternatives were discussed  Consent given by: patient    Injury location:  Knee  Injury type: arthrocentesis.  Neurovascular status: Neurovascularly intact    Distal perfusion: normal    Neurological function: normal    Range of motion: normal    Local anesthesia used?: Yes    Anesthesia:  Local infiltration  Local anesthetic:  Lidocaine 2% with epinephrine  Patient tolerance:  Patient tolerated the  procedure well with no immediate complications   Under sterile technique an 18 gauge needle was advanced into the knee joint, 10cc bloody fluid was aspirated.            ED Course  ED Course as of 12/17/23 0929   Sat Dec 16, 2023   1647 Pt does not wish to stay for results of arthrocentesis, she does not feel that she has SA. I will call her tomorrow with results when available.                                SBIRT 22yo+      Flowsheet Row Most Recent Value   Initial Alcohol Screen: US AUDIT-C     1. How often do you have a drink containing alcohol? 0 Filed at: 12/16/2023 1556   2. How many drinks containing alcohol do you have on a typical day you are drinking?  0 Filed at: 12/16/2023 1556   3b. FEMALE Any Age, or MALE 65+: How often do you have 4 or more drinks on one occassion? 0 Filed at: 12/16/2023 1556   Audit-C Score 0 Filed at: 12/16/2023 1556   KOKO: How many times in the past year have you...    Used an illegal drug or used a prescription medication for non-medical reasons? Never Filed at: 12/16/2023 1556                      Medical Decision Making  Amount and/or Complexity of Data Reviewed  Labs: ordered.  Radiology: ordered.    Risk  Prescription drug management.             Disposition  Final diagnoses:   Right knee pain     Time reflects when diagnosis was documented in both MDM as applicable and the Disposition within this note       Time User Action Codes Description Comment    12/16/2023  4:48 PM Juve Ruiz Add [M25.561] Right knee pain           ED Disposition       ED Disposition   Discharge    Condition   Stable    Date/Time   Sat Dec 16, 2023 1648    Comment   Hope Sue discharge to home/self care.                   Follow-up Information       Follow up With Specialties Details Why Contact Info    Roosevelt Tee MD Orthopedic Surgery   [address] [facility]  [address]  [place] [address]  [phone]              Discharge Medication List as of 12/16/2023  4:49 PM        START  taking these medications    Details   predniSONE 20 mg tablet Take 2 tablets (40 mg total) by mouth daily Do not start before December 17, 2023., Starting Sun 12/17/2023, Print           CONTINUE these medications which have NOT CHANGED    Details   allopurinol (ZYLOPRIM) 100 mg tablet Take 100 mg by mouth every morning, Starting Wed 8/2/2023, Historical Med      APPLE CIDER VINEGAR PO apple cider vinegar, Historical Med      cetirizine (ZyrTEC) 10 mg tablet Take 10 mg by mouth daily, Historical Med      Cholecalciferol (Vitamin D3) 1.25 MG (66594 UT) CAPS Vitamin D3, Historical Med      [employer]S TURMERIC CURCUMIN PO Curcumin, Historical Med      EPINEPHrine (EpiPen 2-Ever) 0.3 mg/0.3 mL SOAJ Inject 0.3 mL (0.3 mg total) into a muscle once for 1 dose, Starting Thu 6/18/2020, Normal      hydrochlorothiazide (HYDRODIURIL) 12.5 mg tablet Take 1 tablet (12.5 mg total) by mouth daily, Starting Thu 1/5/2023, Normal      ibuprofen (MOTRIN) 400 mg tablet Take 1 tablet (400 mg total) by mouth 3 (three) times a day as needed for mild pain, Starting Mon 8/14/2023, Normal      KRILL OIL PO krill oil, Historical Med      lidocaine (Xylocaine) 1 % Take 3 mL by injection route., Historical Med      losartan (COZAAR) 50 mg tablet Take 1 tablet (50 mg total) by mouth daily, Starting Thu 1/5/2023, Normal      Multiple Vitamin (RA ONE DAILY MULTI-VITAMIN PO) Daily Multi-Vitamin, Historical Med      naproxen (Naprosyn) 500 mg tablet Take 1 tablet (500 mg total) by mouth 2 (two) times a day with meals for 7 days, Starting Fri 11/17/2023, Until Fri 11/24/2023, Normal      Nexletol 180 MG TABS Take 1 tablet by mouth every morning, Starting Mon 1/24/2022, Historical Med      NP Thyroid 90 MG tablet Take 1 tablet by mouth once daily, Normal      Probiotic Product (PROBIOTIC BLEND PO) Probiotic, Historical Med      Restasis 0.05 % ophthalmic emulsion INSTILL 1 DROP INTO EACH EYE TWICE DAILY, Historical Med      SUPER B COMPLEX/C PO Super B  Complex, Historical Med      Trelegy Ellipta 100-62.5-25 MCG/INH inhaler Inhale 1 puff daily, Starting Sun 8/14/2022, Historical Med      triamcinolone (KENALOG) 0.5 % cream Apply topically 3 (three) times a day, Starting Thu 2/25/2021, Normal                 PDMP Review         Value Time User    PDMP Reviewed  Yes 8/30/2021  2:41 PM Becky Mcnair PA-C            ED Provider  Electronically Signed by             Juve Ruiz MD  12/17/23 0995

## 2023-12-18 ENCOUNTER — OFFICE VISIT (OUTPATIENT)
Dept: OBGYN CLINIC | Facility: CLINIC | Age: 78
End: 2023-12-18
Payer: MEDICARE

## 2023-12-18 VITALS
HEIGHT: 65 IN | HEART RATE: 85 BPM | DIASTOLIC BLOOD PRESSURE: 88 MMHG | WEIGHT: 143 LBS | BODY MASS INDEX: 23.82 KG/M2 | SYSTOLIC BLOOD PRESSURE: 141 MMHG

## 2023-12-18 DIAGNOSIS — M70.41 PREPATELLAR BURSITIS OF RIGHT KNEE: Primary | ICD-10-CM

## 2023-12-18 DIAGNOSIS — M25.561 RIGHT KNEE PAIN: ICD-10-CM

## 2023-12-18 PROCEDURE — 99204 OFFICE O/P NEW MOD 45 MIN: CPT | Performed by: ORTHOPAEDIC SURGERY

## 2023-12-18 PROCEDURE — 20610 DRAIN/INJ JOINT/BURSA W/O US: CPT | Performed by: ORTHOPAEDIC SURGERY

## 2023-12-18 RX ORDER — LIDOCAINE HYDROCHLORIDE 10 MG/ML
0.5 INJECTION, SOLUTION INFILTRATION; PERINEURAL
Status: COMPLETED | OUTPATIENT
Start: 2023-12-18 | End: 2023-12-18

## 2023-12-18 RX ORDER — METHYLPREDNISOLONE ACETATE 40 MG/ML
20 INJECTION, SUSPENSION INTRA-ARTICULAR; INTRALESIONAL; INTRAMUSCULAR; SOFT TISSUE
Status: COMPLETED | OUTPATIENT
Start: 2023-12-18 | End: 2023-12-18

## 2023-12-18 RX ADMIN — METHYLPREDNISOLONE ACETATE 20 MG: 40 INJECTION, SUSPENSION INTRA-ARTICULAR; INTRALESIONAL; INTRAMUSCULAR; SOFT TISSUE at 15:30

## 2023-12-18 RX ADMIN — LIDOCAINE HYDROCHLORIDE 0.5 ML: 10 INJECTION, SOLUTION INFILTRATION; PERINEURAL at 15:30

## 2023-12-18 NOTE — PROGRESS NOTES
Patient Name:  Hope Sue  MRN:  [id number]    Assessment & Plan     1. Prepatellar bursitis of right knee  -     Large joint arthrocentesis: R prepatellar bursa    2. Right knee pain  -     Ambulatory Referral to Orthopedic Surgery      Right knee prepatellar bursitis  X-rays reviewed in office today with patient  Discussed nonoperative management with OTC topical and oral analgesics, compression, aspiration and CSI. Patient wished to move forward with repeat aspiration and CSI.  Risks and benefits were discussed in detail.  Approximately 15c of blood tinged serosanguinous fluid aspirated and CSI provided. Tolerated procedure well. Applied ace wrap for compression and advised patient to maintain compression for about 1 week.  If patients symptoms return, could consider repeat aspiration, no sooner than 3 months and would unlikely provide CSI at that time.   If patient's swelling persists despite nonoperative management, briefly discussed I&D of prepatellar bursa. Would no recommend at this time and patient agreeable.  Continue OTC medication as needed for pain relief  Follow up as needed.       Chief Complaint     Right knee pain    History of the Present Illness     Hope Sue is a 78 y.o. female with Right knee pain since last week without known injury. Patient admits to swelling occurring in the past with previous aspirations. She went to the ED yesterday and 10cc of fluid was aspirated. Today, patient reports she is feeling a little better since yesterday, but still has swelling noted. She was provided prednisone upon discharge. She denies fevers, chills or signs of infection.     Review of Systems     Review of Systems   Constitutional:  Negative for chills and fever.   HENT:  Negative for congestion.    Respiratory:  Negative for cough, chest tightness and shortness of breath.    Cardiovascular:  Negative for chest pain and palpitations.   Gastrointestinal:  Negative for abdominal pain.   Endocrine:  "Negative for cold intolerance and heat intolerance.   Neurological:  Negative for syncope.   Psychiatric/Behavioral:  Negative for confusion.        Physical Exam     /88   Pulse 85   Ht 5' 4.5\" (1.638 m)   Wt 64.9 kg (143 lb)   BMI 24.17 kg/m²     RightKnee  Obvious swelling at prepatellar bursa, fluctuance appreciated  Range of motion from 0 to 130 degrees.    There is no crepitus with range of motion.   There is no effusion.    There is no tenderness over the knee.    There is 5/5 quadriceps strength and preserved tone.    The patient is able to perform a straight leg raise.      negative patellar grind test.  Anterior drawer tests is negative  negative Lachman Test.   Posterior drawer test is   negative   Varus stress testing reveals no pain or instability at 0 and 30 degrees   Valgus stress testing reveals no pain or instability at 0 and 30 degrees  The patient is neurovascular intact distally.      Eyes:  Anicteric sclerae.  Neck:  Supple.  Lungs:  Normal respiratory effort.  Cardiovascular:  Capillary refill is less than 2 seconds.  Skin:  Intact without erythema.  Neurologic:  Sensation grossly intact to light touch.  Psychiatric:  Mood and affect are appropriate.    Data Review     I have personally reviewed pertinent films in PACS, and my interpretation follows:    X-rays taken 12/17/2023 of Right knee demonstrate mild tricompartmental osteoarthritis, chondrocalcinosis, obvious anterior knee swelling. No acute fracture or dislocation noted.     Past Medical History:   Diagnosis Date    Arthritis     Asthma     Cancer (HCC)     breast    Disease of thyroid gland     Hyperlipidemia     Hypertension        Past Surgical History:   Procedure Laterality Date    BACK SURGERY      L6! Correct--L6.    BREAST BIOPSY      reconstruction    EYE SURGERY      HAND SURGERY Right     Thumb/Wrist Prosthesis    JOINT REPLACEMENT      LUMBAR LAMINECTOMY      MASTECTOMY      NECK SURGERY      C4,5,6    MI " ARTHROPLASTY GLENOHUMERAL JOINT TOTAL SHOULDER Right 04/21/2023    Procedure: Reverse total shoulder replacement;  Surgeon: Tu Bull MD;  Location: [facility];  Service: Orthopedics       Allergies   Allergen Reactions    Oxycontin [Oxycodone Hcl] Dizziness    Amoxicillin Rash    Cefprozil Rash    Cephalexin Rash    Ciprofloxacin Rash    Doxycycline Rash    Erythromycin Rash    Levofloxacin Rash    Penicillins Rash       Current Outpatient Medications on File Prior to Visit   Medication Sig Dispense Refill    allopurinol (ZYLOPRIM) 100 mg tablet Take 100 mg by mouth every morning      APPLE CIDER VINEGAR PO apple cider vinegar      cetirizine (ZyrTEC) 10 mg tablet Take 10 mg by mouth daily      Cholecalciferol (Vitamin D3) 1.25 MG (11415 UT) CAPS Vitamin D3      [employer] TURMERIC CURCUMIN PO Curcumin      hydrochlorothiazide (HYDRODIURIL) 12.5 mg tablet Take 1 tablet (12.5 mg total) by mouth daily 90 tablet 5    ibuprofen (MOTRIN) 400 mg tablet Take 1 tablet (400 mg total) by mouth 3 (three) times a day as needed for mild pain 90 tablet 5    KRILL OIL PO krill oil      lidocaine (Xylocaine) 1 % Take 3 mL by injection route.      losartan (COZAAR) 50 mg tablet Take 1 tablet (50 mg total) by mouth daily 90 tablet 5    Multiple Vitamin (RA ONE DAILY MULTI-VITAMIN PO) Daily Multi-Vitamin      Nexletol 180 MG TABS Take 1 tablet by mouth every morning      NP Thyroid 90 MG tablet Take 1 tablet by mouth once daily 90 tablet 0    predniSONE 20 mg tablet Take 2 tablets (40 mg total) by mouth daily Do not start before December 17, 2023. 10 tablet 0    Probiotic Product (PROBIOTIC BLEND PO) Probiotic      Restasis 0.05 % ophthalmic emulsion INSTILL 1 DROP INTO EACH EYE TWICE DAILY      SUPER B COMPLEX/C PO Super B Complex      Trelegy Ellipta 100-62.5-25 MCG/INH inhaler Inhale 1 puff daily      triamcinolone (KENALOG) 0.5 % cream Apply topically 3 (three) times a day 30 g 5    EPINEPHrine (EpiPen 2-Ever) 0.3 mg/0.3 mL SOAJ  Inject 0.3 mL (0.3 mg total) into a muscle once for 1 dose 0.6 mL 1    naproxen (Naprosyn) 500 mg tablet Take 1 tablet (500 mg total) by mouth 2 (two) times a day with meals for 7 days 14 tablet 0     No current facility-administered medications on file prior to visit.       Social History     Tobacco Use    Smoking status: Former     Current packs/day: 0.00     Average packs/day: 2.0 packs/day for 30.0 years (60.0 ttl pk-yrs)     Types: Cigarettes     Start date: 1956     Quit date: 1986     Years since quittin.1    Smokeless tobacco: Never   Vaping Use    Vaping status: Never Used   Substance Use Topics    Alcohol use: Yes     Alcohol/week: 14.0 standard drinks of alcohol     Types: 14 Glasses of wine per week     Comment: 1-2 daily    Drug use: Yes     Types: Marijuana     Comment: medical for sleep       Family History   Problem Relation Age of Onset    Cancer Mother     Diabetes Mother     Heart disease Mother     Cancer Father              Procedures Performed     Large joint arthrocentesis: R prepatellar bursa  Universal Protocol:  Risks and benefits: risks, benefits and alternatives were discussed  Consent given by: patient  Patient identity confirmed: verbally with patient  Procedure Details  Location: knee - R prepatellar bursa  Needle size: 18 G  Approach: anterior  Medications administered: 0.5 mL lidocaine 1 %; 20 mg methylPREDNISolone acetate 40 mg/mL (0.5 methylprednisolone was provided infiltration)    Patient tolerance: patient tolerated the procedure well with no immediate complications  Dressing:  Sterile dressing applied              Solange Vann PA-C

## 2023-12-20 LAB
BACTERIA SPEC BFLD CULT: NO GROWTH
GRAM STN SPEC: NORMAL

## 2023-12-27 ENCOUNTER — OFFICE VISIT (OUTPATIENT)
Age: 78
End: 2023-12-27
Payer: MEDICARE

## 2023-12-27 VITALS
HEART RATE: 87 BPM | WEIGHT: 150 LBS | OXYGEN SATURATION: 100 % | TEMPERATURE: 98.5 F | BODY MASS INDEX: 25.35 KG/M2 | SYSTOLIC BLOOD PRESSURE: 119 MMHG | RESPIRATION RATE: 20 BRPM | DIASTOLIC BLOOD PRESSURE: 70 MMHG

## 2023-12-27 DIAGNOSIS — J20.9 ACUTE BRONCHITIS, UNSPECIFIED ORGANISM: Primary | ICD-10-CM

## 2023-12-27 PROCEDURE — 99213 OFFICE O/P EST LOW 20 MIN: CPT | Performed by: PHYSICIAN ASSISTANT

## 2023-12-27 PROCEDURE — G0463 HOSPITAL OUTPT CLINIC VISIT: HCPCS | Performed by: PHYSICIAN ASSISTANT

## 2023-12-27 RX ORDER — PREDNISONE 20 MG/1
TABLET ORAL
Qty: 4 TABLET | Refills: 0 | Status: SHIPPED | OUTPATIENT
Start: 2023-12-27

## 2023-12-27 RX ORDER — ALBUTEROL SULFATE 90 UG/1
2 AEROSOL, METERED RESPIRATORY (INHALATION) 3 TIMES DAILY PRN
Qty: 6.7 G | Refills: 0 | Status: SHIPPED | OUTPATIENT
Start: 2023-12-27

## 2023-12-27 NOTE — PROGRESS NOTES
[facility]        NAME: Hope Sue is a 78 y.o. female  : 1945    MRN: [id number]  DATE: 2023  TIME: 11:38 AM    Assessment and Plan   Acute bronchitis, unspecified organism [J20.9]  1. Acute bronchitis, unspecified organism  albuterol (Proventil HFA) 90 mcg/act inhaler    predniSONE 20 mg tablet            Patient Instructions       Follow up with PCP in 3-5 days.  Proceed to  ER if symptoms worsen.    Chief Complaint     Chief Complaint   Patient presents with    Cough     Patient states she stared with a cough 2 days ago. Patient states she has a headache as well along with congestion.          History of Present Illness       Home COVID test was negative, as per patient.    Cough  This is a new problem. The current episode started in the past 7 days. The problem has been gradually worsening. The problem occurs every few hours. The cough is Non-productive. Associated symptoms include nasal congestion, postnasal drip, rhinorrhea and a sore throat. Pertinent negatives include no chest pain, chills, ear congestion, ear pain, fever, heartburn, hemoptysis, myalgias, rash, shortness of breath or wheezing. The symptoms are aggravated by lying down and cold air. She has tried nothing for the symptoms. There is no history of asthma or COPD.       Review of Systems   Review of Systems   Constitutional:  Negative for activity change, appetite change, chills and fever.   HENT:  Positive for postnasal drip, rhinorrhea and sore throat. Negative for ear pain.    Respiratory:  Positive for cough. Negative for hemoptysis, shortness of breath and wheezing.    Cardiovascular:  Negative for chest pain.   Gastrointestinal:  Negative for abdominal pain, diarrhea, heartburn, nausea and vomiting.   Musculoskeletal:  Negative for myalgias.   Skin:  Negative for rash.         Current Medications       Current Outpatient Medications:     albuterol (Proventil HFA) 90 mcg/act inhaler, Inhale 2 puffs 3  (three) times a day as needed for wheezing or shortness of breath, Disp: 6.7 g, Rfl: 0    predniSONE 20 mg tablet, 1 tab by mouth daily for 4 days, Disp: 4 tablet, Rfl: 0    allopurinol (ZYLOPRIM) 100 mg tablet, Take 100 mg by mouth every morning, Disp: , Rfl:     APPLE CIDER VINEGAR PO, apple cider vinegar, Disp: , Rfl:     cetirizine (ZyrTEC) 10 mg tablet, Take 10 mg by mouth daily, Disp: , Rfl:     Cholecalciferol (Vitamin D3) 1.25 MG (95212 UT) CAPS, Vitamin D3, Disp: , Rfl:     [employer]S TURMERIC CURCUMIN PO, Curcumin, Disp: , Rfl:     EPINEPHrine (EpiPen 2-Ever) 0.3 mg/0.3 mL SOAJ, Inject 0.3 mL (0.3 mg total) into a muscle once for 1 dose, Disp: 0.6 mL, Rfl: 1    hydrochlorothiazide (HYDRODIURIL) 12.5 mg tablet, Take 1 tablet (12.5 mg total) by mouth daily, Disp: 90 tablet, Rfl: 5    ibuprofen (MOTRIN) 400 mg tablet, Take 1 tablet (400 mg total) by mouth 3 (three) times a day as needed for mild pain, Disp: 90 tablet, Rfl: 5    KRILL OIL PO, krill oil, Disp: , Rfl:     lidocaine (Xylocaine) 1 %, Take 3 mL by injection route., Disp: , Rfl:     losartan (COZAAR) 50 mg tablet, Take 1 tablet (50 mg total) by mouth daily, Disp: 90 tablet, Rfl: 5    Multiple Vitamin (RA ONE DAILY MULTI-VITAMIN PO), Daily Multi-Vitamin, Disp: , Rfl:     naproxen (Naprosyn) 500 mg tablet, Take 1 tablet (500 mg total) by mouth 2 (two) times a day with meals for 7 days, Disp: 14 tablet, Rfl: 0    Nexletol 180 MG TABS, Take 1 tablet by mouth every morning, Disp: , Rfl:     NP Thyroid 90 MG tablet, Take 1 tablet by mouth once daily, Disp: 90 tablet, Rfl: 0    Probiotic Product (PROBIOTIC BLEND PO), Probiotic, Disp: , Rfl:     Restasis 0.05 % ophthalmic emulsion, INSTILL 1 DROP INTO EACH EYE TWICE DAILY, Disp: , Rfl:     SUPER B COMPLEX/C PO, Super B Complex, Disp: , Rfl:     Trelegy Ellipta 100-62.5-25 MCG/INH inhaler, Inhale 1 puff daily, Disp: , Rfl:     triamcinolone (KENALOG) 0.5 % cream, Apply topically 3 (three) times a day, Disp: 30 g,  Rfl: 5    Current Allergies     Allergies as of 12/27/2023 - Reviewed 12/27/2023   Allergen Reaction Noted    Oxycontin [oxycodone hcl] Dizziness 04/03/2023    Amoxicillin Rash 12/09/2014    Cefprozil Rash 12/09/2014    Cephalexin Rash 12/09/2014    Ciprofloxacin Rash 06/15/2016    Doxycycline Rash 06/20/2017    Erythromycin Rash 01/15/2015    Levofloxacin Rash 02/08/2016    Penicillins Rash 06/15/2016            The following portions of the patient's history were reviewed and updated as appropriate: allergies, current medications, past family history, past medical history, past social history, past surgical history and problem list.     Past Medical History:   Diagnosis Date    Arthritis     Asthma     Cancer (HCC)     breast    Disease of thyroid gland     Hyperlipidemia     Hypertension        Past Surgical History:   Procedure Laterality Date    BACK SURGERY      L6! Correct--L6.    BREAST BIOPSY      reconstruction    EYE SURGERY      HAND SURGERY Right     Thumb/Wrist Prosthesis    JOINT REPLACEMENT      LUMBAR LAMINECTOMY      MASTECTOMY      NECK SURGERY      C4,5,6    CO ARTHROPLASTY GLENOHUMERAL JOINT TOTAL SHOULDER Right 04/21/2023    Procedure: Reverse total shoulder replacement;  Surgeon: Tu Bull MD;  Location: [facility];  Service: Orthopedics       Family History   Problem Relation Age of Onset    Cancer Mother     Diabetes Mother     Heart disease Mother     Cancer Father          Medications have been verified.        Objective   /70   Pulse 87   Temp 98.5 °F (36.9 °C)   Resp 20   Wt 68 kg (150 lb)   SpO2 100%   BMI 25.35 kg/m²        Physical Exam     Physical Exam  Vitals and nursing note reviewed.   Constitutional:       General: She is not in acute distress.     Appearance: Normal appearance. She is well-developed. She is not ill-appearing.   HENT:      Head: Normocephalic and atraumatic.      Right Ear: Tympanic membrane, ear canal and external ear normal.      Left Ear:  Tympanic membrane, ear canal and external ear normal.      Nose: Congestion present. No rhinorrhea.      Mouth/Throat:      Mouth: Mucous membranes are moist. No oral lesions.      Pharynx: Oropharynx is clear. No oropharyngeal exudate.      Comments: Posterior throat hyperemic with clear postnasal drip.  Eyes:      General: No scleral icterus.     Extraocular Movements: Extraocular movements intact.      Right eye: Normal extraocular motion.      Left eye: Normal extraocular motion.      Conjunctiva/sclera: Conjunctivae normal.      Pupils: Pupils are equal, round, and reactive to light.   Cardiovascular:      Rate and Rhythm: Normal rate and regular rhythm.      Pulses: Normal pulses.      Heart sounds: Normal heart sounds.   Pulmonary:      Effort: Pulmonary effort is normal. No respiratory distress.      Breath sounds: Wheezing and rhonchi present. No rales.   Musculoskeletal:         General: Normal range of motion.      Cervical back: Normal range of motion and neck supple. No tenderness.   Lymphadenopathy:      Cervical: No cervical adenopathy.   Skin:     General: Skin is warm and dry.      Capillary Refill: Capillary refill takes less than 2 seconds.      Findings: No rash.   Neurological:      General: No focal deficit present.      Mental Status: She is alert and oriented to person, place, and time.      Coordination: Coordination normal.      Gait: Gait normal.   Psychiatric:         Mood and Affect: Mood normal.         Behavior: Behavior normal.

## 2023-12-29 DIAGNOSIS — R19.7 DIARRHEA, UNSPECIFIED TYPE: Primary | ICD-10-CM

## 2023-12-29 RX ORDER — DIPHENOXYLATE HYDROCHLORIDE AND ATROPINE SULFATE 2.5; .025 MG/1; MG/1
1 TABLET ORAL 4 TIMES DAILY PRN
Qty: 20 TABLET | Refills: 0 | Status: SHIPPED | OUTPATIENT
Start: 2023-12-29

## 2024-01-05 DIAGNOSIS — R05.9 COUGH, UNSPECIFIED TYPE: Primary | ICD-10-CM

## 2024-01-05 RX ORDER — BENZONATATE 200 MG/1
200 CAPSULE ORAL 3 TIMES DAILY PRN
Qty: 20 CAPSULE | Refills: 0 | Status: SHIPPED | OUTPATIENT
Start: 2024-01-05

## 2024-01-08 ENCOUNTER — TELEPHONE (OUTPATIENT)
Dept: FAMILY MEDICINE CLINIC | Facility: CLINIC | Age: 79
End: 2024-01-08

## 2024-01-08 ENCOUNTER — HOSPITAL ENCOUNTER (OUTPATIENT)
Dept: RADIOLOGY | Facility: HOSPITAL | Age: 79
Discharge: HOME | End: 2024-01-08
Payer: MEDICARE

## 2024-01-08 ENCOUNTER — OFFICE VISIT (OUTPATIENT)
Dept: FAMILY MEDICINE CLINIC | Facility: CLINIC | Age: 79
End: 2024-01-08
Payer: MEDICARE

## 2024-01-08 VITALS
BODY MASS INDEX: 24.32 KG/M2 | SYSTOLIC BLOOD PRESSURE: 128 MMHG | HEIGHT: 65 IN | WEIGHT: 146 LBS | TEMPERATURE: 97.8 F | HEART RATE: 88 BPM | DIASTOLIC BLOOD PRESSURE: 78 MMHG | OXYGEN SATURATION: 96 %

## 2024-01-08 DIAGNOSIS — J44.9 COPD, MODERATE (HCC): ICD-10-CM

## 2024-01-08 DIAGNOSIS — J40 BRONCHITIS: Primary | ICD-10-CM

## 2024-01-08 DIAGNOSIS — J40 BRONCHITIS: ICD-10-CM

## 2024-01-08 PROCEDURE — 99213 OFFICE O/P EST LOW 20 MIN: CPT | Performed by: FAMILY MEDICINE

## 2024-01-08 PROCEDURE — 71046 X-RAY EXAM CHEST 2 VIEWS: CPT

## 2024-01-08 RX ORDER — SULFAMETHOXAZOLE AND TRIMETHOPRIM 800; 160 MG/1; MG/1
1 TABLET ORAL 2 TIMES DAILY
Qty: 14 TABLET | Refills: 0 | Status: SHIPPED | OUTPATIENT
Start: 2024-01-08 | End: 2024-01-15

## 2024-01-08 RX ORDER — HYDROCODONE POLISTIREX AND CHLORPHENIRAMINE POLISTIREX 10; 8 MG/5ML; MG/5ML
5 SUSPENSION, EXTENDED RELEASE ORAL EVERY 12 HOURS PRN
Qty: 115 ML | Refills: 0 | Status: SHIPPED | OUTPATIENT
Start: 2024-01-08

## 2024-01-08 RX ORDER — HYDROCODONE POLISTIREX AND CHLORPHENIRAMINE POLISTIREX 10; 8 MG/5ML; MG/5ML
5 SUSPENSION, EXTENDED RELEASE ORAL EVERY 12 HOURS PRN
Qty: 115 ML | Refills: 0 | Status: SHIPPED | OUTPATIENT
Start: 2024-01-08 | End: 2024-01-08 | Stop reason: SDUPTHER

## 2024-01-08 NOTE — TELEPHONE ENCOUNTER
Pt aware / notified.   E-Prescribing Status: Receipt confirmed by pharmacy (1/8/2024  2:51 PM EST)

## 2024-01-08 NOTE — TELEPHONE ENCOUNTER
T/c from pt --    Called to report [facility] called and informed her they do not have tussionex.      Is requesting it be resent to [facility].

## 2024-01-08 NOTE — PROGRESS NOTES
Name: Hope Sue      : 1945      MRN: [id number]  Encounter Provider: Joe Gifford MD  Encounter Date: 2024   Encounter department: [facility] [address]    Assessment & Plan     1. Bronchitis  -     XR chest pa & lateral; Future; Expected date: 2024  -     sulfamethoxazole-trimethoprim (BACTRIM DS) 800-160 mg per tablet; Take 1 tablet by mouth 2 (two) times a day for 7 days  -     Hydrocod Star-Chlorphe Star ER (TUSSIONEX) 10-8 mg/5 mL ER suspension; Take 5 mL by mouth every 12 (twelve) hours as needed for cough Max Daily Amount: 10 mL    2. COPD, moderate (HCC)           Subjective      Patient comes in with 2-day history of cough.  She was started on Z-Ever and prednisone taper 2 days ago with no relief.      Review of Systems   Constitutional: Negative.    HENT: Negative.     Respiratory:  Positive for cough.    Gastrointestinal: Negative.        Current Outpatient Medications on File Prior to Visit   Medication Sig    albuterol (Proventil HFA) 90 mcg/act inhaler Inhale 2 puffs 3 (three) times a day as needed for wheezing or shortness of breath    allopurinol (ZYLOPRIM) 100 mg tablet Take 100 mg by mouth every morning    APPLE CIDER VINEGAR PO apple cider vinegar    benzonatate (TESSALON) 200 MG capsule Take 1 capsule (200 mg total) by mouth 3 (three) times a day as needed for cough    cetirizine (ZyrTEC) 10 mg tablet Take 10 mg by mouth daily    Cholecalciferol (Vitamin D3) 1.25 MG (50861 UT) CAPS Vitamin D3    [employer] TURMERIC CURCUMIN PO Curcumin    diphenoxylate-atropine (LOMOTIL) 2.5-0.025 mg per tablet Take 1 tablet by mouth 4 (four) times a day as needed for diarrhea    hydrochlorothiazide (HYDRODIURIL) 12.5 mg tablet Take 1 tablet (12.5 mg total) by mouth daily    ibuprofen (MOTRIN) 400 mg tablet Take 1 tablet (400 mg total) by mouth 3 (three) times a day as needed for mild pain    KRILL OIL PO krill oil    lidocaine (Xylocaine) 1 % Take 3 mL by  "injection route.    losartan (COZAAR) 50 mg tablet Take 1 tablet (50 mg total) by mouth daily    Multiple Vitamin (RA ONE DAILY MULTI-VITAMIN PO) Daily Multi-Vitamin    Nexletol 180 MG TABS Take 1 tablet by mouth every morning    NP Thyroid 90 MG tablet Take 1 tablet by mouth once daily    predniSONE 20 mg tablet 1 tab by mouth daily for 4 days    Probiotic Product (PROBIOTIC BLEND PO) Probiotic    Restasis 0.05 % ophthalmic emulsion INSTILL 1 DROP INTO EACH EYE TWICE DAILY    SUPER B COMPLEX/C PO Super B Complex    Trelegy Ellipta 100-62.5-25 MCG/INH inhaler Inhale 1 puff daily    triamcinolone (KENALOG) 0.5 % cream Apply topically 3 (three) times a day    EPINEPHrine (EpiPen 2-Ever) 0.3 mg/0.3 mL SOAJ Inject 0.3 mL (0.3 mg total) into a muscle once for 1 dose    naproxen (Naprosyn) 500 mg tablet Take 1 tablet (500 mg total) by mouth 2 (two) times a day with meals for 7 days       Objective     /78 (BP Location: Left arm, Patient Position: Sitting, Cuff Size: Standard)   Pulse 88   Temp 97.8 °F (36.6 °C) (Tympanic)   Ht 5' 4.5\" (1.638 m)   Wt 66.2 kg (146 lb)   SpO2 96%   BMI 24.67 kg/m²     Physical Exam  Constitutional:       Appearance: Normal appearance. She is well-developed.   HENT:      Head: Normocephalic and atraumatic.      Right Ear: Tympanic membrane, ear canal and external ear normal.      Left Ear: Tympanic membrane, ear canal and external ear normal.      Nose: Congestion present.      Mouth/Throat:      Pharynx: Posterior oropharyngeal erythema present.   Eyes:      Pupils: Pupils are equal, round, and reactive to light.   Neck:      Thyroid: No thyromegaly.   Cardiovascular:      Rate and Rhythm: Normal rate and regular rhythm.      Heart sounds: Normal heart sounds.   Pulmonary:      Effort: Pulmonary effort is normal.      Comments: Wheezing right base  Musculoskeletal:         General: Normal range of motion.      Cervical back: Neck supple.   Lymphadenopathy:      Cervical: No " cervical adenopathy.   Skin:     General: Skin is warm and dry.   Neurological:      Mental Status: She is alert.   Psychiatric:         Mood and Affect: Mood normal.         Behavior: Behavior normal.       Joe Gifford MD

## 2024-01-12 DIAGNOSIS — E03.9 HYPOTHYROIDISM, UNSPECIFIED TYPE: ICD-10-CM

## 2024-01-12 RX ORDER — LEVOTHYROXINE, LIOTHYRONINE 57; 13.5 UG/1; UG/1
TABLET ORAL
Qty: 90 TABLET | Refills: 0 | Status: SHIPPED | OUTPATIENT
Start: 2024-01-12

## 2024-01-23 ENCOUNTER — APPOINTMENT (OUTPATIENT)
Dept: LAB | Facility: HOSPITAL | Age: 79
End: 2024-01-23
Attending: FAMILY MEDICINE
Payer: MEDICARE

## 2024-01-23 DIAGNOSIS — E78.5 HYPERLIPIDEMIA, UNSPECIFIED HYPERLIPIDEMIA TYPE: ICD-10-CM

## 2024-01-23 DIAGNOSIS — I10 ESSENTIAL HYPERTENSION, MALIGNANT: ICD-10-CM

## 2024-01-23 DIAGNOSIS — E79.0 HYPERURICEMIA: ICD-10-CM

## 2024-01-23 DIAGNOSIS — R73.03 PREDIABETES: ICD-10-CM

## 2024-01-23 DIAGNOSIS — M79.18 DIFFUSE MYOFASCIAL PAIN SYNDROME: ICD-10-CM

## 2024-01-23 DIAGNOSIS — E03.9 HYPOTHYROIDISM, UNSPECIFIED TYPE: ICD-10-CM

## 2024-01-23 DIAGNOSIS — I25.10 ATHEROSCLEROSIS OF NATIVE CORONARY ARTERY, UNSPECIFIED WHETHER ANGINA PRESENT, UNSPECIFIED WHETHER NATIVE OR TRANSPLANTED HEART: ICD-10-CM

## 2024-01-23 LAB
ALBUMIN SERPL BCP-MCNC: 4.2 G/DL (ref 3.5–5)
ALP SERPL-CCNC: 52 U/L (ref 34–104)
ALT SERPL W P-5'-P-CCNC: 16 U/L (ref 7–52)
ANION GAP SERPL CALCULATED.3IONS-SCNC: 8 MMOL/L
AST SERPL W P-5'-P-CCNC: 22 U/L (ref 13–39)
BASOPHILS # BLD AUTO: 0.07 THOUSANDS/ÂΜL (ref 0–0.1)
BASOPHILS NFR BLD AUTO: 1 % (ref 0–1)
BILIRUB SERPL-MCNC: 0.56 MG/DL (ref 0.2–1)
BUN SERPL-MCNC: 18 MG/DL (ref 5–25)
CALCIUM SERPL-MCNC: 9.4 MG/DL (ref 8.4–10.2)
CHLORIDE SERPL-SCNC: 104 MMOL/L (ref 96–108)
CHOLEST SERPL-MCNC: 172 MG/DL
CO2 SERPL-SCNC: 27 MMOL/L (ref 21–32)
CREAT SERPL-MCNC: 0.93 MG/DL (ref 0.6–1.3)
EOSINOPHIL # BLD AUTO: 0.18 THOUSAND/ÂΜL (ref 0–0.61)
EOSINOPHIL NFR BLD AUTO: 2 % (ref 0–6)
ERYTHROCYTE [DISTWIDTH] IN BLOOD BY AUTOMATED COUNT: 15.2 % (ref 11.6–15.1)
EST. AVERAGE GLUCOSE BLD GHB EST-MCNC: 146 MG/DL
GFR SERPL CREATININE-BSD FRML MDRD: 59 ML/MIN/1.73SQ M
GLUCOSE P FAST SERPL-MCNC: 115 MG/DL (ref 65–99)
HBA1C MFR BLD: 6.7 %
HCT VFR BLD AUTO: 37.2 % (ref 34.8–46.1)
HDLC SERPL-MCNC: 69 MG/DL
HGB BLD-MCNC: 12.4 G/DL (ref 11.5–15.4)
IMM GRANULOCYTES # BLD AUTO: 0.03 THOUSAND/UL (ref 0–0.2)
IMM GRANULOCYTES NFR BLD AUTO: 0 % (ref 0–2)
LDLC SERPL CALC-MCNC: 74 MG/DL (ref 0–100)
LYMPHOCYTES # BLD AUTO: 1.75 THOUSANDS/ÂΜL (ref 0.6–4.47)
LYMPHOCYTES NFR BLD AUTO: 23 % (ref 14–44)
MCH RBC QN AUTO: 32.5 PG (ref 26.8–34.3)
MCHC RBC AUTO-ENTMCNC: 33.3 G/DL (ref 31.4–37.4)
MCV RBC AUTO: 97 FL (ref 82–98)
MONOCYTES # BLD AUTO: 0.43 THOUSAND/ÂΜL (ref 0.17–1.22)
MONOCYTES NFR BLD AUTO: 6 % (ref 4–12)
NEUTROPHILS # BLD AUTO: 5.31 THOUSANDS/ÂΜL (ref 1.85–7.62)
NEUTS SEG NFR BLD AUTO: 68 % (ref 43–75)
NONHDLC SERPL-MCNC: 103 MG/DL
NRBC BLD AUTO-RTO: 0 /100 WBCS
PLATELET # BLD AUTO: 289 THOUSANDS/UL (ref 149–390)
PMV BLD AUTO: 8.8 FL (ref 8.9–12.7)
POTASSIUM SERPL-SCNC: 3.9 MMOL/L (ref 3.5–5.3)
PROT SERPL-MCNC: 7.6 G/DL (ref 6.4–8.4)
RBC # BLD AUTO: 3.82 MILLION/UL (ref 3.81–5.12)
SODIUM SERPL-SCNC: 139 MMOL/L (ref 135–147)
TRIGL SERPL-MCNC: 143 MG/DL
TSH SERPL DL<=0.05 MIU/L-ACNC: 0.93 UIU/ML (ref 0.45–4.5)
URATE SERPL-MCNC: 6.6 MG/DL (ref 2–7.5)
WBC # BLD AUTO: 7.77 THOUSAND/UL (ref 4.31–10.16)

## 2024-01-23 PROCEDURE — 80053 COMPREHEN METABOLIC PANEL: CPT

## 2024-01-23 PROCEDURE — 83036 HEMOGLOBIN GLYCOSYLATED A1C: CPT

## 2024-01-23 PROCEDURE — 36415 COLL VENOUS BLD VENIPUNCTURE: CPT

## 2024-01-23 PROCEDURE — 84550 ASSAY OF BLOOD/URIC ACID: CPT

## 2024-01-23 PROCEDURE — 80061 LIPID PANEL: CPT

## 2024-01-23 PROCEDURE — 85025 COMPLETE CBC W/AUTO DIFF WBC: CPT

## 2024-01-23 PROCEDURE — 84443 ASSAY THYROID STIM HORMONE: CPT

## 2024-02-15 ENCOUNTER — OFFICE VISIT (OUTPATIENT)
Dept: OBGYN CLINIC | Facility: CLINIC | Age: 79
End: 2024-02-15
Payer: MEDICARE

## 2024-02-15 ENCOUNTER — OFFICE VISIT (OUTPATIENT)
Dept: FAMILY MEDICINE CLINIC | Facility: CLINIC | Age: 79
End: 2024-02-15
Payer: MEDICARE

## 2024-02-15 VITALS
DIASTOLIC BLOOD PRESSURE: 72 MMHG | SYSTOLIC BLOOD PRESSURE: 118 MMHG | BODY MASS INDEX: 24.32 KG/M2 | HEIGHT: 65 IN | HEART RATE: 78 BPM | OXYGEN SATURATION: 99 % | TEMPERATURE: 97.5 F | WEIGHT: 146 LBS

## 2024-02-15 VITALS
WEIGHT: 135 LBS | SYSTOLIC BLOOD PRESSURE: 129 MMHG | BODY MASS INDEX: 22.49 KG/M2 | HEIGHT: 65 IN | HEART RATE: 80 BPM | DIASTOLIC BLOOD PRESSURE: 82 MMHG

## 2024-02-15 DIAGNOSIS — Z00.00 MEDICARE ANNUAL WELLNESS VISIT, SUBSEQUENT: Primary | ICD-10-CM

## 2024-02-15 DIAGNOSIS — J45.909 UNCOMPLICATED ASTHMA, UNSPECIFIED ASTHMA SEVERITY, UNSPECIFIED WHETHER PERSISTENT: ICD-10-CM

## 2024-02-15 DIAGNOSIS — M70.41 PREPATELLAR BURSITIS OF RIGHT KNEE: Primary | ICD-10-CM

## 2024-02-15 DIAGNOSIS — E11.22 TYPE 2 DIABETES MELLITUS WITH STAGE 3A CHRONIC KIDNEY DISEASE, WITHOUT LONG-TERM CURRENT USE OF INSULIN (HCC): ICD-10-CM

## 2024-02-15 DIAGNOSIS — E78.5 HYPERLIPIDEMIA, UNSPECIFIED HYPERLIPIDEMIA TYPE: ICD-10-CM

## 2024-02-15 DIAGNOSIS — M10.9 GOUT, UNSPECIFIED CAUSE, UNSPECIFIED CHRONICITY, UNSPECIFIED SITE: ICD-10-CM

## 2024-02-15 DIAGNOSIS — J01.90 SUBACUTE SINUSITIS, UNSPECIFIED LOCATION: ICD-10-CM

## 2024-02-15 DIAGNOSIS — C50.919 MALIGNANT NEOPLASM OF FEMALE BREAST, UNSPECIFIED ESTROGEN RECEPTOR STATUS, UNSPECIFIED LATERALITY, UNSPECIFIED SITE OF BREAST (HCC): ICD-10-CM

## 2024-02-15 DIAGNOSIS — N18.31 TYPE 2 DIABETES MELLITUS WITH STAGE 3A CHRONIC KIDNEY DISEASE, WITHOUT LONG-TERM CURRENT USE OF INSULIN (HCC): ICD-10-CM

## 2024-02-15 DIAGNOSIS — I10 PRIMARY HYPERTENSION: ICD-10-CM

## 2024-02-15 DIAGNOSIS — E03.9 HYPOTHYROIDISM, UNSPECIFIED TYPE: ICD-10-CM

## 2024-02-15 PROBLEM — J45.30 MILD PERSISTENT ASTHMA WITHOUT COMPLICATION: Status: RESOLVED | Noted: 2018-01-09 | Resolved: 2024-02-15

## 2024-02-15 PROCEDURE — 99213 OFFICE O/P EST LOW 20 MIN: CPT | Performed by: ORTHOPAEDIC SURGERY

## 2024-02-15 PROCEDURE — G0439 PPPS, SUBSEQ VISIT: HCPCS | Performed by: FAMILY MEDICINE

## 2024-02-15 PROCEDURE — 20610 DRAIN/INJ JOINT/BURSA W/O US: CPT | Performed by: ORTHOPAEDIC SURGERY

## 2024-02-15 PROCEDURE — 99214 OFFICE O/P EST MOD 30 MIN: CPT | Performed by: FAMILY MEDICINE

## 2024-02-15 RX ORDER — METHYLPREDNISOLONE 4 MG/1
TABLET ORAL
Qty: 21 EACH | Refills: 0 | Status: SHIPPED | OUTPATIENT
Start: 2024-02-15

## 2024-02-15 RX ORDER — SULFAMETHOXAZOLE AND TRIMETHOPRIM 800; 160 MG/1; MG/1
1 TABLET ORAL 2 TIMES DAILY
Qty: 6 TABLET | Refills: 0 | Status: SHIPPED | OUTPATIENT
Start: 2024-02-15 | End: 2024-02-18

## 2024-02-15 NOTE — ASSESSMENT & PLAN NOTE
New onset diabetes mellitus.  Low-carb diet.  Advised to avoid NSAIDs due to chronic kidney disease.  Lab Results   Component Value Date    HGBA1C 6.7 (H) 01/23/2024

## 2024-02-15 NOTE — PROGRESS NOTES
Patient Name:  Hope Sue  MRN:  [id number]    Assessment & Plan     1. Prepatellar bursitis of right knee  -     Large joint arthrocentesis: R prepatellar bursa        Hope is a pleasant 78 y.o. female with right knee recurrent aseptic prepatellar bursitis.  Previous lab work from 12/16/2023 was reviewed that was negative for underlying infection.  There is no evidence of current infection as her knee does not demonstrate any erythema or point tenderness.  There is fluctuance to the anterior aspect of her knee.  I did note that we could attempt another aspiration and depending on the visual quality of the fluid this may require the aspirate to be sent out for testing. She verbalized understanding and consented for an aspiration of her right prepatellar bursa.  Approximately 8 cc of blood-tinged benign-appearing serosanguineous fluid was aspirated from her knee. She was dressed with a bandage as well as an Ace bandage for gentle compression.  She was advised to discontinue use of the knee wrap brace that she was using as this could exacerbate symptoms contributing to the anterior knee swelling.  She was advised that if she does experience redness, heat or pain to the anterior aspect of her knee she is to contact our office immediately. As this can be evidence of an underlying infection.  However, she will be on antibiotics to treat her respiratory infection with her primary care physician.  This may have played a part in resolving her redness and heat from last week.  Otherwise, she may follow-up with me on an as-needed basis.    Chief Complaint     Right knee swelling    History of the Present Illness     Hope Sue is a 78 y.o. female with Right knee swelling.  She states that she has been experiencing swelling to the anterior aspect of her knee since early December.  She was evaluated at the emergency department on November 16, 2023 and did have fluid aspirated from her knee.  Subsequent fluid was sent  "for labs that were negative for crystals.  Gram stain was also negative and there was no growth on culture from labs on aspirate from the emergency department on 12/16/2023.  She states that at her last visit her aspiration and injection did provide her with relief.  However last week she did notice redness and heat to her knee but denies pain.  She denies any fevers or chills.  However, notes that she has been experiencing a persistent cough that she is being treated for by her primary care physician with several courses of antibiotics.  She notes that she is going to obtain another round of Bactrim and a steroid pack today.  Today she denies any distal paresthesias.    Review of Systems     Review of Systems   Constitutional:  Negative for chills, fever and unexpected weight change.   HENT:  Negative for hearing loss, nosebleeds and sore throat.    Eyes:  Negative for pain, redness and visual disturbance.   Respiratory:  Negative for cough, shortness of breath and wheezing.    Cardiovascular:  Negative for chest pain, palpitations and leg swelling.   Gastrointestinal:  Negative for abdominal pain, nausea and vomiting.   Endocrine: Negative for polydipsia and polyuria.   Genitourinary:  Negative for dysuria and hematuria.   Musculoskeletal:  Negative for arthralgias and joint swelling.        See HPI   Skin:  Negative for rash and wound.   Neurological:  Negative for dizziness, numbness and headaches.   Psychiatric/Behavioral:  Negative for decreased concentration and suicidal ideas. The patient is not nervous/anxious.        Physical Exam     /82   Pulse 80   Ht 5' 4.5\" (1.638 m)   Wt 61.2 kg (135 lb)   BMI 22.81 kg/m²     Right Knee  Range of motion from 0 to 120.    There is no crepitus with range of motion.   Prepatellar swelling with fluctuance  There is no effusion.    There is no tenderness  There is 5/5 quadriceps strength and appropriate tone.    The patient is able to perform a straight leg " raise.      negative patellar grind test.  Anterior drawer tests is negative  negative Lachman Test.   Posterior drawer test is   negative   Varus stress testing reveals stability without pain at 0 and 30 degrees   Valgus stress testing reveals ability without pain at 0 and 30 degrees  The patient is neurovascular intact distally.      Eyes:  Anicteric sclerae.  Neck:  Supple.  Lungs:  Normal respiratory effort.  Cardiovascular:  Capillary refill is less than 2 seconds.  Skin:  Intact without erythema.  Neurologic:  Sensation grossly intact to light touch.  Psychiatric:  Mood and affect are appropriate.    Data Review     I have personally reviewed pertinent films in PACS, and my interpretation follows:  No imaging reviewed today    Past Medical History:   Diagnosis Date    Arthritis     Asthma     Cancer (HCC)     breast    Disease of thyroid gland     Hyperlipidemia     Hypertension        Past Surgical History:   Procedure Laterality Date    BACK SURGERY      L6! Correct--L6.    BREAST BIOPSY      reconstruction    EYE SURGERY      HAND SURGERY Right     Thumb/Wrist Prosthesis    JOINT REPLACEMENT      LUMBAR LAMINECTOMY      MASTECTOMY      NECK SURGERY      C4,5,6    NE ARTHROPLASTY GLENOHUMERAL JOINT TOTAL SHOULDER Right 04/21/2023    Procedure: Reverse total shoulder replacement;  Surgeon: Tu Bull MD;  Location: [facility] OR;  Service: Orthopedics       Allergies   Allergen Reactions    Oxycontin [Oxycodone Hcl] Dizziness    Amoxicillin Rash    Cefprozil Rash    Cephalexin Rash    Ciprofloxacin Rash    Doxycycline Rash    Erythromycin Rash    Levofloxacin Rash    Penicillins Rash       Current Outpatient Medications on File Prior to Visit   Medication Sig Dispense Refill    albuterol (Proventil HFA) 90 mcg/act inhaler Inhale 2 puffs 3 (three) times a day as needed for wheezing or shortness of breath 6.7 g 0    allopurinol (ZYLOPRIM) 100 mg tablet Take 100 mg by mouth every morning      APPLE CIDER  VINEGAR PO apple cider vinegar      cetirizine (ZyrTEC) 10 mg tablet Take 10 mg by mouth daily      Cholecalciferol (Vitamin D3) 1.25 MG (43024 UT) CAPS Vitamin D3      [employer] TURMERIC CURCUMIN PO Curcumin      diphenoxylate-atropine (LOMOTIL) 2.5-0.025 mg per tablet Take 1 tablet by mouth 4 (four) times a day as needed for diarrhea 20 tablet 0    hydrochlorothiazide (HYDRODIURIL) 12.5 mg tablet Take 1 tablet (12.5 mg total) by mouth daily 90 tablet 5    Hydrocod Star-Chlorphe Star ER (TUSSIONEX) 10-8 mg/5 mL ER suspension Take 5 mL by mouth every 12 (twelve) hours as needed for cough Max Daily Amount: 10 mL 115 mL 0    ibuprofen (MOTRIN) 400 mg tablet Take 1 tablet (400 mg total) by mouth 3 (three) times a day as needed for mild pain 90 tablet 5    KRILL OIL PO krill oil      lidocaine (Xylocaine) 1 % Take 3 mL by injection route.      losartan (COZAAR) 50 mg tablet Take 1 tablet (50 mg total) by mouth daily 90 tablet 5    methylPREDNISolone 4 MG tablet therapy pack Use as directed on package 21 each 0    Multiple Vitamin (RA ONE DAILY MULTI-VITAMIN PO) Daily Multi-Vitamin      Nexletol 180 MG TABS Take 1 tablet by mouth every morning      NP Thyroid 90 MG tablet Take 1 tablet by mouth once daily 90 tablet 0    Probiotic Product (PROBIOTIC BLEND PO) Probiotic      Restasis 0.05 % ophthalmic emulsion INSTILL 1 DROP INTO EACH EYE TWICE DAILY      sulfamethoxazole-trimethoprim (BACTRIM DS) 800-160 mg per tablet Take 1 tablet by mouth 2 (two) times a day for 3 days 6 tablet 0    SUPER B COMPLEX/C PO Super B Complex      Trelegy Ellipta 100-62.5-25 MCG/INH inhaler Inhale 1 puff daily      triamcinolone (KENALOG) 0.5 % cream Apply topically 3 (three) times a day 30 g 5    benzonatate (TESSALON) 200 MG capsule Take 1 capsule (200 mg total) by mouth 3 (three) times a day as needed for cough (Patient not taking: Reported on 2/15/2024) 20 capsule 0    EPINEPHrine (EpiPen 2-Ever) 0.3 mg/0.3 mL SOAJ Inject 0.3 mL (0.3 mg total)  into a muscle once for 1 dose 0.6 mL 1    [DISCONTINUED] naproxen (Naprosyn) 500 mg tablet Take 1 tablet (500 mg total) by mouth 2 (two) times a day with meals for 7 days 14 tablet 0    [DISCONTINUED] predniSONE 20 mg tablet 1 tab by mouth daily for 4 days 4 tablet 0     No current facility-administered medications on file prior to visit.       Social History     Tobacco Use    Smoking status: Former     Current packs/day: 0.00     Average packs/day: 2.0 packs/day for 30.0 years (60.0 ttl pk-yrs)     Types: Cigarettes     Start date: 1956     Quit date: 1986     Years since quittin.2    Smokeless tobacco: Never   Vaping Use    Vaping status: Never Used   Substance Use Topics    Alcohol use: Yes     Alcohol/week: 14.0 standard drinks of alcohol     Types: 14 Glasses of wine per week     Comment: 1-2 daily    Drug use: Yes     Types: Marijuana     Comment: medical for sleep       Family History   Problem Relation Age of Onset    Cancer Mother     Diabetes Mother     Heart disease Mother     Cancer Father              Procedures Performed     Large joint arthrocentesis: R prepatellar bursa  Universal Protocol:  Consent: Verbal consent obtained. Written consent not obtained.  Risks and benefits: risks, benefits and alternatives were discussed  Consent given by: patient  Timeout called at: 2/15/2024 4:24 PM.  Patient understanding: patient states understanding of the procedure being performed  Patient identity confirmed: verbally with patient  Supporting Documentation  Indications: joint swelling   Procedure Details  Location: knee - R prepatellar bursa  Preparation: Patient was prepped and draped in the usual sterile fashion  Needle size: 18 G  Ultrasound guidance: no  Approach: anterior    Aspirate amount: 8 mL  Aspirate: blood-tinged and serous  Patient tolerance: patient tolerated the procedure well with no immediate complications  Dressing:  Sterile dressing applied              Scribe Attestation       I,:  Kristopher Patel am acting as a scribe while in the presence of the attending physician.:       I,:  Nilton Moreira, DO personally performed the services described in this documentation    as scribed in my presence.:

## 2024-02-15 NOTE — PROGRESS NOTES
Assessment and Plan:   Return visit in 4 months with fasting blood work prior to visit.  Problem List Items Addressed This Visit       Asthma    Breast cancer (HCC)    Hypertension     Continue losartan 50 mg and hydrochlorothiazide 12.5 mg daily         Gout     Continue allopurinol 100 mg daily         Hyperlipidemia     Continue Nexletol 180 mg daily         Relevant Orders    Comprehensive metabolic panel    CBC and differential    Lipid Panel with Direct LDL reflex    Hypothyroidism     Continue thyroid 90 mg daily         Relevant Medications    methylPREDNISolone 4 MG tablet therapy pack    Other Relevant Orders    TSH, 3rd generation with Free T4 reflex    Type 2 diabetes mellitus with stage 3a chronic kidney disease, without long-term current use of insulin (HCC)     New onset diabetes mellitus.  Low-carb diet.  Advised to avoid NSAIDs due to chronic kidney disease.  Lab Results   Component Value Date    HGBA1C 6.7 (H) 01/23/2024            Relevant Orders    Hemoglobin A1C    Albumin / creatinine urine ratio     Other Visit Diagnoses       Medicare annual wellness visit, subsequent    -  Primary    Subacute sinusitis, unspecified location        Relevant Medications    sulfamethoxazole-trimethoprim (BACTRIM DS) 800-160 mg per tablet    methylPREDNISolone 4 MG tablet therapy pack            Depression Screening and Follow-up Plan: Patient was screened for depression during today's encounter. They screened negative with a PHQ-2 score of 0.    Falls Plan of Care: balance, strength, and gait training instructions were provided.       Preventive health issues were discussed with patient, and age appropriate screening tests were ordered as noted in patient's After Visit Summary.  Personalized health advice and appropriate referrals for health education or preventive services given if needed, as noted in patient's After Visit Summary.     History of Present Illness:     Patient presents for a Medicare Wellness  Visit    Patient comes in for checkup.  She has had persistent cough and sinus congestion for several weeks.       Patient Care Team:  Joe Gifford MD as PCP - General  Joe Gifford MD     Review of Systems:     Review of Systems   Constitutional:  Positive for fatigue.   HENT:  Positive for congestion.    Respiratory:  Positive for cough.    Cardiovascular: Negative.    Gastrointestinal: Negative.         Problem List:     Patient Active Problem List   Diagnosis    Allergic rhinitis, seasonal    Anemia    Anxiety    Osteoarthritis of right shoulder    Asthma    Breast cancer (HCC)    Hypertension    Fatty liver    Gout    Hyperlipidemia    Hypothyroidism    Hyperuricemia    Nocturnal leg cramps    Prediabetes    History of breast cancer    Contact dermatitis    History of vulvar dysplasia    Lichen sclerosus et atrophicus of the vulva    Restrictive lung disease    Osteoarthritis of shoulder    COPD, moderate (HCC)    Arthritis    Sore throat    Type 2 diabetes mellitus with stage 3a chronic kidney disease, without long-term current use of insulin (HCC)      Past Medical and Surgical History:     Past Medical History:   Diagnosis Date    Arthritis     Asthma     Cancer (HCC)     breast    Disease of thyroid gland     Hyperlipidemia     Hypertension      Past Surgical History:   Procedure Laterality Date    BACK SURGERY      L6! Correct--L6.    BREAST BIOPSY      reconstruction    EYE SURGERY      HAND SURGERY Right     Thumb/Wrist Prosthesis    JOINT REPLACEMENT      LUMBAR LAMINECTOMY      MASTECTOMY      NECK SURGERY      C4,5,6    AR ARTHROPLASTY GLENOHUMERAL JOINT TOTAL SHOULDER Right 04/21/2023    Procedure: Reverse total shoulder replacement;  Surgeon: Tu Bull MD;  Location: [facility];  Service: Orthopedics      Family History:     Family History   Problem Relation Age of Onset    Cancer Mother     Diabetes Mother     Heart disease Mother     Cancer Father       Social History:     Social History      Socioeconomic History    Marital status:      Spouse name: None    Number of children: None    Years of education: None    Highest education level: None   Occupational History    None   Tobacco Use    Smoking status: Former     Current packs/day: 0.00     Average packs/day: 2.0 packs/day for 30.0 years (60.0 ttl pk-yrs)     Types: Cigarettes     Start date: 1956     Quit date: 1986     Years since quittin.2    Smokeless tobacco: Never   Vaping Use    Vaping status: Never Used   Substance and Sexual Activity    Alcohol use: Yes     Alcohol/week: 14.0 standard drinks of alcohol     Types: 14 Glasses of wine per week     Comment: 1-2 daily    Drug use: Yes     Types: Marijuana     Comment: medical for sleep    Sexual activity: None   Other Topics Concern    None   Social History Narrative    Golf     Never uses seat belt      Social Determinants of Health     Financial Resource Strain: Low Risk  (2024)    Overall Financial Resource Strain (CARDIA)     Difficulty of Paying Living Expenses: Not hard at all   Food Insecurity: Not on file   Transportation Needs: No Transportation Needs (2024)    PRAPARE - Transportation     Lack of Transportation (Medical): No     Lack of Transportation (Non-Medical): No   Physical Activity: Not on file   Stress: Not on file   Social Connections: Not on file   Intimate Partner Violence: Not on file   Housing Stability: Not on file      Medications and Allergies:     Current Outpatient Medications   Medication Sig Dispense Refill    albuterol (Proventil HFA) 90 mcg/act inhaler Inhale 2 puffs 3 (three) times a day as needed for wheezing or shortness of breath 6.7 g 0    allopurinol (ZYLOPRIM) 100 mg tablet Take 100 mg by mouth every morning      APPLE CIDER VINEGAR PO apple cider vinegar      benzonatate (TESSALON) 200 MG capsule Take 1 capsule (200 mg total) by mouth 3 (three) times a day as needed for cough 20 capsule 0    cetirizine (ZyrTEC) 10 mg  tablet Take 10 mg by mouth daily      Cholecalciferol (Vitamin D3) 1.25 MG (31671 UT) CAPS Vitamin D3      [employer] TURMERIC CURCUMIN PO Curcumin      diphenoxylate-atropine (LOMOTIL) 2.5-0.025 mg per tablet Take 1 tablet by mouth 4 (four) times a day as needed for diarrhea 20 tablet 0    hydrochlorothiazide (HYDRODIURIL) 12.5 mg tablet Take 1 tablet (12.5 mg total) by mouth daily 90 tablet 5    Hydrocod Star-Chlorphe Star ER (TUSSIONEX) 10-8 mg/5 mL ER suspension Take 5 mL by mouth every 12 (twelve) hours as needed for cough Max Daily Amount: 10 mL 115 mL 0    ibuprofen (MOTRIN) 400 mg tablet Take 1 tablet (400 mg total) by mouth 3 (three) times a day as needed for mild pain 90 tablet 5    KRILL OIL PO krill oil      lidocaine (Xylocaine) 1 % Take 3 mL by injection route.      losartan (COZAAR) 50 mg tablet Take 1 tablet (50 mg total) by mouth daily 90 tablet 5    methylPREDNISolone 4 MG tablet therapy pack Use as directed on package 21 each 0    Multiple Vitamin (RA ONE DAILY MULTI-VITAMIN PO) Daily Multi-Vitamin      Nexletol 180 MG TABS Take 1 tablet by mouth every morning      NP Thyroid 90 MG tablet Take 1 tablet by mouth once daily 90 tablet 0    Probiotic Product (PROBIOTIC BLEND PO) Probiotic      Restasis 0.05 % ophthalmic emulsion INSTILL 1 DROP INTO EACH EYE TWICE DAILY      sulfamethoxazole-trimethoprim (BACTRIM DS) 800-160 mg per tablet Take 1 tablet by mouth 2 (two) times a day for 3 days 6 tablet 0    SUPER B COMPLEX/C PO Super B Complex      Trelegy Ellipta 100-62.5-25 MCG/INH inhaler Inhale 1 puff daily      triamcinolone (KENALOG) 0.5 % cream Apply topically 3 (three) times a day 30 g 5    EPINEPHrine (EpiPen 2-Ever) 0.3 mg/0.3 mL SOAJ Inject 0.3 mL (0.3 mg total) into a muscle once for 1 dose 0.6 mL 1     No current facility-administered medications for this visit.     Allergies   Allergen Reactions    Oxycontin [Oxycodone Hcl] Dizziness    Amoxicillin Rash    Cefprozil Rash    Cephalexin Rash     Ciprofloxacin Rash    Doxycycline Rash    Erythromycin Rash    Levofloxacin Rash    Penicillins Rash      Immunizations:     Immunization History   Administered Date(s) Administered    COVID-19 J&J (Intellicheck Mobilisa) vaccine 0.5 mL 03/26/2021    COVID-19 MODERNA VACC 0.5 ML IM 11/20/2021    Pneumococcal Conjugate 13-Valent 04/02/2015    Pneumococcal Conjugate Vaccine 20-valent (Pcv20), Polysace 02/13/2023    Pneumococcal Polysaccharide PPV23 09/16/2011, 10/21/2016    Tdap 11/17/2015    Zoster 06/13/2015      Health Maintenance:         Topic Date Due    Breast Cancer Screening: Mammogram  04/06/2024 (Originally 9/24/2020)    Colorectal Cancer Screening  11/13/2024    Hepatitis C Screening  Addressed         Topic Date Due    Influenza Vaccine (1) Never done    COVID-19 Vaccine (3 - 2023-24 season) 09/01/2023      Medicare Screening Tests and Risk Assessments:     Hope is here for her Subsequent Wellness visit. Last Medicare Wellness visit information reviewed, patient interviewed and updates made to the record today.      Health Risk Assessment:   Patient rates overall health as very good. Patient feels that their physical health rating is same. Patient is very satisfied with their life. Eyesight was rated as same. Hearing was rated as same. Patient feels that their emotional and mental health rating is same. Patients states they are never, rarely angry. Patient states they are sometimes unusually tired/fatigued. Pain experienced in the last 7 days has been some. Patient's pain rating has been 2/10. Patient states that she has experienced no weight loss or gain in last 6 months.     Depression Screening:   PHQ-2 Score: 0      Fall Risk Screening:   In the past year, patient has experienced: no history of falling in past year      Urinary Incontinence Screening:   Patient has not leaked urine accidently in the last six months.     Home Safety:  Patient does not have trouble with stairs inside or outside of their home. Patient  has working smoke alarms and has working carbon monoxide detector. Home safety hazards include: none.     Nutrition:   Current diet is Regular.     Medications:   Patient is currently taking over-the-counter supplements. OTC medications include: see medication list. Patient is able to manage medications.     Activities of Daily Living (ADLs)/Instrumental Activities of Daily Living (IADLs):   Walk and transfer into and out of bed and chair?: Yes  Dress and groom yourself?: Yes    Bathe or shower yourself?: Yes    Feed yourself? Yes  Do your laundry/housekeeping?: Yes  Manage your money, pay your bills and track your expenses?: Yes  Make your own meals?: Yes    Do your own shopping?: Yes    Previous Hospitalizations:   Any hospitalizations or ED visits within the last 12 months?: Yes    How many hospitalizations have you had in the last year?: 1-2    Hospitalization Comments: Right shoulder replacement april 2023    Advance Care Planning:   Living will: Yes    Durable POA for healthcare: Yes    Advanced directive: Yes    Advanced directive counseling given: Yes    Five wishes given: No    End of Life Decisions reviewed with patient: Yes    Provider agrees with end of life decisions: Yes      PREVENTIVE SCREENINGS      Cardiovascular Screening:    General: Screening Not Indicated, History Lipid Disorder and Screening Current      Diabetes Screening:     General: Screening Current      Colorectal Cancer Screening:     General: Screening Current      Breast Cancer Screening:     General: History Breast Cancer      Cervical Cancer Screening:    General: Screening Not Indicated      Osteoporosis Screening:    General: Risks and Benefits Discussed    Due for: DXA Axial      Abdominal Aortic Aneurysm (AAA) Screening:        General: Screening Not Indicated and Screening Current      Lung Cancer Screening:     General: Screening Not Indicated      Hepatitis C Screening:    General: Screening Current    Screening, Brief  Intervention, and Referral to Treatment (SBIRT)    Screening  Typical number of drinks in a day: 0  Typical number of drinks in a week: 0  Interpretation: Low risk drinking behavior.    AUDIT-C Screenin) How often did you have a drink containing alcohol in the past year? 4 or more times a week  2) How many drinks did you have on a typical day when you were drinking in the past year? 3 to 4  3) How often did you have 6 or more drinks on one occasion in the past year? less than monthly    AUDIT-C Score: 5  Interpretation: Score 3-12 (female): POSITIVE screen for alcohol misuse    AUDIT Screenin) How often during the last year have you found that you were not able to stop drinking once you had started? 0 - never  5) How often during the last year have you failed to do what was normally expected from you because of drinking? 0 - never  6) How often during the last year have you needed a first drink in the morning to get yourself going after a heavy drinking session? 0 - never  7) How often during the last year have you had a feeling of guilt or remorse after drinking? 0 - never  8) How often during the last year have you been unable to remember what happened the night before because you had been drinking? 0 - never  9) Have you or someone else been injured as a result of your drinking? 0 - no  10) Has a relative or friend or a doctor or another health worker been concerned about your drinking or suggested you cut down? 0 - no    AUDIT Score: 5  Interpretation: Low risk alcohol consumption    Single Item Drug Screening:  How often have you used an illegal drug (including marijuana) or a prescription medication for non-medical reasons in the past year? never    Single Item Drug Screen Score: 0  Interpretation: Negative screen for possible drug use disorder    Brief Intervention  Alcohol & drug use screenings were reviewed. No concerns regarding substance use disorder identified.     Other Counseling Topics:  "  Car/seat belt/driving safety and sunscreen.     No results found.     Physical Exam:     /72 (BP Location: Left arm, Patient Position: Sitting, Cuff Size: Adult)   Pulse 78   Temp 97.5 °F (36.4 °C) (Tympanic)   Ht 5' 4.5\" (1.638 m)   Wt 66.2 kg (146 lb)   SpO2 99%   BMI 24.67 kg/m²     Physical Exam  Vitals and nursing note reviewed.   Constitutional:       Appearance: Normal appearance. She is well-developed.   HENT:      Head: Normocephalic and atraumatic.      Right Ear: Tympanic membrane, ear canal and external ear normal.      Left Ear: Tympanic membrane, ear canal and external ear normal.      Nose: Congestion present.      Mouth/Throat:      Mouth: Mucous membranes are moist.      Pharynx: Oropharynx is clear. Posterior oropharyngeal erythema present.   Eyes:      Conjunctiva/sclera: Conjunctivae normal.   Cardiovascular:      Rate and Rhythm: Normal rate and regular rhythm.      Pulses:           Dorsalis pedis pulses are 1+ on the right side and 1+ on the left side.        Posterior tibial pulses are 1+ on the right side and 1+ on the left side.      Heart sounds: Normal heart sounds.   Pulmonary:      Effort: Pulmonary effort is normal.      Breath sounds: Normal breath sounds.   Musculoskeletal:         General: No swelling.      Cervical back: Neck supple.   Feet:      Right foot:      Skin integrity: No ulcer, skin breakdown, erythema, warmth, callus or dry skin.      Left foot:      Skin integrity: No ulcer, skin breakdown, erythema, warmth, callus or dry skin.   Lymphadenopathy:      Cervical: No cervical adenopathy.   Skin:     General: Skin is warm and dry.      Capillary Refill: Capillary refill takes less than 2 seconds.   Neurological:      Mental Status: She is alert.   Psychiatric:         Mood and Affect: Mood normal.        Diabetic Foot Exam    Patient's shoes and socks removed.    Right Foot/Ankle   Right Foot Inspection  Skin Exam: skin normal and skin intact. No dry skin, no " warmth, no callus, no erythema, no maceration, no abnormal color, no pre-ulcer, no ulcer and no callus.     Toe Exam: ROM and strength within normal limits.     Sensory   Vibration: intact  Proprioception: intact  Monofilament testing: intact    Vascular  The right DP pulse is 1+. The right PT pulse is 1+.     Left Foot/Ankle  Left Foot Inspection  Skin Exam: skin normal and skin intact. No dry skin, no warmth, no erythema, no maceration, normal color, no pre-ulcer, no ulcer and no callus.     Toe Exam: ROM and strength within normal limits.     Sensory   Vibration: intact  Proprioception: intact  Monofilament testing: intact    Vascular  The left DP pulse is 1+. The left PT pulse is 1+.     Joe Gifford MD

## 2024-02-15 NOTE — PATIENT INSTRUCTIONS
Medicare Preventive Visit Patient Instructions  Thank you for completing your Welcome to Medicare Visit or Medicare Annual Wellness Visit today. Your next wellness visit will be due in one year (2/15/2025).  The screening/preventive services that you may require over the next 5-10 years are detailed below. Some tests may not apply to you based off risk factors and/or age. Screening tests ordered at today's visit but not completed yet may show as past due. Also, please note that scanned in results may not display below.  Preventive Screenings:  Service Recommendations Previous Testing/Comments   Colorectal Cancer Screening  * Colonoscopy    * Fecal Occult Blood Test (FOBT)/Fecal Immunochemical Test (FIT)  * Fecal DNA/Cologuard Test  * Flexible Sigmoidoscopy Age: 45-75 years old   Colonoscopy: every 10 years (may be performed more frequently if at higher risk)  OR  FOBT/FIT: every 1 year  OR  Cologuard: every 3 years  OR  Sigmoidoscopy: every 5 years  Screening may be recommended earlier than age 45 if at higher risk for colorectal cancer. Also, an individualized decision between you and your healthcare provider will decide whether screening between the ages of 76-85 would be appropriate. Colonoscopy: 11/13/2019  FOBT/FIT: Not on file  Cologuard: Not on file  Sigmoidoscopy: Not on file    Screening Current     Breast Cancer Screening Age: 40+ years old  Frequency: every 1-2 years  Not required if history of left and right mastectomy Mammogram: 09/24/2018    History Breast Cancer   Cervical Cancer Screening Between the ages of 21-29, pap smear recommended once every 3 years.   Between the ages of 30-65, can perform pap smear with HPV co-testing every 5 years.   Recommendations may differ for women with a history of total hysterectomy, cervical cancer, or abnormal pap smears in past. Pap Smear: Not on file    Screening Not Indicated   Hepatitis C Screening Once for adults born between 1945 and 1965  More frequently in  patients at high risk for Hepatitis C Hep C Antibody: Not on file    Screening Current   Diabetes Screening 1-2 times per year if you're at risk for diabetes or have pre-diabetes Fasting glucose: 115 mg/dL (1/23/2024)  A1C: 6.7 % (1/23/2024)  Screening Current   Cholesterol Screening Once every 5 years if you don't have a lipid disorder. May order more often based on risk factors. Lipid panel: 01/23/2024    Screening Not Indicated  History Lipid Disorder     Other Preventive Screenings Covered by Medicare:  Abdominal Aortic Aneurysm (AAA) Screening: covered once if your at risk. You're considered to be at risk if you have a family history of AAA.  Lung Cancer Screening: covers low dose CT scan once per year if you meet all of the following conditions: (1) Age 55-77; (2) No signs or symptoms of lung cancer; (3) Current smoker or have quit smoking within the last 15 years; (4) You have a tobacco smoking history of at least 20 pack years (packs per day multiplied by number of years you smoked); (5) You get a written order from a healthcare provider.  Glaucoma Screening: covered annually if you're considered high risk: (1) You have diabetes OR (2) Family history of glaucoma OR (3)  aged 50 and older OR (4)  American aged 65 and older  Osteoporosis Screening: covered every 2 years if you meet one of the following conditions: (1) You're estrogen deficient and at risk for osteoporosis based off medical history and other findings; (2) Have a vertebral abnormality; (3) On glucocorticoid therapy for more than 3 months; (4) Have primary hyperparathyroidism; (5) On osteoporosis medications and need to assess response to drug therapy.   Last bone density test (DXA Scan): 08/14/2014.  HIV Screening: covered annually if you're between the age of 15-65. Also covered annually if you are younger than 15 and older than 65 with risk factors for HIV infection. For pregnant patients, it is covered up to 3 times  per pregnancy.    Immunizations:  Immunization Recommendations   Influenza Vaccine Annual influenza vaccination during flu season is recommended for all persons aged >= 6 months who do not have contraindications   Pneumococcal Vaccine   * Pneumococcal conjugate vaccine = PCV13 (Prevnar 13), PCV15 (Vaxneuvance), PCV20 (Prevnar 20)  * Pneumococcal polysaccharide vaccine = PPSV23 (Pneumovax) Adults 19-63 yo with certain risk factors or if 65+ yo  If never received any pneumonia vaccine: recommend Prevnar 20 (PCV20)  Give PCV20 if previously received 1 dose of PCV13 or PPSV23   Hepatitis B Vaccine 3 dose series if at intermediate or high risk (ex: diabetes, end stage renal disease, liver disease)   Respiratory syncytial virus (RSV) Vaccine - COVERED BY MEDICARE PART D  * RSVPreF3 (Arexvy) CDC recommends that adults 60 years of age and older may receive a single dose of RSV vaccine using shared clinical decision-making (SCDM)   Tetanus (Td) Vaccine - COST NOT COVERED BY MEDICARE PART B Following completion of primary series, a booster dose should be given every 10 years to maintain immunity against tetanus. Td may also be given as tetanus wound prophylaxis.   Tdap Vaccine - COST NOT COVERED BY MEDICARE PART B Recommended at least once for all adults. For pregnant patients, recommended with each pregnancy.   Shingles Vaccine (Shingrix) - COST NOT COVERED BY MEDICARE PART B  2 shot series recommended in those 19 years and older who have or will have weakened immune systems or those 50 years and older     Health Maintenance Due:      Topic Date Due   • Breast Cancer Screening: Mammogram  04/06/2024 (Originally 9/24/2020)   • Colorectal Cancer Screening  11/13/2024   • Hepatitis C Screening  Addressed     Immunizations Due:      Topic Date Due   • Influenza Vaccine (1) Never done   • COVID-19 Vaccine (3 - 2023-24 season) 09/01/2023     Advance Directives   What are advance directives?  Advance directives are legal documents  that state your wishes and plans for medical care. These plans are made ahead of time in case you lose your ability to make decisions for yourself. Advance directives can apply to any medical decision, such as the treatments you want, and if you want to donate organs.   What are the types of advance directives?  There are many types of advance directives, and each state has rules about how to use them. You may choose a combination of any of the following:  Living will:  This is a written record of the treatment you want. You can also choose which treatments you do not want, which to limit, and which to stop at a certain time. This includes surgery, medicine, IV fluid, and tube feedings.   Durable power of  for healthcare (DPAHC):  This is a written record that states who you want to make healthcare choices for you when you are unable to make them for yourself. This person, called a proxy, is usually a family member or a friend. You may choose more than 1 proxy.  Do not resuscitate (DNR) order:  A DNR order is used in case your heart stops beating or you stop breathing. It is a request not to have certain forms of treatment, such as CPR. A DNR order may be included in other types of advance directives.  Medical directive:  This covers the care that you want if you are in a coma, near death, or unable to make decisions for yourself. You can list the treatments you want for each condition. Treatment may include pain medicine, surgery, blood transfusions, dialysis, IV or tube feedings, and a ventilator (breathing machine).  Values history:  This document has questions about your views, beliefs, and how you feel and think about life. This information can help others choose the care that you would choose.  Why are advance directives important?  An advance directive helps you control your care. Although spoken wishes may be used, it is better to have your wishes written down. Spoken wishes can be misunderstood, or  "not followed. Treatments may be given even if you do not want them. An advance directive may make it easier for your family to make difficult choices about your care.   Alcohol Use and Your Health    Drinking too much can harm your health.  Excessive alcohol use leads to about 88,000 death in the [place] each year, and shortens the life of those who diet by almost 30 years.  Further, excessive drinking cost the economy $249 billion in 2010.  Most excessive drinkers are not alcohol dependent.    Excessive alcohol use has immediate effects that increase the risk of many harmful health conditions.  These are most often the result of binge drinking.  Over time, excessive alcohol use can lead to the development of chronic diseases and other series health problems.    What is considered a \"drink\"?        Excessive alcohol use includes:  Binge Drinking: For women, 4 or more drinks consumed on one occasion. For men, 5 or more drinks consumed on one occasion.  Heavy Drinking: For women, 8 or more drinks per week. For men, 15 or more drinks per week  Any alcohol used by pregnant women  Any alcohol used by those under the age of 21 years    If you choose to drink, do so in moderation:  Do not drink at all if you are under the age of 21, or if you are or may be pregnant, or have health problems that could be made worse by drinking.  For women, up to 1 drink per day  For men, up to 2 drinks a day    No one should begin drinking or drink more frequently based on potential health benefits    Short-Term Health Risks:  Injuries: motor vehicle crashes, falls, drownings, burns  Violence: homicide, suicide, sexual assault, intimate partner violence  Alcohol poisoning  Reproductive health: risky sexual behaviors, unintended prengnacy, sexually transmitted diseases, miscarriage, stillbirth, fetal alcohol syndrome    Long-Term Health Risks:  Chronic diseases: high blood pressure, heart disease, stroke, liver disease, digestive " problems  Cancers: breast, mouth and throat, liver, colon  Learning and memory problems: dementia, poor school performance  Mental health: depression, anxiety, insomnia  Social problems: lost productivity, family problems, unemployment  Alcohol dependence    For support and more information:  Substance Abuse and Mental Health Services Administration  [address]  [place] [address]  Web Address: http://www.[facility].gov    Alcoholics Anonymous        Web Address: http://www.aa.org    https://www.cdc.gov/alcohol/fact-sheets/alcohol-use.htm     © Copyright [employer] 2018 Information is for End User's use only and may not be sold, redistributed or otherwise used for commercial purposes. All illustrations and images included in CareNotes® are the copyrighted property of "[employer] or [employer]      Medicare Preventive Visit Patient Instructions  Thank you for completing your Welcome to Medicare Visit or Medicare Annual Wellness Visit today. Your next wellness visit will be due in one year (2/15/2025).  The screening/preventive services that you may require over the next 5-10 years are detailed below. Some tests may not apply to you based off risk factors and/or age. Screening tests ordered at today's visit but not completed yet may show as past due. Also, please note that scanned in results may not display below.  Preventive Screenings:  Service Recommendations Previous Testing/Comments   Colorectal Cancer Screening  * Colonoscopy    * Fecal Occult Blood Test (FOBT)/Fecal Immunochemical Test (FIT)  * Fecal DNA/Cologuard Test  * Flexible Sigmoidoscopy Age: 45-75 years old   Colonoscopy: every 10 years (may be performed more frequently if at higher risk)  OR  FOBT/FIT: every 1 year  OR  Cologuard: every 3 years  OR  Sigmoidoscopy: every 5 years  Screening may be recommended earlier than age 45 if at higher risk for colorectal cancer. Also, an individualized decision between you and your healthcare  provider will decide whether screening between the ages of 76-85 would be appropriate. Colonoscopy: 11/13/2019  FOBT/FIT: Not on file  Cologuard: Not on file  Sigmoidoscopy: Not on file    Screening Current     Breast Cancer Screening Age: 40+ years old  Frequency: every 1-2 years  Not required if history of left and right mastectomy Mammogram: 09/24/2018    History Breast Cancer   Cervical Cancer Screening Between the ages of 21-29, pap smear recommended once every 3 years.   Between the ages of 30-65, can perform pap smear with HPV co-testing every 5 years.   Recommendations may differ for women with a history of total hysterectomy, cervical cancer, or abnormal pap smears in past. Pap Smear: Not on file    Screening Not Indicated   Hepatitis C Screening Once for adults born between 1945 and 1965  More frequently in patients at high risk for Hepatitis C Hep C Antibody: Not on file    Screening Current   Diabetes Screening 1-2 times per year if you're at risk for diabetes or have pre-diabetes Fasting glucose: 115 mg/dL (1/23/2024)  A1C: 6.7 % (1/23/2024)  Screening Current   Cholesterol Screening Once every 5 years if you don't have a lipid disorder. May order more often based on risk factors. Lipid panel: 01/23/2024    Screening Not Indicated  History Lipid Disorder     Other Preventive Screenings Covered by Medicare:  Abdominal Aortic Aneurysm (AAA) Screening: covered once if your at risk. You're considered to be at risk if you have a family history of AAA.  Lung Cancer Screening: covers low dose CT scan once per year if you meet all of the following conditions: (1) Age 55-77; (2) No signs or symptoms of lung cancer; (3) Current smoker or have quit smoking within the last 15 years; (4) You have a tobacco smoking history of at least 20 pack years (packs per day multiplied by number of years you smoked); (5) You get a written order from a healthcare provider.  Glaucoma Screening: covered annually if you're  considered high risk: (1) You have diabetes OR (2) Family history of glaucoma OR (3)  aged 50 and older OR (4)  American aged 65 and older  Osteoporosis Screening: covered every 2 years if you meet one of the following conditions: (1) You're estrogen deficient and at risk for osteoporosis based off medical history and other findings; (2) Have a vertebral abnormality; (3) On glucocorticoid therapy for more than 3 months; (4) Have primary hyperparathyroidism; (5) On osteoporosis medications and need to assess response to drug therapy.   Last bone density test (DXA Scan): 08/14/2014.  HIV Screening: covered annually if you're between the age of 15-65. Also covered annually if you are younger than 15 and older than 65 with risk factors for HIV infection. For pregnant patients, it is covered up to 3 times per pregnancy.    Immunizations:  Immunization Recommendations   Influenza Vaccine Annual influenza vaccination during flu season is recommended for all persons aged >= 6 months who do not have contraindications   Pneumococcal Vaccine   * Pneumococcal conjugate vaccine = PCV13 (Prevnar 13), PCV15 (Vaxneuvance), PCV20 (Prevnar 20)  * Pneumococcal polysaccharide vaccine = PPSV23 (Pneumovax) Adults 19-63 yo with certain risk factors or if 65+ yo  If never received any pneumonia vaccine: recommend Prevnar 20 (PCV20)  Give PCV20 if previously received 1 dose of PCV13 or PPSV23   Hepatitis B Vaccine 3 dose series if at intermediate or high risk (ex: diabetes, end stage renal disease, liver disease)   Respiratory syncytial virus (RSV) Vaccine - COVERED BY MEDICARE PART D  * RSVPreF3 (Arexvy) CDC recommends that adults 60 years of age and older may receive a single dose of RSV vaccine using shared clinical decision-making (SCDM)   Tetanus (Td) Vaccine - COST NOT COVERED BY MEDICARE PART B Following completion of primary series, a booster dose should be given every 10 years to maintain immunity against  tetanus. Td may also be given as tetanus wound prophylaxis.   Tdap Vaccine - COST NOT COVERED BY MEDICARE PART B Recommended at least once for all adults. For pregnant patients, recommended with each pregnancy.   Shingles Vaccine (Shingrix) - COST NOT COVERED BY MEDICARE PART B  2 shot series recommended in those 19 years and older who have or will have weakened immune systems or those 50 years and older     Health Maintenance Due:      Topic Date Due   • Breast Cancer Screening: Mammogram  04/06/2024 (Originally 9/24/2020)   • Colorectal Cancer Screening  11/13/2024   • Hepatitis C Screening  Addressed     Immunizations Due:      Topic Date Due   • Influenza Vaccine (1) Never done   • COVID-19 Vaccine (3 - 2023-24 season) 09/01/2023     Advance Directives   What are advance directives?  Advance directives are legal documents that state your wishes and plans for medical care. These plans are made ahead of time in case you lose your ability to make decisions for yourself. Advance directives can apply to any medical decision, such as the treatments you want, and if you want to donate organs.   What are the types of advance directives?  There are many types of advance directives, and each state has rules about how to use them. You may choose a combination of any of the following:  Living will:  This is a written record of the treatment you want. You can also choose which treatments you do not want, which to limit, and which to stop at a certain time. This includes surgery, medicine, IV fluid, and tube feedings.   Durable power of  for healthcare (DPAHC):  This is a written record that states who you want to make healthcare choices for you when you are unable to make them for yourself. This person, called a proxy, is usually a family member or a friend. You may choose more than 1 proxy.  Do not resuscitate (DNR) order:  A DNR order is used in case your heart stops beating or you stop breathing. It is a request  "not to have certain forms of treatment, such as CPR. A DNR order may be included in other types of advance directives.  Medical directive:  This covers the care that you want if you are in a coma, near death, or unable to make decisions for yourself. You can list the treatments you want for each condition. Treatment may include pain medicine, surgery, blood transfusions, dialysis, IV or tube feedings, and a ventilator (breathing machine).  Values history:  This document has questions about your views, beliefs, and how you feel and think about life. This information can help others choose the care that you would choose.  Why are advance directives important?  An advance directive helps you control your care. Although spoken wishes may be used, it is better to have your wishes written down. Spoken wishes can be misunderstood, or not followed. Treatments may be given even if you do not want them. An advance directive may make it easier for your family to make difficult choices about your care.   Alcohol Use and Your Health    Drinking too much can harm your health.  Excessive alcohol use leads to about 88,000 death in the [place] each year, and shortens the life of those who diet by almost 30 years.  Further, excessive drinking cost the economy $249 billion in 2010.  Most excessive drinkers are not alcohol dependent.    Excessive alcohol use has immediate effects that increase the risk of many harmful health conditions.  These are most often the result of binge drinking.  Over time, excessive alcohol use can lead to the development of chronic diseases and other series health problems.    What is considered a \"drink\"?        Excessive alcohol use includes:  Binge Drinking: For women, 4 or more drinks consumed on one occasion. For men, 5 or more drinks consumed on one occasion.  Heavy Drinking: For women, 8 or more drinks per week. For men, 15 or more drinks per week  Any alcohol used by pregnant women  Any alcohol " used by those under the age of 21 years    If you choose to drink, do so in moderation:  Do not drink at all if you are under the age of 21, or if you are or may be pregnant, or have health problems that could be made worse by drinking.  For women, up to 1 drink per day  For men, up to 2 drinks a day    No one should begin drinking or drink more frequently based on potential health benefits    Short-Term Health Risks:  Injuries: motor vehicle crashes, falls, drownings, burns  Violence: homicide, suicide, sexual assault, intimate partner violence  Alcohol poisoning  Reproductive health: risky sexual behaviors, unintended prengnacy, sexually transmitted diseases, miscarriage, stillbirth, fetal alcohol syndrome    Long-Term Health Risks:  Chronic diseases: high blood pressure, heart disease, stroke, liver disease, digestive problems  Cancers: breast, mouth and throat, liver, colon  Learning and memory problems: dementia, poor school performance  Mental health: depression, anxiety, insomnia  Social problems: lost productivity, family problems, unemployment  Alcohol dependence    For support and more information:  Substance Abuse and Mental Health Services Administration  [address]  [place] [address]  Web Address: http://www.[facility].gov    Alcoholics Anonymous        Web Address: http://www.aa.org    https://www.cdc.gov/alcohol/fact-sheets/alcohol-use.htm     © Copyright [employer] 2018 Information is for End User's use only and may not be sold, redistributed or otherwise used for commercial purposes. All illustrations and images included in CareNotes® are the copyrighted property of [employer] or [employer]

## 2024-04-04 DIAGNOSIS — E03.9 HYPOTHYROIDISM, UNSPECIFIED TYPE: ICD-10-CM

## 2024-04-04 DIAGNOSIS — I10 ESSENTIAL HYPERTENSION: ICD-10-CM

## 2024-04-05 RX ORDER — LEVOTHYROXINE, LIOTHYRONINE 57; 13.5 UG/1; UG/1
TABLET ORAL
Qty: 90 TABLET | Refills: 0 | Status: SHIPPED | OUTPATIENT
Start: 2024-04-05

## 2024-04-05 RX ORDER — HYDROCHLOROTHIAZIDE 12.5 MG/1
12.5 TABLET ORAL DAILY
Qty: 90 TABLET | Refills: 0 | Status: SHIPPED | OUTPATIENT
Start: 2024-04-05

## 2024-04-05 RX ORDER — LOSARTAN POTASSIUM 50 MG/1
50 TABLET ORAL DAILY
Qty: 90 TABLET | Refills: 0 | Status: SHIPPED | OUTPATIENT
Start: 2024-04-05

## 2024-07-08 DIAGNOSIS — I10 ESSENTIAL HYPERTENSION: ICD-10-CM

## 2024-07-08 DIAGNOSIS — E03.9 HYPOTHYROIDISM, UNSPECIFIED TYPE: ICD-10-CM

## 2024-07-08 RX ORDER — LOSARTAN POTASSIUM 50 MG/1
50 TABLET ORAL DAILY
Qty: 30 TABLET | Refills: 0 | Status: SHIPPED | OUTPATIENT
Start: 2024-07-08

## 2024-07-08 RX ORDER — HYDROCHLOROTHIAZIDE 12.5 MG/1
12.5 TABLET ORAL DAILY
Qty: 30 TABLET | Refills: 0 | Status: SHIPPED | OUTPATIENT
Start: 2024-07-08

## 2024-07-08 RX ORDER — THYROID 90 MG/1
90 TABLET ORAL DAILY
Qty: 30 TABLET | Refills: 0 | Status: SHIPPED | OUTPATIENT
Start: 2024-07-08

## 2024-07-11 ENCOUNTER — APPOINTMENT (OUTPATIENT)
Dept: LAB | Facility: HOSPITAL | Age: 79
End: 2024-07-11
Payer: MEDICARE

## 2024-07-11 DIAGNOSIS — N18.31 TYPE 2 DIABETES MELLITUS WITH STAGE 3A CHRONIC KIDNEY DISEASE, WITHOUT LONG-TERM CURRENT USE OF INSULIN (HCC): ICD-10-CM

## 2024-07-11 DIAGNOSIS — E11.22 TYPE 2 DIABETES MELLITUS WITH STAGE 3A CHRONIC KIDNEY DISEASE, WITHOUT LONG-TERM CURRENT USE OF INSULIN (HCC): ICD-10-CM

## 2024-07-11 DIAGNOSIS — E78.5 HYPERLIPIDEMIA, UNSPECIFIED HYPERLIPIDEMIA TYPE: ICD-10-CM

## 2024-07-11 DIAGNOSIS — E03.9 HYPOTHYROIDISM, UNSPECIFIED TYPE: ICD-10-CM

## 2024-07-11 LAB
ALBUMIN SERPL BCG-MCNC: 4.3 G/DL (ref 3.5–5)
ALP SERPL-CCNC: 49 U/L (ref 34–104)
ALT SERPL W P-5'-P-CCNC: 17 U/L (ref 7–52)
ANION GAP SERPL CALCULATED.3IONS-SCNC: 8 MMOL/L (ref 4–13)
AST SERPL W P-5'-P-CCNC: 24 U/L (ref 13–39)
BASOPHILS # BLD AUTO: 0.05 THOUSANDS/ÂΜL (ref 0–0.1)
BASOPHILS NFR BLD AUTO: 1 % (ref 0–1)
BILIRUB SERPL-MCNC: 0.68 MG/DL (ref 0.2–1)
BUN SERPL-MCNC: 26 MG/DL (ref 5–25)
CALCIUM SERPL-MCNC: 9.7 MG/DL (ref 8.4–10.2)
CHLORIDE SERPL-SCNC: 106 MMOL/L (ref 96–108)
CHOLEST SERPL-MCNC: 153 MG/DL
CO2 SERPL-SCNC: 28 MMOL/L (ref 21–32)
CREAT SERPL-MCNC: 0.96 MG/DL (ref 0.6–1.3)
CREAT UR-MCNC: 135.8 MG/DL
EOSINOPHIL # BLD AUTO: 0.45 THOUSAND/ÂΜL (ref 0–0.61)
EOSINOPHIL NFR BLD AUTO: 8 % (ref 0–6)
ERYTHROCYTE [DISTWIDTH] IN BLOOD BY AUTOMATED COUNT: 14.9 % (ref 11.6–15.1)
EST. AVERAGE GLUCOSE BLD GHB EST-MCNC: 131 MG/DL
GFR SERPL CREATININE-BSD FRML MDRD: 57 ML/MIN/1.73SQ M
GLUCOSE P FAST SERPL-MCNC: 110 MG/DL (ref 65–99)
HBA1C MFR BLD: 6.2 %
HCT VFR BLD AUTO: 36.5 % (ref 34.8–46.1)
HDLC SERPL-MCNC: 78 MG/DL
HGB BLD-MCNC: 12.2 G/DL (ref 11.5–15.4)
IMM GRANULOCYTES # BLD AUTO: 0.02 THOUSAND/UL (ref 0–0.2)
IMM GRANULOCYTES NFR BLD AUTO: 0 % (ref 0–2)
LDLC SERPL CALC-MCNC: 60 MG/DL (ref 0–100)
LYMPHOCYTES # BLD AUTO: 1.77 THOUSANDS/ÂΜL (ref 0.6–4.47)
LYMPHOCYTES NFR BLD AUTO: 32 % (ref 14–44)
MCH RBC QN AUTO: 31.9 PG (ref 26.8–34.3)
MCHC RBC AUTO-ENTMCNC: 33.4 G/DL (ref 31.4–37.4)
MCV RBC AUTO: 96 FL (ref 82–98)
MICROALBUMIN UR-MCNC: 11.4 MG/L
MICROALBUMIN/CREAT 24H UR: 8 MG/G CREATININE (ref 0–30)
MONOCYTES # BLD AUTO: 0.37 THOUSAND/ÂΜL (ref 0.17–1.22)
MONOCYTES NFR BLD AUTO: 7 % (ref 4–12)
NEUTROPHILS # BLD AUTO: 2.94 THOUSANDS/ÂΜL (ref 1.85–7.62)
NEUTS SEG NFR BLD AUTO: 52 % (ref 43–75)
NRBC BLD AUTO-RTO: 0 /100 WBCS
PLATELET # BLD AUTO: 273 THOUSANDS/UL (ref 149–390)
PMV BLD AUTO: 9.3 FL (ref 8.9–12.7)
POTASSIUM SERPL-SCNC: 3.8 MMOL/L (ref 3.5–5.3)
PROT SERPL-MCNC: 7.2 G/DL (ref 6.4–8.4)
RBC # BLD AUTO: 3.82 MILLION/UL (ref 3.81–5.12)
SODIUM SERPL-SCNC: 142 MMOL/L (ref 135–147)
TRIGL SERPL-MCNC: 73 MG/DL
TSH SERPL DL<=0.05 MIU/L-ACNC: 1.93 UIU/ML (ref 0.45–4.5)
WBC # BLD AUTO: 5.6 THOUSAND/UL (ref 4.31–10.16)

## 2024-07-11 PROCEDURE — 36415 COLL VENOUS BLD VENIPUNCTURE: CPT

## 2024-07-11 PROCEDURE — 85025 COMPLETE CBC W/AUTO DIFF WBC: CPT

## 2024-07-11 PROCEDURE — 82043 UR ALBUMIN QUANTITATIVE: CPT

## 2024-07-11 PROCEDURE — 80061 LIPID PANEL: CPT

## 2024-07-11 PROCEDURE — 82570 ASSAY OF URINE CREATININE: CPT

## 2024-07-11 PROCEDURE — 84443 ASSAY THYROID STIM HORMONE: CPT

## 2024-07-11 PROCEDURE — 80053 COMPREHEN METABOLIC PANEL: CPT

## 2024-07-11 PROCEDURE — 83036 HEMOGLOBIN GLYCOSYLATED A1C: CPT

## 2024-07-23 ENCOUNTER — OFFICE VISIT (OUTPATIENT)
Dept: FAMILY MEDICINE CLINIC | Facility: CLINIC | Age: 79
End: 2024-07-23
Payer: MEDICARE

## 2024-07-23 ENCOUNTER — TELEPHONE (OUTPATIENT)
Dept: ADMINISTRATIVE | Facility: OTHER | Age: 79
End: 2024-07-23

## 2024-07-23 VITALS
OXYGEN SATURATION: 95 % | DIASTOLIC BLOOD PRESSURE: 60 MMHG | BODY MASS INDEX: 24.56 KG/M2 | HEIGHT: 65 IN | SYSTOLIC BLOOD PRESSURE: 104 MMHG | WEIGHT: 147.4 LBS | HEART RATE: 64 BPM

## 2024-07-23 DIAGNOSIS — N18.31 TYPE 2 DIABETES MELLITUS WITH STAGE 3A CHRONIC KIDNEY DISEASE, WITHOUT LONG-TERM CURRENT USE OF INSULIN (HCC): ICD-10-CM

## 2024-07-23 DIAGNOSIS — E78.5 HYPERLIPIDEMIA, UNSPECIFIED HYPERLIPIDEMIA TYPE: ICD-10-CM

## 2024-07-23 DIAGNOSIS — J30.2 SEASONAL ALLERGIC RHINITIS, UNSPECIFIED TRIGGER: ICD-10-CM

## 2024-07-23 DIAGNOSIS — Z90.13 HISTORY OF BILATERAL MASTECTOMY: Primary | ICD-10-CM

## 2024-07-23 DIAGNOSIS — E03.9 HYPOTHYROIDISM, UNSPECIFIED TYPE: ICD-10-CM

## 2024-07-23 DIAGNOSIS — E11.22 TYPE 2 DIABETES MELLITUS WITH STAGE 3A CHRONIC KIDNEY DISEASE, WITHOUT LONG-TERM CURRENT USE OF INSULIN (HCC): ICD-10-CM

## 2024-07-23 DIAGNOSIS — I10 PRIMARY HYPERTENSION: Primary | ICD-10-CM

## 2024-07-23 DIAGNOSIS — J45.909 UNCOMPLICATED ASTHMA, UNSPECIFIED ASTHMA SEVERITY, UNSPECIFIED WHETHER PERSISTENT: ICD-10-CM

## 2024-07-23 PROCEDURE — 99214 OFFICE O/P EST MOD 30 MIN: CPT | Performed by: FAMILY MEDICINE

## 2024-07-23 PROCEDURE — G2211 COMPLEX E/M VISIT ADD ON: HCPCS | Performed by: FAMILY MEDICINE

## 2024-07-23 RX ORDER — FLUTICASONE PROPIONATE 50 MCG
SPRAY, SUSPENSION (ML) NASAL
COMMUNITY
Start: 2024-04-23

## 2024-07-23 NOTE — LETTER
2nd Request      Diabetic Eye Exam Form    Date Requested: 24  Patient: Hope Sue  Patient : 1945   Referring Provider: Joe Gifford MD      DIABETIC Eye Exam Date _______________________________      Type of Exam MUST be documented for Diabetic Eye Exams. Please CHECK ONE.     Retinal Exam       Dilated Retinal Exam       OCT       Optomap-Iris Exam      Fundus Photography       Left Eye - Please check Retinopathy or No Retinopathy        Exam did show retinopathy    Exam did not show retinopathy       Right Eye - Please check Retinopathy or No Retinopathy       Exam did show retinopathy    Exam did not show retinopathy       Comments __________________________________________________________    Practice Providing Exam ______________________________________________    Exam Performed By (print name) _______________________________________      Provider Signature ___________________________________________________      These reports are needed for  compliance.  Please fax this completed form and a copy of the Diabetic Eye Exam report to our office located at [address] [place] [address] as soon as possible via Fax [phone] attention Makayla: Phone [phone]  We thank you for your assistance in treating our mutual patient.

## 2024-07-23 NOTE — LETTER
Diabetic Eye Exam Form    Date Requested: 24  Patient: Hope Sue  Patient : 1945   Referring Provider: Joe Gifford MD      DIABETIC Eye Exam Date _______________________________      Type of Exam MUST be documented for Diabetic Eye Exams. Please CHECK ONE.     Retinal Exam       Dilated Retinal Exam       OCT       Optomap-Iris Exam      Fundus Photography       Left Eye - Please check Retinopathy or No Retinopathy        Exam did show retinopathy    Exam did not show retinopathy       Right Eye - Please check Retinopathy or No Retinopathy       Exam did show retinopathy    Exam did not show retinopathy       Comments __________________________________________________________    Practice Providing Exam ______________________________________________    Exam Performed By (print name) _______________________________________      Provider Signature ___________________________________________________      These reports are needed for  compliance.  Please fax this completed form and a copy of the Diabetic Eye Exam report to our office located at [address] [place] [address] as soon as possible via Fax [phone] attention Makayla: Phone [phone]  We thank you for your assistance in treating our mutual patient.

## 2024-07-23 NOTE — PROGRESS NOTES
Assessment/Plan:    Return visit in 6 months with fasting blood prior to visit.     Problem List Items Addressed This Visit       Allergic rhinitis, seasonal    Asthma     Continue Trelegy and albuterol as needed         Hypertension - Primary     Continue Cozaar 50 mg and hydrochlorothiazide 12.5 mg daily         Hyperlipidemia     Continue Nexletol 180 mg daily         Relevant Orders    CBC and differential    Lipid Panel with Direct LDL reflex    Hypothyroidism     Continue NP thyroid 90 mg daily         Relevant Orders    TSH, 3rd generation with Free T4 reflex    Type 2 diabetes mellitus with stage 3a chronic kidney disease, without long-term current use of insulin (HCC)    Relevant Orders    Comprehensive metabolic panel    Hemoglobin A1C         Subjective:      Patient ID: Hope Sue is a 79 y.o. female.    Patient comes in for checkup.        The following portions of the patient's history were reviewed and updated as appropriate:   Past Medical History:  She has a past medical history of Arthritis, Asthma, Cancer (HCC), Disease of thyroid gland, Hyperlipidemia, and Hypertension.,  _______________________________________________________________________  Medical Problems:  does not have any pertinent problems on file.,  _______________________________________________________________________  Past Surgical History:   has a past surgical history that includes Mastectomy; Hand surgery (Right); Breast biopsy; Lumbar laminectomy; Neck surgery; Back surgery; pr arthroplasty glenohumeral joint total shoulder (Right, 04/21/2023); Eye surgery; and Joint replacement.,  _______________________________________________________________________  Family History:  family history includes Cancer in her father and mother; Diabetes in her mother; Heart disease in her mother.,  _______________________________________________________________________  Social History:   reports that she quit smoking about 37 years ago. Her  smoking use included cigarettes. She started smoking about 67 years ago. She has a 60 pack-year smoking history. She has never used smokeless tobacco. She reports current alcohol use of about 14.0 standard drinks of alcohol per week. She reports current drug use. Drug: Marijuana.,  _______________________________________________________________________  Allergies:  is allergic to oxycontin [oxycodone hcl], amoxicillin, cefprozil, cephalexin, ciprofloxacin, doxycycline, erythromycin, levofloxacin, and penicillins..  _______________________________________________________________________  Current Outpatient Medications   Medication Sig Dispense Refill    albuterol (Proventil HFA) 90 mcg/act inhaler Inhale 2 puffs 3 (three) times a day as needed for wheezing or shortness of breath 6.7 g 0    allopurinol (ZYLOPRIM) 100 mg tablet Take 100 mg by mouth every morning      APPLE CIDER VINEGAR PO apple cider vinegar      cetirizine (ZyrTEC) 10 mg tablet Take 10 mg by mouth daily      Cholecalciferol (Vitamin D3) 1.25 MG (60472 UT) CAPS Vitamin D3      [employer] TURMERIC CURCUMIN PO Curcumin      diphenoxylate-atropine (LOMOTIL) 2.5-0.025 mg per tablet Take 1 tablet by mouth 4 (four) times a day as needed for diarrhea 20 tablet 0    fluticasone (FLONASE) 50 mcg/act nasal spray use 2 spray(s) in each nostril once daily      hydroCHLOROthiazide 12.5 mg tablet Take 1 tablet (12.5 mg total) by mouth daily 30 tablet 0    Hydrocod Star-Chlorphe Star ER (TUSSIONEX) 10-8 mg/5 mL ER suspension Take 5 mL by mouth every 12 (twelve) hours as needed for cough Max Daily Amount: 10 mL 115 mL 0    ibuprofen (MOTRIN) 400 mg tablet Take 1 tablet (400 mg total) by mouth 3 (three) times a day as needed for mild pain 90 tablet 5    KRILL OIL PO krill oil      lidocaine (Xylocaine) 1 % Take 3 mL by injection route.      losartan (COZAAR) 50 mg tablet Take 1 tablet (50 mg total) by mouth daily 30 tablet 0    Multiple Vitamin (RA ONE DAILY MULTI-VITAMIN  "PO) Daily Multi-Vitamin      Nexletol 180 MG TABS Take 1 tablet by mouth every morning      Probiotic Product (PROBIOTIC BLEND PO) Probiotic      Restasis 0.05 % ophthalmic emulsion INSTILL 1 DROP INTO EACH EYE TWICE DAILY      SUPER B COMPLEX/C PO Super B Complex      thyroid (NP Thyroid) 90 MG tablet Take 1 tablet (90 mg total) by mouth daily 30 tablet 0    Trelegy Ellipta 100-62.5-25 MCG/INH inhaler Inhale 1 puff daily      triamcinolone (KENALOG) 0.5 % cream Apply topically 3 (three) times a day 30 g 5    benzonatate (TESSALON) 200 MG capsule Take 1 capsule (200 mg total) by mouth 3 (three) times a day as needed for cough (Patient not taking: Reported on 2/15/2024) 20 capsule 0    EPINEPHrine (EpiPen 2-Ever) 0.3 mg/0.3 mL SOAJ Inject 0.3 mL (0.3 mg total) into a muscle once for 1 dose 0.6 mL 1    methylPREDNISolone 4 MG tablet therapy pack Use as directed on package (Patient not taking: Reported on 7/23/2024) 21 each 0     No current facility-administered medications for this visit.     _______________________________________________________________________  Review of Systems   Constitutional: Negative.    Respiratory: Negative.     Cardiovascular: Negative.          Objective:  Vitals:    07/23/24 0938   BP: 104/60   BP Location: Left arm   Patient Position: Sitting   Cuff Size: Standard   Pulse: 64   SpO2: 95%   Weight: 66.9 kg (147 lb 6.4 oz)   Height: 5' 4.5\" (1.638 m)     Body mass index is 24.91 kg/m².     Physical Exam  Constitutional:       Appearance: Normal appearance. She is well-developed.   HENT:      Head: Normocephalic and atraumatic.      Right Ear: External ear normal.      Left Ear: External ear normal.   Eyes:      Pupils: Pupils are equal, round, and reactive to light.   Neck:      Thyroid: No thyromegaly.   Cardiovascular:      Rate and Rhythm: Normal rate and regular rhythm.      Pulses: no weak pulses.           Dorsalis pedis pulses are 1+ on the right side and 1+ on the left side.        " Posterior tibial pulses are 1+ on the right side and 1+ on the left side.      Heart sounds: Normal heart sounds.   Pulmonary:      Effort: Pulmonary effort is normal.      Breath sounds: Normal breath sounds.   Musculoskeletal:         General: Normal range of motion.      Cervical back: Neck supple.   Feet:      Right foot:      Skin integrity: No ulcer, skin breakdown, erythema, warmth, callus or dry skin.      Left foot:      Skin integrity: No ulcer, skin breakdown, erythema, warmth, callus or dry skin.   Lymphadenopathy:      Cervical: No cervical adenopathy.   Skin:     General: Skin is warm and dry.   Neurological:      Mental Status: She is alert.   Psychiatric:         Mood and Affect: Mood normal.         Behavior: Behavior normal.       Diabetic Foot Exam    Patient's shoes and socks removed.    Right Foot/Ankle   Right Foot Inspection  Skin Exam: skin normal and skin intact. No dry skin, no warmth, no callus, no erythema, no maceration, no abnormal color, no pre-ulcer, no ulcer and no callus.     Toe Exam: ROM and strength within normal limits.     Sensory   Vibration: intact  Proprioception: intact  Monofilament testing: intact    Vascular  The right DP pulse is 1+. The right PT pulse is 1+.     Left Foot/Ankle  Left Foot Inspection  Skin Exam: skin normal and skin intact. No dry skin, no warmth, no erythema, no maceration, normal color, no pre-ulcer, no ulcer and no callus.     Toe Exam: ROM and strength within normal limits.     Sensory   Vibration: intact  Proprioception: intact  Monofilament testing: intact    Vascular  The left DP pulse is 1+. The left PT pulse is 1+.     Assign Risk Category  No deformity present  No loss of protective sensation  No weak pulses  Risk: 0

## 2024-07-23 NOTE — TELEPHONE ENCOUNTER
----- Message from Viola MEANS sent at 7/23/2024  9:47 AM EDT -----  07/23/24 9:47 AM    Hello, our patient Hope Sue has had Diabetic Foot Exam completed/performed. Please assist in updating the patient chart by pulling the document from the Media Tab. The date of service is 6/10/2024.     Thank you,  Viola ABDI [address] [place] [address]    07/23/24 9:49 AM    Hello, our patient Hope Sue has had Diabetic Eye Exam completed/performed. Please assist in updating the patient chart by making an External outreach to [facility] facility located in [place]. The date of service is 2023.    Thank you,  Viola STYLES FP [address] [place] [address]    07/23/24 9:55 AM    Hello, our patient Hope Sue has had Mammogram completed/performed. Please assist in updating the patient chart by pulling the document from the Imaging/Procedure Tab. The date of service is 03/07/2023. Patient gets MRI instead of mammogram because of history of cancer/\.     Thank you,  Viola STYLES FP [address] [place] [address]

## 2024-07-24 PROBLEM — Z90.13 HISTORY OF BILATERAL MASTECTOMY: Status: ACTIVE | Noted: 2024-07-24

## 2024-07-24 NOTE — TELEPHONE ENCOUNTER
Upon review of the In Basket request we have found/obtained the documentation. After careful review of the document we are unable to complete this request for Diabetic Foot Exam because the documentation does not have the result(s) needed to close the requested care gap(s).    Any additional questions or concerns should be emailed to the Practice Liaisons via the appropriate education email address, please do not reply via In Basket.    Thank you  Makayla Ivan MA   [facility]

## 2024-07-24 NOTE — TELEPHONE ENCOUNTER
07/24/24 10:56 AM    Hello, our patient attached above has had bilateral mastectomy completed/performed. Please assist in updating the patient chart by pulling the document from 3/7/23 Tab within Chart Review. The date of service is 37/23.     Thank you,  Makayal Ivan MA  [facility]

## 2024-07-24 NOTE — TELEPHONE ENCOUNTER
07/24/24 12:13 PM    After review of Exclusion document(s) for bilateral mastectomy, documentation qualifies as an exclusion and the problem list  within Epic has been updated.    Ashley Knox MA

## 2024-07-24 NOTE — TELEPHONE ENCOUNTER
Upon review of the In Basket request and the patient's chart, initial outreach has been made via fax to facility. Please see Contacts section for details.     Thank you  Makayla Ivan MA

## 2024-07-26 NOTE — TELEPHONE ENCOUNTER
As a follow-up, a second attempt has been made for outreach via fax to facility. Please see Contacts section for details.    Thank you  Makayla Ivan MA

## 2024-07-30 DIAGNOSIS — I10 ESSENTIAL HYPERTENSION: ICD-10-CM

## 2024-07-30 DIAGNOSIS — E03.9 HYPOTHYROIDISM, UNSPECIFIED TYPE: ICD-10-CM

## 2024-07-31 RX ORDER — HYDROCHLOROTHIAZIDE 12.5 MG/1
12.5 TABLET ORAL DAILY
Qty: 100 TABLET | Refills: 1 | Status: SHIPPED | OUTPATIENT
Start: 2024-07-31

## 2024-07-31 RX ORDER — LOSARTAN POTASSIUM 50 MG/1
50 TABLET ORAL DAILY
Qty: 100 TABLET | Refills: 1 | Status: SHIPPED | OUTPATIENT
Start: 2024-07-31

## 2024-07-31 RX ORDER — LEVOTHYROXINE, LIOTHYRONINE 57; 13.5 UG/1; UG/1
90 TABLET ORAL DAILY
Qty: 100 TABLET | Refills: 1 | Status: SHIPPED | OUTPATIENT
Start: 2024-07-31

## 2024-08-08 NOTE — TELEPHONE ENCOUNTER
As a final attempt, a third outreach has been made via telephone call to facility. Please see Contacts section for details. This encounter will be closed and completed by end of day. Should we receive the requested information because of previous outreach attempts, the requested patient's chart will be updated appropriately.     Thank you  Makayla Ivan MA

## 2024-08-08 NOTE — TELEPHONE ENCOUNTER
Upon review of the In Basket request we have found as a result of outreach that patient did not have the requested item(s) completed.  Diabetic eye exam    Any additional questions or concerns should be emailed to the Practice Liaisons via the appropriate education email address, please do not reply via In Basket.    Thank you  Makayla Ivan MA   [facility]

## 2024-09-11 ENCOUNTER — TELEPHONE (OUTPATIENT)
Age: 79
End: 2024-09-11

## 2024-10-24 DIAGNOSIS — J06.9 ACUTE URI: Primary | ICD-10-CM

## 2024-10-24 RX ORDER — AZITHROMYCIN 250 MG/1
TABLET, FILM COATED ORAL
Qty: 6 TABLET | Refills: 0 | Status: SHIPPED | OUTPATIENT
Start: 2024-10-24 | End: 2024-10-28

## 2024-11-06 DIAGNOSIS — M19.90 ARTHRITIS: ICD-10-CM

## 2024-11-07 RX ORDER — IBUPROFEN 400 MG/1
400 TABLET, FILM COATED ORAL 3 TIMES DAILY PRN
Qty: 90 TABLET | Refills: 0 | Status: SHIPPED | OUTPATIENT
Start: 2024-11-07

## 2025-01-27 DIAGNOSIS — I10 ESSENTIAL HYPERTENSION: ICD-10-CM

## 2025-01-28 RX ORDER — HYDROCHLOROTHIAZIDE 12.5 MG/1
12.5 TABLET ORAL DAILY
Qty: 100 TABLET | Refills: 1 | Status: SHIPPED | OUTPATIENT
Start: 2025-01-28

## 2025-01-28 RX ORDER — LOSARTAN POTASSIUM 50 MG/1
50 TABLET ORAL DAILY
Qty: 100 TABLET | Refills: 1 | Status: SHIPPED | OUTPATIENT
Start: 2025-01-28

## 2025-01-31 ENCOUNTER — APPOINTMENT (OUTPATIENT)
Dept: LAB | Facility: HOSPITAL | Age: 80
End: 2025-01-31
Attending: FAMILY MEDICINE
Payer: MEDICARE

## 2025-01-31 DIAGNOSIS — N18.31 TYPE 2 DIABETES MELLITUS WITH STAGE 3A CHRONIC KIDNEY DISEASE, WITHOUT LONG-TERM CURRENT USE OF INSULIN (HCC): ICD-10-CM

## 2025-01-31 DIAGNOSIS — E03.9 HYPOTHYROIDISM, UNSPECIFIED TYPE: ICD-10-CM

## 2025-01-31 DIAGNOSIS — E11.22 TYPE 2 DIABETES MELLITUS WITH STAGE 3A CHRONIC KIDNEY DISEASE, WITHOUT LONG-TERM CURRENT USE OF INSULIN (HCC): ICD-10-CM

## 2025-01-31 DIAGNOSIS — I25.119 ATHEROSCLEROSIS OF NATIVE CORONARY ARTERY WITH ANGINA PECTORIS, UNSPECIFIED WHETHER NATIVE OR TRANSPLANTED HEART (HCC): ICD-10-CM

## 2025-01-31 DIAGNOSIS — I10 ESSENTIAL HYPERTENSION, MALIGNANT: ICD-10-CM

## 2025-01-31 DIAGNOSIS — E78.5 HYPERLIPIDEMIA, UNSPECIFIED HYPERLIPIDEMIA TYPE: ICD-10-CM

## 2025-01-31 DIAGNOSIS — E78.1 PURE HYPERGLYCERIDEMIA: ICD-10-CM

## 2025-01-31 LAB
ALBUMIN SERPL BCG-MCNC: 4.7 G/DL (ref 3.5–5)
ALP SERPL-CCNC: 53 U/L (ref 34–104)
ALT SERPL W P-5'-P-CCNC: 17 U/L (ref 7–52)
ANION GAP SERPL CALCULATED.3IONS-SCNC: 7 MMOL/L (ref 4–13)
AST SERPL W P-5'-P-CCNC: 25 U/L (ref 13–39)
BASOPHILS # BLD AUTO: 0.08 THOUSANDS/ΜL (ref 0–0.1)
BASOPHILS NFR BLD AUTO: 1 % (ref 0–1)
BILIRUB SERPL-MCNC: 0.71 MG/DL (ref 0.2–1)
BUN SERPL-MCNC: 20 MG/DL (ref 5–25)
CALCIUM SERPL-MCNC: 9.6 MG/DL (ref 8.4–10.2)
CHLORIDE SERPL-SCNC: 103 MMOL/L (ref 96–108)
CHOLEST SERPL-MCNC: 170 MG/DL (ref ?–200)
CO2 SERPL-SCNC: 31 MMOL/L (ref 21–32)
CREAT SERPL-MCNC: 1.02 MG/DL (ref 0.6–1.3)
EOSINOPHIL # BLD AUTO: 0.47 THOUSAND/ΜL (ref 0–0.61)
EOSINOPHIL NFR BLD AUTO: 7 % (ref 0–6)
ERYTHROCYTE [DISTWIDTH] IN BLOOD BY AUTOMATED COUNT: 14.6 % (ref 11.6–15.1)
EST. AVERAGE GLUCOSE BLD GHB EST-MCNC: 131 MG/DL
GFR SERPL CREATININE-BSD FRML MDRD: 53 ML/MIN/1.73SQ M
GLUCOSE P FAST SERPL-MCNC: 109 MG/DL (ref 65–99)
HBA1C MFR BLD: 6.2 %
HCT VFR BLD AUTO: 39.3 % (ref 34.8–46.1)
HDLC SERPL-MCNC: 83 MG/DL
HGB BLD-MCNC: 13.2 G/DL (ref 11.5–15.4)
IMM GRANULOCYTES # BLD AUTO: 0.02 THOUSAND/UL (ref 0–0.2)
IMM GRANULOCYTES NFR BLD AUTO: 0 % (ref 0–2)
LDLC SERPL CALC-MCNC: 70 MG/DL (ref 0–100)
LYMPHOCYTES # BLD AUTO: 1.8 THOUSANDS/ΜL (ref 0.6–4.47)
LYMPHOCYTES NFR BLD AUTO: 26 % (ref 14–44)
MCH RBC QN AUTO: 32.6 PG (ref 26.8–34.3)
MCHC RBC AUTO-ENTMCNC: 33.6 G/DL (ref 31.4–37.4)
MCV RBC AUTO: 97 FL (ref 82–98)
MONOCYTES # BLD AUTO: 0.36 THOUSAND/ΜL (ref 0.17–1.22)
MONOCYTES NFR BLD AUTO: 5 % (ref 4–12)
NEUTROPHILS # BLD AUTO: 4.1 THOUSANDS/ΜL (ref 1.85–7.62)
NEUTS SEG NFR BLD AUTO: 61 % (ref 43–75)
NRBC BLD AUTO-RTO: 0 /100 WBCS
PLATELET # BLD AUTO: 299 THOUSANDS/UL (ref 149–390)
PMV BLD AUTO: 9 FL (ref 8.9–12.7)
POTASSIUM SERPL-SCNC: 4.5 MMOL/L (ref 3.5–5.3)
PROT SERPL-MCNC: 7.6 G/DL (ref 6.4–8.4)
RBC # BLD AUTO: 4.05 MILLION/UL (ref 3.81–5.12)
SODIUM SERPL-SCNC: 141 MMOL/L (ref 135–147)
TRIGL SERPL-MCNC: 84 MG/DL (ref ?–150)
TSH SERPL DL<=0.05 MIU/L-ACNC: 3.76 UIU/ML (ref 0.45–4.5)
WBC # BLD AUTO: 6.83 THOUSAND/UL (ref 4.31–10.16)

## 2025-01-31 PROCEDURE — 83036 HEMOGLOBIN GLYCOSYLATED A1C: CPT

## 2025-01-31 PROCEDURE — 80053 COMPREHEN METABOLIC PANEL: CPT

## 2025-01-31 PROCEDURE — 36415 COLL VENOUS BLD VENIPUNCTURE: CPT

## 2025-01-31 PROCEDURE — 85025 COMPLETE CBC W/AUTO DIFF WBC: CPT

## 2025-01-31 PROCEDURE — 84443 ASSAY THYROID STIM HORMONE: CPT

## 2025-01-31 PROCEDURE — 80061 LIPID PANEL: CPT

## 2025-02-04 DIAGNOSIS — E03.9 HYPOTHYROIDISM, UNSPECIFIED TYPE: ICD-10-CM

## 2025-02-04 RX ORDER — THYROID 90 MG/1
90 TABLET ORAL DAILY
Qty: 100 TABLET | Refills: 1 | Status: SHIPPED | OUTPATIENT
Start: 2025-02-04

## 2025-02-16 ENCOUNTER — RA CDI HCC (OUTPATIENT)
Dept: OTHER | Facility: HOSPITAL | Age: 80
End: 2025-02-16

## 2025-02-24 ENCOUNTER — OFFICE VISIT (OUTPATIENT)
Dept: FAMILY MEDICINE CLINIC | Facility: CLINIC | Age: 80
End: 2025-02-24
Payer: MEDICARE

## 2025-02-24 VITALS
HEART RATE: 76 BPM | OXYGEN SATURATION: 100 % | SYSTOLIC BLOOD PRESSURE: 102 MMHG | HEIGHT: 65 IN | BODY MASS INDEX: 24.49 KG/M2 | DIASTOLIC BLOOD PRESSURE: 72 MMHG | WEIGHT: 147 LBS

## 2025-02-24 DIAGNOSIS — C50.919 MALIGNANT NEOPLASM OF FEMALE BREAST, UNSPECIFIED ESTROGEN RECEPTOR STATUS, UNSPECIFIED LATERALITY, UNSPECIFIED SITE OF BREAST (HCC): ICD-10-CM

## 2025-02-24 DIAGNOSIS — I10 PRIMARY HYPERTENSION: ICD-10-CM

## 2025-02-24 DIAGNOSIS — D64.9 ANEMIA, UNSPECIFIED TYPE: ICD-10-CM

## 2025-02-24 DIAGNOSIS — R73.03 PREDIABETES: ICD-10-CM

## 2025-02-24 DIAGNOSIS — Z00.00 MEDICARE ANNUAL WELLNESS VISIT, SUBSEQUENT: ICD-10-CM

## 2025-02-24 DIAGNOSIS — M10.9 GOUT, UNSPECIFIED CAUSE, UNSPECIFIED CHRONICITY, UNSPECIFIED SITE: ICD-10-CM

## 2025-02-24 DIAGNOSIS — J45.909 UNCOMPLICATED ASTHMA, UNSPECIFIED ASTHMA SEVERITY, UNSPECIFIED WHETHER PERSISTENT: ICD-10-CM

## 2025-02-24 DIAGNOSIS — E78.5 HYPERLIPIDEMIA, UNSPECIFIED HYPERLIPIDEMIA TYPE: Primary | ICD-10-CM

## 2025-02-24 DIAGNOSIS — E03.9 HYPOTHYROIDISM, UNSPECIFIED TYPE: ICD-10-CM

## 2025-02-24 PROBLEM — E11.22 TYPE 2 DIABETES MELLITUS WITH STAGE 3A CHRONIC KIDNEY DISEASE, WITHOUT LONG-TERM CURRENT USE OF INSULIN (HCC): Status: RESOLVED | Noted: 2024-02-15 | Resolved: 2025-02-24

## 2025-02-24 PROBLEM — N18.31 TYPE 2 DIABETES MELLITUS WITH STAGE 3A CHRONIC KIDNEY DISEASE, WITHOUT LONG-TERM CURRENT USE OF INSULIN (HCC): Status: RESOLVED | Noted: 2024-02-15 | Resolved: 2025-02-24

## 2025-02-24 PROCEDURE — G0439 PPPS, SUBSEQ VISIT: HCPCS | Performed by: FAMILY MEDICINE

## 2025-02-24 PROCEDURE — G2211 COMPLEX E/M VISIT ADD ON: HCPCS | Performed by: FAMILY MEDICINE

## 2025-02-24 PROCEDURE — 99214 OFFICE O/P EST MOD 30 MIN: CPT | Performed by: FAMILY MEDICINE

## 2025-02-24 RX ORDER — SENNOSIDES 8.6 MG
650 CAPSULE ORAL EVERY 8 HOURS PRN
COMMUNITY

## 2025-02-24 NOTE — ASSESSMENT & PLAN NOTE
Orders:    Comprehensive metabolic panel; Future    CBC and differential; Future    Lipid Panel with Direct LDL reflex; Future

## 2025-02-24 NOTE — PROGRESS NOTES
Name: Hope Sue      : 1945      MRN: [id number]  Encounter Provider: Joe Gifford MD  Encounter Date: 2025   Encounter department: [facility] [address]    Assessment & Plan  Medicare annual wellness visit, subsequent         Primary hypertension  Continue hydrochlorothiazide 12.5 mg and losartan 50 mg daily       Uncomplicated asthma, unspecified asthma severity, unspecified whether persistent  Continue Trelegy and albuterol inhaler as needed       Hypothyroidism, unspecified type  Continue NP thyroid 90 mg daily  Orders:    TSH, 3rd generation with Free T4 reflex; Future    Anemia, unspecified type         Malignant neoplasm of female breast, unspecified estrogen receptor status, unspecified laterality, unspecified site of breast (HCC)         Hyperlipidemia, unspecified hyperlipidemia type    Orders:    Comprehensive metabolic panel; Future    CBC and differential; Future    Lipid Panel with Direct LDL reflex; Future    Gout, unspecified cause, unspecified chronicity, unspecified site         Prediabetes    Orders:    Albumin / creatinine urine ratio; Future       Preventive health issues were discussed with patient, and age appropriate screening tests were ordered as noted in patient's After Visit Summary. Personalized health advice and appropriate referrals for health education or preventive services given if needed, as noted in patient's After Visit Summary.    History of Present Illness     Patient comes in for checkup.       Patient Care Team:  Joe Gifford MD as PCP - General  Joe Gifford MD    Review of Systems   Constitutional: Negative.    Respiratory: Negative.     Cardiovascular: Negative.      Medical History Reviewed by provider this encounter:  Tobacco  Allergies  Meds  Problems  Med Hx  Surg Hx  Fam Hx       Annual Wellness Visit Questionnaire   Hope is here for her Subsequent Wellness visit. Last Medicare Wellness visit information  reviewed, patient interviewed and updates made to the record today.      Health Risk Assessment:   Patient rates overall health as very good. Patient feels that their physical health rating is same. Patient is satisfied with their life. Eyesight was rated as same. Hearing was rated as same. Patient feels that their emotional and mental health rating is same. Patients states they are never, rarely angry. Patient states they are never, rarely unusually tired/fatigued. Pain experienced in the last 7 days has been none. Patient states that she has experienced no weight loss or gain in last 6 months.     Depression Screening:   PHQ-2 Score: 0      Fall Risk Screening:   In the past year, patient has experienced: no history of falling in past year      Urinary Incontinence Screening:   Patient has not leaked urine accidently in the last six months.     Home Safety:  Patient does not have trouble with stairs inside or outside of their home. Patient has working smoke alarms and has working carbon monoxide detector. Home safety hazards include: none.     Nutrition:   Current diet is Regular.     Medications:   Patient is not currently taking any over-the-counter supplements. Patient is able to manage medications.     Activities of Daily Living (ADLs)/Instrumental Activities of Daily Living (IADLs):   Walk and transfer into and out of bed and chair?: Yes  Dress and groom yourself?: Yes    Bathe or shower yourself?: Yes    Feed yourself? Yes  Do your laundry/housekeeping?: Yes  Manage your money, pay your bills and track your expenses?: Yes  Make your own meals?: Yes    Do your own shopping?: Yes    Previous Hospitalizations:   Any hospitalizations or ED visits within the last 12 months?: Yes    How many hospitalizations have you had in the last year?: 1-2    Advance Care Planning:   Living will: Yes    Durable POA for healthcare: Yes    Advanced directive: Yes    Advanced directive counseling given: Yes    Five wishes given:  No    End of Life Decisions reviewed with patient: Yes    Provider agrees with end of life decisions: Yes      PREVENTIVE SCREENINGS      Cardiovascular Screening:    General: Screening Not Indicated, History Lipid Disorder and Screening Current      Diabetes Screening:     General: Screening Not Indicated, History Diabetes and Screening Current      Colorectal Cancer Screening:     General: Risks and Benefits Discussed    Due for: Colonoscopy - Low Risk      Breast Cancer Screening:     General: Screening Not Indicated and History Breast Cancer      Cervical Cancer Screening:    General: Screening Not Indicated      Osteoporosis Screening:    General: Risks and Benefits Discussed    Due for: DXA Axial      Abdominal Aortic Aneurysm (AAA) Screening:        General: Screening Not Indicated      Lung Cancer Screening:     General: Screening Not Indicated      Hepatitis C Screening:    General: Screening Current    Screening, Brief Intervention, and Referral to Treatment (SBIRT)     Screening  Typical number of drinks in a day: 0  Typical number of drinks in a week: 0  Interpretation: Low risk drinking behavior.    Single Item Drug Screening:  How often have you used an illegal drug (including marijuana) or a prescription medication for non-medical reasons in the past year? never    Single Item Drug Screen Score: 0  Interpretation: Negative screen for possible drug use disorder    Review of Current Opioid Use    Opioid Risk Tool (ORT) Interpretation: Complete Opioid Risk Tool (ORT)    Social Drivers of Health     Financial Resource Strain: Low Risk  (2/13/2024)    Overall Financial Resource Strain (CARDIA)     Difficulty of Paying Living Expenses: Not hard at all   Food Insecurity: No Food Insecurity (2/24/2025)    Hunger Vital Sign     Worried About Running Out of Food in the Last Year: Never true     Ran Out of Food in the Last Year: Never true   Transportation Needs: No Transportation Needs (2/24/2025)    PRAPARE -  "Transportation     Lack of Transportation (Medical): No     Lack of Transportation (Non-Medical): No   Housing Stability: Low Risk  (2/24/2025)    Housing Stability Vital Sign     Unable to Pay for Housing in the Last Year: No     Number of Times Moved in the Last Year: 0     Homeless in the Last Year: No   Utilities: Not At Risk (2/24/2025)    [facility] Utilities     Threatened with loss of utilities: No     No results found.    Objective   /72   Pulse 76   Ht 5' 4.5\" (1.638 m)   Wt 66.7 kg (147 lb)   SpO2 100%   BMI 24.84 kg/m²     Physical Exam  Constitutional:       Appearance: Normal appearance. She is well-developed.   HENT:      Head: Normocephalic and atraumatic.      Right Ear: External ear normal.      Left Ear: External ear normal.   Eyes:      Pupils: Pupils are equal, round, and reactive to light.   Neck:      Thyroid: No thyromegaly.   Cardiovascular:      Rate and Rhythm: Normal rate and regular rhythm.      Heart sounds: Normal heart sounds.   Pulmonary:      Effort: Pulmonary effort is normal.      Breath sounds: Normal breath sounds.   Musculoskeletal:         General: Normal range of motion.      Cervical back: Neck supple.   Lymphadenopathy:      Cervical: No cervical adenopathy.   Skin:     General: Skin is warm and dry.   Neurological:      Mental Status: She is alert.   Psychiatric:         Mood and Affect: Mood normal.         Behavior: Behavior normal.         "

## 2025-02-24 NOTE — PATIENT INSTRUCTIONS
Medicare Preventive Visit Patient Instructions  Thank you for completing your Welcome to Medicare Visit or Medicare Annual Wellness Visit today. Your next wellness visit will be due in one year (2/25/2026).  The screening/preventive services that you may require over the next 5-10 years are detailed below. Some tests may not apply to you based off risk factors and/or age. Screening tests ordered at today's visit but not completed yet may show as past due. Also, please note that scanned in results may not display below.  Preventive Screenings:  Service Recommendations Previous Testing/Comments   Colorectal Cancer Screening  * Colonoscopy    * Fecal Occult Blood Test (FOBT)/Fecal Immunochemical Test (FIT)  * Fecal DNA/Cologuard Test  * Flexible Sigmoidoscopy Age: 45-75 years old   Colonoscopy: every 10 years (may be performed more frequently if at higher risk)  OR  FOBT/FIT: every 1 year  OR  Cologuard: every 3 years  OR  Sigmoidoscopy: every 5 years  Screening may be recommended earlier than age 45 if at higher risk for colorectal cancer. Also, an individualized decision between you and your healthcare provider will decide whether screening between the ages of 76-85 would be appropriate. Colonoscopy: 11/13/2019  FOBT/FIT: Not on file  Cologuard: Not on file  Sigmoidoscopy: Not on file          Breast Cancer Screening Age: 40+ years old  Frequency: every 1-2 years  Not required if history of left and right mastectomy Mammogram: 09/24/2018    Screening Not Indicated  History Breast Cancer   Cervical Cancer Screening Between the ages of 21-29, pap smear recommended once every 3 years.   Between the ages of 30-65, can perform pap smear with HPV co-testing every 5 years.   Recommendations may differ for women with a history of total hysterectomy, cervical cancer, or abnormal pap smears in past. Pap Smear: Not on file    Screening Not Indicated   Hepatitis C Screening Once for adults born between 1945 and 1965  More  frequently in patients at high risk for Hepatitis C Hep C Antibody: Not on file    Screening Current   Diabetes Screening 1-2 times per year if you're at risk for diabetes or have pre-diabetes Fasting glucose: 109 mg/dL (1/31/2025)  A1C: 6.2 % (1/31/2025)  Screening Not Indicated  History Diabetes   Cholesterol Screening Once every 5 years if you don't have a lipid disorder. May order more often based on risk factors. Lipid panel: 01/31/2025    Screening Not Indicated  History Lipid Disorder     Other Preventive Screenings Covered by Medicare:  Abdominal Aortic Aneurysm (AAA) Screening: covered once if your at risk. You're considered to be at risk if you have a family history of AAA.  Lung Cancer Screening: covers low dose CT scan once per year if you meet all of the following conditions: (1) Age 55-77; (2) No signs or symptoms of lung cancer; (3) Current smoker or have quit smoking within the last 15 years; (4) You have a tobacco smoking history of at least 20 pack years (packs per day multiplied by number of years you smoked); (5) You get a written order from a healthcare provider.  Glaucoma Screening: covered annually if you're considered high risk: (1) You have diabetes OR (2) Family history of glaucoma OR (3)  aged 50 and older OR (4)  American aged 65 and older  Osteoporosis Screening: covered every 2 years if you meet one of the following conditions: (1) You're estrogen deficient and at risk for osteoporosis based off medical history and other findings; (2) Have a vertebral abnormality; (3) On glucocorticoid therapy for more than 3 months; (4) Have primary hyperparathyroidism; (5) On osteoporosis medications and need to assess response to drug therapy.   Last bone density test (DXA Scan): 08/14/2014.  HIV Screening: covered annually if you're between the age of 15-65. Also covered annually if you are younger than 15 and older than 65 with risk factors for HIV infection. For pregnant  patients, it is covered up to 3 times per pregnancy.    Immunizations:  Immunization Recommendations   Influenza Vaccine Annual influenza vaccination during flu season is recommended for all persons aged >= 6 months who do not have contraindications   Pneumococcal Vaccine   * Pneumococcal conjugate vaccine = PCV13 (Prevnar 13), PCV15 (Vaxneuvance), PCV20 (Prevnar 20)  * Pneumococcal polysaccharide vaccine = PPSV23 (Pneumovax) Adults 19-63 yo with certain risk factors or if 65+ yo  If never received any pneumonia vaccine: recommend Prevnar 20 (PCV20)  Give PCV20 if previously received 1 dose of PCV13 or PPSV23   Hepatitis B Vaccine 3 dose series if at intermediate or high risk (ex: diabetes, end stage renal disease, liver disease)   Respiratory syncytial virus (RSV) Vaccine - COVERED BY MEDICARE PART D  * RSVPreF3 (Arexvy) CDC recommends that adults 60 years of age and older may receive a single dose of RSV vaccine using shared clinical decision-making (SCDM)   Tetanus (Td) Vaccine - COST NOT COVERED BY MEDICARE PART B Following completion of primary series, a booster dose should be given every 10 years to maintain immunity against tetanus. Td may also be given as tetanus wound prophylaxis.   Tdap Vaccine - COST NOT COVERED BY MEDICARE PART B Recommended at least once for all adults. For pregnant patients, recommended with each pregnancy.   Shingles Vaccine (Shingrix) - COST NOT COVERED BY MEDICARE PART B  2 shot series recommended in those 19 years and older who have or will have weakened immune systems or those 50 years and older     Health Maintenance Due:      Topic Date Due   • Colorectal Cancer Screening  11/13/2024   • Hepatitis C Screening  Addressed     Immunizations Due:      Topic Date Due   • Influenza Vaccine (1) Never done   • COVID-19 Vaccine (3 - 2024-25 season) 09/01/2024     Advance Directives   What are advance directives?  Advance directives are legal documents that state your wishes and plans for  medical care. These plans are made ahead of time in case you lose your ability to make decisions for yourself. Advance directives can apply to any medical decision, such as the treatments you want, and if you want to donate organs.   What are the types of advance directives?  There are many types of advance directives, and each state has rules about how to use them. You may choose a combination of any of the following:  Living will:  This is a written record of the treatment you want. You can also choose which treatments you do not want, which to limit, and which to stop at a certain time. This includes surgery, medicine, IV fluid, and tube feedings.   Durable power of  for healthcare (DPAHC):  This is a written record that states who you want to make healthcare choices for you when you are unable to make them for yourself. This person, called a proxy, is usually a family member or a friend. You may choose more than 1 proxy.  Do not resuscitate (DNR) order:  A DNR order is used in case your heart stops beating or you stop breathing. It is a request not to have certain forms of treatment, such as CPR. A DNR order may be included in other types of advance directives.  Medical directive:  This covers the care that you want if you are in a coma, near death, or unable to make decisions for yourself. You can list the treatments you want for each condition. Treatment may include pain medicine, surgery, blood transfusions, dialysis, IV or tube feedings, and a ventilator (breathing machine).  Values history:  This document has questions about your views, beliefs, and how you feel and think about life. This information can help others choose the care that you would choose.  Why are advance directives important?  An advance directive helps you control your care. Although spoken wishes may be used, it is better to have your wishes written down. Spoken wishes can be misunderstood, or not followed. Treatments may be given  even if you do not want them. An advance directive may make it easier for your family to make difficult choices about your care.   Narcotic (Opioid) Safety    Use narcotics safely:  Take prescribed narcotics exactly as directed  Do not give narcotics to others or take narcotics that belong to someone else  Do not mix narcotics without medicines or alcohol  Do not drive or operate heavy machinery after you take the narcotic  Monitor for side effects and notify your healthcare provider if you experienced side effects such as nausea, sleepiness, itching, or trouble thinking clearly.    Manage constipation:    Constipation is the most common side effect of narcotic medicine. Constipation is when you have hard, dry bowel movements, or you go longer than usual between bowel movements. Tell your healthcare provider about all changes in your bowel movements while you are taking narcotics. He or she may recommend laxative medicine to help you have a bowel movement. He or she may also change the kind of narcotic you are taking, or change when you take it. The following are more ways you can prevent or relieve constipation:    Drink liquids as directed.  You may need to drink extra liquids to help soften and move your bowels. Ask how much liquid to drink each day and which liquids are best for you.  Eat high-fiber foods.  This may help decrease constipation by adding bulk to your bowel movements. High-fiber foods include fruits, vegetables, whole-grain breads and cereals, and beans. Your healthcare provider or dietitian can help you create a high-fiber meal plan. Your provider may also recommend a fiber supplement if you cannot get enough fiber from food.  Exercise regularly.  Regular physical activity can help stimulate your intestines. Walking is a good exercise to prevent or relieve constipation. Ask which exercises are best for you.  Schedule a time each day to have a bowel movement.  This may help train your body to have  regular bowel movements. Bend forward while you are on the toilet to help move the bowel movement out. Sit on the toilet for at least 10 minutes, even if you do not have a bowel movement.    Store narcotics safely:   Store narcotics where others cannot easily get them.  Keep them in a locked cabinet or secure area. Do not  keep them in a purse or other bag you carry with you. A person may be looking for something else and find the narcotics.  Make sure narcotics are stored out of the reach of children.  A child can easily overdose on narcotics. Narcotics may look like candy to a small child.    The best way to dispose of narcotics:      The laws vary by country and area. In the [place], the best way is to return the narcotics through a take-back program. This program is offered by the [place] Drug Enforcement Agency (JAV[employer]). The following are options for using the program:  Take the narcotics to a [employer] collection site.  The site is often a law enforcement center. Call your local law enforcement center for scheduled take-back days in your area. You will be given information on where to go if the collection site is in a different location.  Take the narcotics to an approved pharmacy or hospital.  A pharmacy or hospital may be set up as a collection site. You will need to ask if it is a [employer] collection site if you were not directed there. A pharmacy or doctor's office may not be able to take back narcotics unless it is a [employer] site.  Use a mail-back system.  This means you are given containers to put the narcotics into. You will then mail them in the containers.  Use a take-back drop box.  This is a place to leave the narcotics at any time. People and animals will not be able to get into the box. Your local law enforcement agency can tell you where to find a drop box in your area.    Other ways to manage pain:   Ask your healthcare provider about non-narcotic medicines to control pain.  Nonprescription medicines include  NSAIDs (such as ibuprofen) and acetaminophen. Prescription medicines include muscle relaxers, antidepressants, and steroids.  Pain may be managed without any medicines.  Some ways to relieve pain include massage, aromatherapy, or meditation. Physical or occupational therapy may also help.    For more information:   Drug Enforcement Administration  [address] [place] [address]  Phone: 4- 932 - 453-8993  Web Address: https://www.deadiversion.[email or web address].gov/drug_disposal/     Food and Drug Administration  [address] [place] [address]  Phone: 4- 771 - 933-7832  Web Address: http://www.fda.gov     © Copyright [employer] 2018 Information is for End User's use only and may not be sold, redistributed or otherwise used for commercial purposes. All illustrations and images included in CareNotes® are the copyrighted property of [employer] or [employer]

## 2025-08-05 ENCOUNTER — APPOINTMENT (OUTPATIENT)
Dept: LAB | Facility: HOSPITAL | Age: 80
End: 2025-08-05
Payer: MEDICARE

## 2025-08-05 DIAGNOSIS — I10 ESSENTIAL HYPERTENSION, MALIGNANT: ICD-10-CM

## 2025-08-05 DIAGNOSIS — I25.10 ATHEROSCLEROSIS OF NATIVE CORONARY ARTERY WITHOUT ANGINA PECTORIS, UNSPECIFIED WHETHER NATIVE OR TRANSPLANTED HEART: ICD-10-CM

## 2025-08-05 DIAGNOSIS — E03.9 HYPOTHYROIDISM, UNSPECIFIED TYPE: ICD-10-CM

## 2025-08-05 DIAGNOSIS — E78.5 HYPERLIPIDEMIA, UNSPECIFIED HYPERLIPIDEMIA TYPE: ICD-10-CM

## 2025-08-05 DIAGNOSIS — R73.03 PREDIABETES: ICD-10-CM

## 2025-08-05 DIAGNOSIS — E78.1 PURE HYPERGLYCERIDEMIA: ICD-10-CM

## 2025-08-05 LAB
ALBUMIN SERPL BCG-MCNC: 4.7 G/DL (ref 3.5–5)
ALP SERPL-CCNC: 51 U/L (ref 34–104)
ALT SERPL W P-5'-P-CCNC: 17 U/L (ref 7–52)
ANION GAP SERPL CALCULATED.3IONS-SCNC: 6 MMOL/L (ref 4–13)
AST SERPL W P-5'-P-CCNC: 25 U/L (ref 13–39)
BASOPHILS # BLD AUTO: 0.06 THOUSANDS/ÂΜL (ref 0–0.1)
BASOPHILS NFR BLD AUTO: 1 % (ref 0–1)
BILIRUB SERPL-MCNC: 0.56 MG/DL (ref 0.2–1)
BUN SERPL-MCNC: 22 MG/DL (ref 5–25)
CALCIUM SERPL-MCNC: 9.7 MG/DL (ref 8.4–10.2)
CHLORIDE SERPL-SCNC: 103 MMOL/L (ref 96–108)
CHOLEST SERPL-MCNC: 164 MG/DL (ref ?–200)
CO2 SERPL-SCNC: 29 MMOL/L (ref 21–32)
CREAT SERPL-MCNC: 0.89 MG/DL (ref 0.6–1.3)
CREAT UR-MCNC: 111.6 MG/DL
EOSINOPHIL # BLD AUTO: 0.58 THOUSAND/ÂΜL (ref 0–0.61)
EOSINOPHIL NFR BLD AUTO: 10 % (ref 0–6)
ERYTHROCYTE [DISTWIDTH] IN BLOOD BY AUTOMATED COUNT: 14.1 % (ref 11.6–15.1)
GFR SERPL CREATININE-BSD FRML MDRD: 62 ML/MIN/1.73SQ M
GLUCOSE P FAST SERPL-MCNC: 102 MG/DL (ref 65–99)
HCT VFR BLD AUTO: 38.1 % (ref 34.8–46.1)
HDLC SERPL-MCNC: 81 MG/DL
HGB BLD-MCNC: 13 G/DL (ref 11.5–15.4)
IMM GRANULOCYTES # BLD AUTO: 0.02 THOUSAND/UL (ref 0–0.2)
IMM GRANULOCYTES NFR BLD AUTO: 0 % (ref 0–2)
LDLC SERPL CALC-MCNC: 63 MG/DL (ref 0–100)
LYMPHOCYTES # BLD AUTO: 1.65 THOUSANDS/ÂΜL (ref 0.6–4.47)
LYMPHOCYTES NFR BLD AUTO: 29 % (ref 14–44)
MCH RBC QN AUTO: 33.1 PG (ref 26.8–34.3)
MCHC RBC AUTO-ENTMCNC: 34.1 G/DL (ref 31.4–37.4)
MCV RBC AUTO: 97 FL (ref 82–98)
MICROALBUMIN UR-MCNC: 9 MG/L
MICROALBUMIN/CREAT 24H UR: 8 MG/G CREATININE (ref 0–30)
MONOCYTES # BLD AUTO: 0.42 THOUSAND/ÂΜL (ref 0.17–1.22)
MONOCYTES NFR BLD AUTO: 7 % (ref 4–12)
NEUTROPHILS # BLD AUTO: 3.02 THOUSANDS/ÂΜL (ref 1.85–7.62)
NEUTS SEG NFR BLD AUTO: 53 % (ref 43–75)
NRBC BLD AUTO-RTO: 0 /100 WBCS
PLATELET # BLD AUTO: 265 THOUSANDS/UL (ref 149–390)
PMV BLD AUTO: 9.4 FL (ref 8.9–12.7)
POTASSIUM SERPL-SCNC: 4.4 MMOL/L (ref 3.5–5.3)
PROT SERPL-MCNC: 7.2 G/DL (ref 6.4–8.4)
RBC # BLD AUTO: 3.93 MILLION/UL (ref 3.81–5.12)
SODIUM SERPL-SCNC: 138 MMOL/L (ref 135–147)
TRIGL SERPL-MCNC: 101 MG/DL (ref ?–150)
TSH SERPL DL<=0.05 MIU/L-ACNC: 1.11 UIU/ML (ref 0.45–4.5)
WBC # BLD AUTO: 5.75 THOUSAND/UL (ref 4.31–10.16)

## 2025-08-05 PROCEDURE — 36415 COLL VENOUS BLD VENIPUNCTURE: CPT

## 2025-08-05 PROCEDURE — 84443 ASSAY THYROID STIM HORMONE: CPT

## 2025-08-05 PROCEDURE — 85025 COMPLETE CBC W/AUTO DIFF WBC: CPT

## 2025-08-05 PROCEDURE — 80053 COMPREHEN METABOLIC PANEL: CPT

## 2025-08-05 PROCEDURE — 80061 LIPID PANEL: CPT

## 2025-08-05 PROCEDURE — 82570 ASSAY OF URINE CREATININE: CPT

## 2025-08-05 PROCEDURE — 82043 UR ALBUMIN QUANTITATIVE: CPT

## (undated) DEVICE — GLOVE INDICATOR PI UNDERGLOVE SZ 8 BLUE

## (undated) DEVICE — ADHESIVE SKIN CLOSR DERMABOND PRINEO

## (undated) DEVICE — SUT ETHIBOND 5 V-37 30 IN MB66G

## (undated) DEVICE — SUT FIBERWIRE #2 1/2 CIRCLE T-5 38IN AR-7200

## (undated) DEVICE — BLADE SAGITTAL 63.0 X 19.5MM

## (undated) DEVICE — 3000CC GUARDIAN II: Brand: GUARDIAN

## (undated) DEVICE — BETHLEHEM TOTAL HIP, KIT: Brand: CARDINAL HEALTH

## (undated) DEVICE — PIN FIX REVERSE 2.5MM SHOULDER TRAB METAL STRL

## (undated) DEVICE — PENCIL ELECTROSURG E-Z CLEAN -0035H

## (undated) DEVICE — SCD SEQUENTIAL COMPRESSION COMFORT SLEEVE MEDIUM KNEE LENGTH: Brand: KENDALL SCD

## (undated) DEVICE — INTENDED FOR TISSUE SEPARATION, AND OTHER PROCEDURES THAT REQUIRE A SHARP SURGICAL BLADE TO PUNCTURE OR CUT.: Brand: BARD-PARKER ® CARBON RIB-BACK BLADES

## (undated) DEVICE — DRILL BIT 2.5MM REVERSE SHOULDER

## (undated) DEVICE — COBAN 6 IN STERILE

## (undated) DEVICE — SUT VICRYL 2-0 CT-1 36 IN J945H

## (undated) DEVICE — PREP SURGICAL PURPREP 26ML

## (undated) DEVICE — GLOVE SRG BIOGEL 8

## (undated) DEVICE — DRESSING MEPILEX AG BORDER POST-OP 4 X 8 IN

## (undated) DEVICE — SUT MONOCRYL 3-0 PS-2 27 IN Y427H

## (undated) DEVICE — IMPERVIOUS STOCKINETTE: Brand: DEROYAL

## (undated) DEVICE — THE SIMPULSE SOLO SYSTEM WITH ULTREX RETRACTABLE SPLASH SHIELD TIP: Brand: SIMPULSE SOLO

## (undated) DEVICE — SMARTGOWN SURGICAL GOWN, 3XL, LONG: Brand: CONVERTORS